# Patient Record
Sex: FEMALE | Race: BLACK OR AFRICAN AMERICAN | NOT HISPANIC OR LATINO | Employment: FULL TIME | URBAN - METROPOLITAN AREA
[De-identification: names, ages, dates, MRNs, and addresses within clinical notes are randomized per-mention and may not be internally consistent; named-entity substitution may affect disease eponyms.]

---

## 2024-02-05 ENCOUNTER — INITIAL PRENATAL (OUTPATIENT)
Dept: OBSTETRICS AND GYNECOLOGY | Facility: CLINIC | Age: 34
End: 2024-02-05

## 2024-02-05 ENCOUNTER — LAB (OUTPATIENT)
Dept: LAB | Facility: LAB | Age: 34
End: 2024-02-05

## 2024-02-05 VITALS
DIASTOLIC BLOOD PRESSURE: 60 MMHG | WEIGHT: 170 LBS | SYSTOLIC BLOOD PRESSURE: 112 MMHG | HEIGHT: 61 IN | BODY MASS INDEX: 32.1 KG/M2

## 2024-02-05 DIAGNOSIS — O09.11 PREGNANCY IN FIRST TRIMESTER WITH HISTORY OF ECTOPIC PREGNANCY (HHS-HCC): ICD-10-CM

## 2024-02-05 DIAGNOSIS — N92.6 MISSED MENSES: ICD-10-CM

## 2024-02-05 LAB
B-HCG SERPL-ACNC: ABNORMAL MIU/ML
PREGNANCY TEST URINE, POC: POSITIVE

## 2024-02-05 PROCEDURE — 84702 CHORIONIC GONADOTROPIN TEST: CPT

## 2024-02-05 PROCEDURE — 36415 COLL VENOUS BLD VENIPUNCTURE: CPT

## 2024-02-05 PROCEDURE — 99213 OFFICE O/P EST LOW 20 MIN: CPT | Performed by: OBSTETRICS & GYNECOLOGY

## 2024-02-05 PROCEDURE — 81025 URINE PREGNANCY TEST: CPT | Performed by: OBSTETRICS & GYNECOLOGY

## 2024-02-05 ASSESSMENT — EDINBURGH POSTNATAL DEPRESSION SCALE (EPDS)
I HAVE FELT SAD OR MISERABLE: NO, NOT AT ALL
I HAVE BEEN ANXIOUS OR WORRIED FOR NO GOOD REASON: HARDLY EVER
I HAVE FELT SCARED OR PANICKY FOR NO GOOD REASON: NO, NOT AT ALL
I HAVE LOOKED FORWARD WITH ENJOYMENT TO THINGS: AS MUCH AS I EVER DID
I HAVE BEEN SO UNHAPPY THAT I HAVE HAD DIFFICULTY SLEEPING: NOT AT ALL
I HAVE BLAMED MYSELF UNNECESSARILY WHEN THINGS WENT WRONG: NO, NEVER
I HAVE BEEN SO UNHAPPY THAT I HAVE BEEN CRYING: NO, NEVER
TOTAL SCORE: 1
THE THOUGHT OF HARMING MYSELF HAS OCCURRED TO ME: NEVER
I HAVE BEEN ABLE TO LAUGH AND SEE THE FUNNY SIDE OF THINGS: AS MUCH AS I ALWAYS COULD
THINGS HAVE BEEN GETTING ON TOP OF ME: NO, I HAVE BEEN COPING AS WELL AS EVER

## 2024-02-05 NOTE — PROGRESS NOTES
"NEW OB VISIT    Assessment/Plan   1. Missed menses  - pregnancy test positive today. History of ectopic pregnancy x 2 on left side and had a left salpingectomy in . Office bedside ultrasound today shows a probable intrauterine gestational sac though images not clear. Recommend HCG today and again in 72 hours. As long as rising appropriately then will plan for ultrasound next week. Discussed precautions for ectopic pregnancy and when to go to ER to seek care.  - Rx sent for prenatal vitamin  - POCT pregnancy, urine manually resulted  - Human Chorionic Gonadotropin, Serum Quantitative; Standing  - prenatal vitamin, iron-folic, 27 mg iron-800 mcg folic acid tablet; Take 1 tablet by mouth once daily.  Dispense: 90 tablet; Refill: 3  - US MAC OB imaging order; Future       Subjective     Amairani Duran is a 33 y.o.  at 6w2d by LMP alone who presents for confirmation of pregnancy in the setting of 2 recent ectopic pregnancies    She reports small amount of spotting, no active bleeding.   She reports mild aching in her left lower abdomen but no moderate or severe discomfort.     OB Hx:   : left laparoscopic salpingectomy for ectopic pregnancy  : left sided ectopic pregnancy which resolved without treatment (per patient report)  2016: SAB at 6 weeks, no treatment  2014: , term, son  : , term, son  : PLTCS at 35 weeks at  (cannot find delivery note). Reports presenting with bleeding, induced, and then had a  section for fetal heart rate concerns     Past Medical Hx: pre-diabetes  Past Surgical Hx: lap salpingectomy, c/s  Social Hx: Denies tobacco, alcohol, drugs  Family Hx: denies  Medications: None  Allergies: NKDA    Pap Hx: 2023: NILM/neg HPV  Flu vaccine: Discuss at [facility]  Covid vaccine: Discuss at [facility]      Objective     Objective   /60   Ht 1.549 m (5' 1\")   Wt 77.1 kg (170 lb)   LMP 2023   BMI 32.12 kg/m²      General:   Alert and oriented, in no acute " distress           Abdomen: Soft, non-tender, without masses or organomegaly   Vulva: Normal architecture without erythema, masses, or lesions.    Vagina: Normal mucosa without lesions, masses, or atrophy. Small amount of blood tinged discharge   Cervix: Normal without masses, lesions, or signs of cervicitis.    Uterus: Normal mobile, appropriate for gestational age   Adnexa: Normal without masses or lesions   Pelvic Floor No POP noted.    Psych Normal affect. Normal mood.

## 2024-02-08 ENCOUNTER — LAB (OUTPATIENT)
Dept: LAB | Facility: LAB | Age: 34
End: 2024-02-08
Payer: COMMERCIAL

## 2024-02-08 DIAGNOSIS — N92.6 MISSED MENSES: ICD-10-CM

## 2024-02-08 LAB — B-HCG SERPL-ACNC: ABNORMAL MIU/ML

## 2024-02-08 PROCEDURE — 36415 COLL VENOUS BLD VENIPUNCTURE: CPT

## 2024-02-08 PROCEDURE — 84702 CHORIONIC GONADOTROPIN TEST: CPT

## 2024-02-12 ENCOUNTER — HOSPITAL ENCOUNTER (OUTPATIENT)
Dept: RADIOLOGY | Facility: HOSPITAL | Age: 34
Discharge: HOME | End: 2024-02-12
Payer: COMMERCIAL

## 2024-02-12 DIAGNOSIS — O09.11 PREGNANCY IN FIRST TRIMESTER WITH HISTORY OF ECTOPIC PREGNANCY (HHS-HCC): ICD-10-CM

## 2024-02-12 PROCEDURE — 76817 TRANSVAGINAL US OBSTETRIC: CPT

## 2024-02-12 PROCEDURE — 76801 OB US < 14 WKS SINGLE FETUS: CPT

## 2024-02-12 PROCEDURE — 76801 OB US < 14 WKS SINGLE FETUS: CPT | Performed by: STUDENT IN AN ORGANIZED HEALTH CARE EDUCATION/TRAINING PROGRAM

## 2024-02-12 PROCEDURE — 76817 TRANSVAGINAL US OBSTETRIC: CPT | Performed by: STUDENT IN AN ORGANIZED HEALTH CARE EDUCATION/TRAINING PROGRAM

## 2024-03-15 ENCOUNTER — ROUTINE PRENATAL (OUTPATIENT)
Dept: OBSTETRICS AND GYNECOLOGY | Facility: CLINIC | Age: 34
End: 2024-03-15

## 2024-03-15 ENCOUNTER — LAB (OUTPATIENT)
Dept: LAB | Facility: LAB | Age: 34
End: 2024-03-15

## 2024-03-15 VITALS
SYSTOLIC BLOOD PRESSURE: 135 MMHG | DIASTOLIC BLOOD PRESSURE: 82 MMHG | BODY MASS INDEX: 33.63 KG/M2 | WEIGHT: 178 LBS | HEART RATE: 80 BPM

## 2024-03-15 DIAGNOSIS — N92.6 MISSED MENSES: ICD-10-CM

## 2024-03-15 DIAGNOSIS — Z3A.11 11 WEEKS GESTATION OF PREGNANCY (HHS-HCC): ICD-10-CM

## 2024-03-15 DIAGNOSIS — Z3A.11 11 WEEKS GESTATION OF PREGNANCY (HHS-HCC): Primary | ICD-10-CM

## 2024-03-15 PROBLEM — O09.11 PREGNANCY IN FIRST TRIMESTER WITH HISTORY OF ECTOPIC PREGNANCY (HHS-HCC): Status: RESOLVED | Noted: 2023-02-03 | Resolved: 2024-03-15

## 2024-03-15 LAB
ABO GROUP (TYPE) IN BLOOD: NORMAL
ANTIBODY SCREEN: NORMAL
B-HCG SERPL-ACNC: ABNORMAL MIU/ML
ERYTHROCYTE [DISTWIDTH] IN BLOOD BY AUTOMATED COUNT: 15.4 % (ref 11.5–14.5)
EST. AVERAGE GLUCOSE BLD GHB EST-MCNC: 105 MG/DL
HBA1C MFR BLD: 5.3 %
HBV SURFACE AG SERPL QL IA: NONREACTIVE
HCT VFR BLD AUTO: 38.9 % (ref 36–46)
HCV AB SER QL: NONREACTIVE
HGB BLD-MCNC: 12.2 G/DL (ref 12–16)
HIV 1+2 AB+HIV1 P24 AG SERPL QL IA: NONREACTIVE
MCH RBC QN AUTO: 25.1 PG (ref 26–34)
MCHC RBC AUTO-ENTMCNC: 31.4 G/DL (ref 32–36)
MCV RBC AUTO: 80 FL (ref 80–100)
NRBC BLD-RTO: 0 /100 WBCS (ref 0–0)
PLATELET # BLD AUTO: 287 X10*3/UL (ref 150–450)
RBC # BLD AUTO: 4.86 X10*6/UL (ref 4–5.2)
REFLEX ADDED, ANEMIA PANEL: NORMAL
RH FACTOR (ANTIGEN D): NORMAL
WBC # BLD AUTO: 7.4 X10*3/UL (ref 4.4–11.3)

## 2024-03-15 PROCEDURE — 83020 HEMOGLOBIN ELECTROPHORESIS: CPT | Performed by: OBSTETRICS & GYNECOLOGY

## 2024-03-15 PROCEDURE — 86901 BLOOD TYPING SEROLOGIC RH(D): CPT

## 2024-03-15 PROCEDURE — 83036 HEMOGLOBIN GLYCOSYLATED A1C: CPT

## 2024-03-15 PROCEDURE — 86780 TREPONEMA PALLIDUM: CPT

## 2024-03-15 PROCEDURE — 85027 COMPLETE CBC AUTOMATED: CPT

## 2024-03-15 PROCEDURE — 86803 HEPATITIS C AB TEST: CPT

## 2024-03-15 PROCEDURE — 84702 CHORIONIC GONADOTROPIN TEST: CPT

## 2024-03-15 PROCEDURE — 99213 OFFICE O/P EST LOW 20 MIN: CPT | Mod: GC,TH | Performed by: STUDENT IN AN ORGANIZED HEALTH CARE EDUCATION/TRAINING PROGRAM

## 2024-03-15 PROCEDURE — 86317 IMMUNOASSAY INFECTIOUS AGENT: CPT

## 2024-03-15 PROCEDURE — 0501F PRENATAL FLOW SHEET: CPT | Performed by: STUDENT IN AN ORGANIZED HEALTH CARE EDUCATION/TRAINING PROGRAM

## 2024-03-15 PROCEDURE — 87086 URINE CULTURE/COLONY COUNT: CPT | Performed by: STUDENT IN AN ORGANIZED HEALTH CARE EDUCATION/TRAINING PROGRAM

## 2024-03-15 PROCEDURE — 87340 HEPATITIS B SURFACE AG IA: CPT

## 2024-03-15 PROCEDURE — 36415 COLL VENOUS BLD VENIPUNCTURE: CPT

## 2024-03-15 PROCEDURE — 86850 RBC ANTIBODY SCREEN: CPT

## 2024-03-15 PROCEDURE — 87800 DETECT AGNT MULT DNA DIREC: CPT | Performed by: STUDENT IN AN ORGANIZED HEALTH CARE EDUCATION/TRAINING PROGRAM

## 2024-03-15 PROCEDURE — 83021 HEMOGLOBIN CHROMOTOGRAPHY: CPT

## 2024-03-15 PROCEDURE — 86900 BLOOD TYPING SEROLOGIC ABO: CPT

## 2024-03-15 PROCEDURE — 87389 HIV-1 AG W/HIV-1&-2 AB AG IA: CPT

## 2024-03-15 RX ORDER — NAPROXEN SODIUM 220 MG/1
81 TABLET, FILM COATED ORAL DAILY
Qty: 30 TABLET | Refills: 11 | Status: SHIPPED | OUTPATIENT
Start: 2024-03-15 | End: 2025-03-15

## 2024-03-15 ASSESSMENT — PAIN SCALES - GENERAL: PAINLEVEL_OUTOF10: 8

## 2024-03-15 ASSESSMENT — PAIN - FUNCTIONAL ASSESSMENT: PAIN_FUNCTIONAL_ASSESSMENT: 0-10

## 2024-03-15 NOTE — PROGRESS NOTES
OB Follow-up  3/15/2024         SUBJECTIVE    HPI: Amairani Duran is a 33 y.o.  at 11w6d here for RPNV. Denies bleeding, or LOF. Patient reports intermittent L sided pain. Works at a shoe store. Feeling tired.     OB Hx:   : left laparoscopic salpingectomy for ectopic pregnancy  : left sided ectopic pregnancy which resolved without treatment (per patient report)  : SAB at 6 weeks, no treatment  : , term, son  : , term, son  : PLTCS at 35 weeks at  (cannot find delivery note). Reports presenting with bleeding, induced, and then had a  section for fetal heart rate concerns      Past Medical Hx: pre-diabetes  Past Surgical Hx: lap salpingectomy, c/s  Social Hx: Denies tobacco, alcohol, drugs  Family Hx: denies  Medications: None  Allergies: NKDA     Pap Hx: 2023: NILM/neg HPV    OBJECTIVE    Visit Vitals  /82   Pulse 80   Wt 80.7 kg (178 lb)   LMP 2023   BMI 33.63 kg/m²   OB Status Pregnant   Smoking Status Never   BSA 1.86 m²      ASSESSMENT & PLAN    Amairani Duran is a 33 y.o.  at 11w6d here for the following concerns we addressed today:    11 weeks gestation of pregnancy  - IUP confirmed at 7 wks  - PNL today including A1c  - bASA to begin at 12wga  - aneuploidy screening: NT next week and cf DNA  Pre test genetic counseling discussed and included:   -- Interpretation of family and medical histories to assess the probability of disease occurrence or recurrence; and  -- Education about inheritance, genetic testing, disease management, prevention and resources; and   -- Counseling to promote informed choices and adaptation to the risk or presented of a genetic condition; and  -- Counseling for psychological aspects of genetic testing.       Orders Placed This Encounter   Procedures    Urine culture    Prenatal Screening Panel    Type And Screen    Hemoglobin Identification with Path Review    C. trachomatis + N. gonorrhoeae, Amplified     Hemoglobin A1C    [facility] Prequel Prenatal Screen     RTC in 4 weeks    Patient seen and evaluated with Dr. Partha Gorman MD

## 2024-03-16 LAB
BACTERIA UR CULT: NORMAL
C TRACH RRNA SPEC QL NAA+PROBE: NEGATIVE
N GONORRHOEA DNA SPEC QL PROBE+SIG AMP: NEGATIVE
RUBV IGG SERPL IA-ACNC: 0.8 IA
RUBV IGG SERPL QL IA: NORMAL
TREPONEMA PALLIDUM IGG+IGM AB [PRESENCE] IN SERUM OR PLASMA BY IMMUNOASSAY: NONREACTIVE

## 2024-03-19 LAB
HEMOGLOBIN A2: 2.8 % (ref 2–3.5)
HEMOGLOBIN A: 96.6 % (ref 95.8–98)
HEMOGLOBIN F: 0.6 % (ref 0–2)
HEMOGLOBIN IDENTIFICATION INTERPRETATION: NORMAL
PATH REVIEW-HGB IDENTIFICATION: NORMAL

## 2024-03-25 ENCOUNTER — HOSPITAL ENCOUNTER (OUTPATIENT)
Dept: RADIOLOGY | Facility: HOSPITAL | Age: 34
Discharge: HOME | End: 2024-03-25
Payer: COMMERCIAL

## 2024-03-25 DIAGNOSIS — O09.11 PREGNANCY IN FIRST TRIMESTER WITH HISTORY OF ECTOPIC PREGNANCY (HHS-HCC): ICD-10-CM

## 2024-03-25 DIAGNOSIS — E66.9 OBESITY (BMI 30.0-34.9): ICD-10-CM

## 2024-03-25 DIAGNOSIS — O09.529 AMA (ADVANCED MATERNAL AGE) MULTIGRAVIDA 35+ (HHS-HCC): ICD-10-CM

## 2024-03-25 LAB — SCAN RESULT: NORMAL

## 2024-03-25 PROCEDURE — 76813 OB US NUCHAL MEAS 1 GEST: CPT

## 2024-03-25 PROCEDURE — 76813 OB US NUCHAL MEAS 1 GEST: CPT | Performed by: OBSTETRICS & GYNECOLOGY

## 2024-04-12 ENCOUNTER — ROUTINE PRENATAL (OUTPATIENT)
Dept: OBSTETRICS AND GYNECOLOGY | Facility: CLINIC | Age: 34
End: 2024-04-12

## 2024-04-12 VITALS
BODY MASS INDEX: 34.27 KG/M2 | WEIGHT: 181.38 LBS | SYSTOLIC BLOOD PRESSURE: 120 MMHG | DIASTOLIC BLOOD PRESSURE: 70 MMHG

## 2024-04-12 DIAGNOSIS — Z98.891 HISTORY OF CESAREAN SECTION: ICD-10-CM

## 2024-04-12 DIAGNOSIS — O09.522 MULTIGRAVIDA OF ADVANCED MATERNAL AGE IN SECOND TRIMESTER (HHS-HCC): ICD-10-CM

## 2024-04-12 DIAGNOSIS — O09.90 SUPERVISION OF HIGH RISK PREGNANCY, ANTEPARTUM (HHS-HCC): Primary | ICD-10-CM

## 2024-04-12 PROCEDURE — 0501F PRENATAL FLOW SHEET: CPT | Performed by: OBSTETRICS & GYNECOLOGY

## 2024-04-14 PROBLEM — O09.522 MULTIGRAVIDA OF ADVANCED MATERNAL AGE IN SECOND TRIMESTER (HHS-HCC): Status: ACTIVE | Noted: 2024-04-14

## 2024-04-14 PROBLEM — Z98.891 HISTORY OF CESAREAN SECTION: Status: ACTIVE | Noted: 2024-04-14

## 2024-04-14 PROBLEM — O09.90 SUPERVISION OF HIGH RISK PREGNANCY, ANTEPARTUM (HHS-HCC): Status: ACTIVE | Noted: 2024-04-14

## 2024-04-14 NOTE — PROGRESS NOTES
OB Follow up visit    ASSESSMENT & PLAN    Jacqueline Ambriz is a 33 y.o.  at 16w1d presenting for routine prenatal care    Problem List Items Addressed This Visit       History of  section    Overview     2 successful VBACs         Multigravida of advanced maternal age in second trimester ([facility])    Overview     Rr cf DNA         Supervision of high risk pregnancy, antepartum ([facility]) - Primary    Overview     [x] Dating: LMP consistent with 7 week ultrasound  [x] Initial BMI: 32  [x] Prenatal Labs: B+ blood type, rubella equivocal, Hgb 12.2  [x] Pap: 2023: NILM/neg HPV  [x] Aneuploidy Screening: rr cfDNA, XX   [] Baby ASA:  [] Anatomy US:  [] 1hr GCT at 24-28wks:  [] Tdap (27-36wks):  [] Flu Shot:  [] COVID vaccine:   [] Rhogam (if Rh neg):  [] Third trimester growth:   [] GBS at 36 wks:  [] Breastfeeding  [] PPBC:   [] 39 weeks discussion of IOL vs. Expectant management:  [] Mode of delivery:              RTC in 4 weeks      SUBJECTIVE    HPI: Jacqueline Ambriz is a 33 y.o.  at 16w1d here for RPNV. Patient without complaints today. Feels well.       OBJECTIVE    Visit Vitals  /70   Wt 82.3 kg (181 lb 6 oz)   LMP 2023   BMI 34.27 kg/m²   OB Status Pregnant   Smoking Status Never   BSA 1.88 m²        Marjan Bearden MD

## 2024-05-13 ENCOUNTER — HOSPITAL ENCOUNTER (OUTPATIENT)
Dept: RADIOLOGY | Facility: HOSPITAL | Age: 34
Discharge: HOME | End: 2024-05-13

## 2024-05-13 DIAGNOSIS — O09.11 PREGNANCY IN FIRST TRIMESTER WITH HISTORY OF ECTOPIC PREGNANCY (HHS-HCC): ICD-10-CM

## 2024-05-13 PROCEDURE — 76811 OB US DETAILED SNGL FETUS: CPT

## 2024-05-13 PROCEDURE — 76811 OB US DETAILED SNGL FETUS: CPT | Performed by: OBSTETRICS & GYNECOLOGY

## 2024-05-13 PROCEDURE — 76817 TRANSVAGINAL US OBSTETRIC: CPT | Performed by: OBSTETRICS & GYNECOLOGY

## 2024-05-13 PROCEDURE — 76817 TRANSVAGINAL US OBSTETRIC: CPT

## 2024-06-27 ENCOUNTER — APPOINTMENT (OUTPATIENT)
Dept: OBSTETRICS AND GYNECOLOGY | Facility: CLINIC | Age: 34
End: 2024-06-27
Payer: COMMERCIAL

## 2024-06-27 VITALS — BODY MASS INDEX: 33.82 KG/M2 | SYSTOLIC BLOOD PRESSURE: 102 MMHG | DIASTOLIC BLOOD PRESSURE: 60 MMHG | WEIGHT: 179 LBS

## 2024-06-27 DIAGNOSIS — Z98.891 HISTORY OF CESAREAN SECTION: ICD-10-CM

## 2024-06-27 DIAGNOSIS — O09.522 MULTIGRAVIDA OF ADVANCED MATERNAL AGE IN SECOND TRIMESTER (HHS-HCC): ICD-10-CM

## 2024-06-27 DIAGNOSIS — O09.90 SUPERVISION OF HIGH RISK PREGNANCY, ANTEPARTUM (HHS-HCC): Primary | ICD-10-CM

## 2024-06-27 PROCEDURE — 99213 OFFICE O/P EST LOW 20 MIN: CPT | Performed by: OBSTETRICS & GYNECOLOGY

## 2024-06-27 NOTE — PROGRESS NOTES
OB Follow up visit    ASSESSMENT & PLAN    Jacqueline Ambriz is a 33 y.o.  at 26w5d presenting for routine prenatal care    Problem List Items Addressed This Visit       History of  section    Overview     2 successful VBACs  Desires another TOLAC         Multigravida of advanced maternal age in second trimester ([facility])    Overview     Rr cf DNA         Supervision of high risk pregnancy, antepartum ([facility]) - Primary    Overview     [x] Dating: LMP consistent with 7 week ultrasound  [x] Initial BMI: 32  [x] Prenatal Labs: B+ blood type, rubella equivocal, Hgb 12.2  [x] Pap: 2023: NILM/neg HPV  [x] Aneuploidy Screening: rr cfDNA, XX   [x] Baby ASA: Not indicated  [x] Anatomy US: normal  [] 1hr GCT at 24-28wks: ordered, will go this week  [] Tdap (27-36wks): discussed , desires to get at [facility]  [x] Flu Shot: NA  [] COVID vaccine:   [x] Rhogam (if Rh neg): Not indicated  [] Third trimester growth:   [] GBS at 36 wks:  [x] Breastfeeding: Desires to bf  [x] PPBC: Discussed options, desires nexplanon immediately postpartum   [] 39 weeks discussion of IOL vs. Expectant management:  [x] Mode of delivery: Desires another TOLAC           Relevant Orders    Glucose, 1 Hour Screen, Pregnancy    CBC Anemia Panel With Reflex,Pregnancy    Syphilis Screen with Reflex        Orders Placed This Encounter   Procedures    Glucose, 1 Hour Screen, Pregnancy     Standing Status:   Future     Standing Expiration Date:   2025     Order Specific Question:   Release result to MyChart     Answer:   Immediate [1]    CBC Anemia Panel With Reflex,Pregnancy     Standing Status:   Future     Standing Expiration Date:   2025     Order Specific Question:   Release result to MyChart     Answer:   Immediate [1]    Syphilis Screen with Reflex     Standing Status:   Future     Standing Expiration Date:   2025     Order Specific Question:   Release result to MyChart     Answer:   Immediate [1]        RTC in 4  weeks      SUBJECTIVE    HPI: Jacqueline Ambriz is a 33 y.o.  at 26w5d here for RPNV. Denies contractions, bleeding, or LOF. Reports normal fetal movement.       OBJECTIVE    Visit Vitals  /60   Wt 81.2 kg (179 lb)   LMP 2023   BMI 33.82 kg/m²   OB Status Pregnant   Smoking Status Never   BSA 1.87 m²        Marjan Bearden MD

## 2024-07-08 ENCOUNTER — LAB (OUTPATIENT)
Dept: LAB | Facility: LAB | Age: 34
End: 2024-07-08
Payer: COMMERCIAL

## 2024-07-08 DIAGNOSIS — O09.90 SUPERVISION OF HIGH RISK PREGNANCY, ANTEPARTUM (HHS-HCC): ICD-10-CM

## 2024-07-08 LAB
ERYTHROCYTE [DISTWIDTH] IN BLOOD BY AUTOMATED COUNT: 15.8 % (ref 11.5–14.5)
GLUCOSE 1H P 50 G GLC PO SERPL-MCNC: 118 MG/DL
HCT VFR BLD AUTO: 35.4 % (ref 36–46)
HGB BLD-MCNC: 11.2 G/DL (ref 12–16)
MCH RBC QN AUTO: 26.5 PG (ref 26–34)
MCHC RBC AUTO-ENTMCNC: 31.6 G/DL (ref 32–36)
MCV RBC AUTO: 84 FL (ref 80–100)
NRBC BLD-RTO: 0 /100 WBCS (ref 0–0)
PLATELET # BLD AUTO: 211 X10*3/UL (ref 150–450)
RBC # BLD AUTO: 4.23 X10*6/UL (ref 4–5.2)
REFLEX ADDED, ANEMIA PANEL: NORMAL
TREPONEMA PALLIDUM IGG+IGM AB [PRESENCE] IN SERUM OR PLASMA BY IMMUNOASSAY: NONREACTIVE
WBC # BLD AUTO: 5.7 X10*3/UL (ref 4.4–11.3)

## 2024-07-08 PROCEDURE — 86780 TREPONEMA PALLIDUM: CPT | Performed by: OBSTETRICS & GYNECOLOGY

## 2024-07-08 PROCEDURE — 82947 ASSAY GLUCOSE BLOOD QUANT: CPT | Performed by: OBSTETRICS & GYNECOLOGY

## 2024-07-08 PROCEDURE — 85027 COMPLETE CBC AUTOMATED: CPT | Performed by: OBSTETRICS & GYNECOLOGY

## 2024-07-15 ENCOUNTER — OFFICE VISIT (OUTPATIENT)
Dept: OBSTETRICS AND GYNECOLOGY | Facility: CLINIC | Age: 34
End: 2024-07-15
Payer: COMMERCIAL

## 2024-07-15 VITALS — DIASTOLIC BLOOD PRESSURE: 73 MMHG | SYSTOLIC BLOOD PRESSURE: 115 MMHG | WEIGHT: 186 LBS | BODY MASS INDEX: 35.14 KG/M2

## 2024-07-15 DIAGNOSIS — Z3A.29 29 WEEKS GESTATION OF PREGNANCY (HHS-HCC): Primary | ICD-10-CM

## 2024-07-15 PROCEDURE — 90471 IMMUNIZATION ADMIN: CPT | Performed by: NURSE PRACTITIONER

## 2024-07-15 PROCEDURE — 99213 OFFICE O/P EST LOW 20 MIN: CPT | Performed by: NURSE PRACTITIONER

## 2024-07-15 PROCEDURE — 90715 TDAP VACCINE 7 YRS/> IM: CPT | Performed by: NURSE PRACTITIONER

## 2024-07-15 ASSESSMENT — EDINBURGH POSTNATAL DEPRESSION SCALE (EPDS)
I HAVE BEEN SO UNHAPPY THAT I HAVE HAD DIFFICULTY SLEEPING: NOT AT ALL
I HAVE LOOKED FORWARD WITH ENJOYMENT TO THINGS: RATHER LESS THAN I USED TO
I HAVE FELT SAD OR MISERABLE: NOT VERY OFTEN
I HAVE BEEN SO UNHAPPY THAT I HAVE BEEN CRYING: NO, NEVER
THINGS HAVE BEEN GETTING ON TOP OF ME: NO, I HAVE BEEN COPING AS WELL AS EVER
I HAVE FELT SCARED OR PANICKY FOR NO GOOD REASON: NO, NOT AT ALL
I HAVE BEEN ABLE TO LAUGH AND SEE THE FUNNY SIDE OF THINGS: AS MUCH AS I ALWAYS COULD
THE THOUGHT OF HARMING MYSELF HAS OCCURRED TO ME: NEVER
I HAVE BLAMED MYSELF UNNECESSARILY WHEN THINGS WENT WRONG: NO, NEVER
I HAVE BEEN ANXIOUS OR WORRIED FOR NO GOOD REASON: NO, NOT AT ALL
TOTAL SCORE: 2

## 2024-07-15 ASSESSMENT — ENCOUNTER SYMPTOMS
LOSS OF SENSATION IN FEET: 0
DEPRESSION: 0
HEMATOLOGIC/LYMPHATIC NEGATIVE: 0
CONSTITUTIONAL NEGATIVE: 0
GASTROINTESTINAL NEGATIVE: 0
EYES NEGATIVE: 0
CARDIOVASCULAR NEGATIVE: 0
NEUROLOGICAL NEGATIVE: 0
PSYCHIATRIC NEGATIVE: 0
ENDOCRINE NEGATIVE: 0
MUSCULOSKELETAL NEGATIVE: 0
OCCASIONAL FEELINGS OF UNSTEADINESS: 0
ALLERGIC/IMMUNOLOGIC NEGATIVE: 0
RESPIRATORY NEGATIVE: 0

## 2024-07-15 ASSESSMENT — PATIENT HEALTH QUESTIONNAIRE - PHQ9
2. FEELING DOWN, DEPRESSED OR HOPELESS: NOT AT ALL
SUM OF ALL RESPONSES TO PHQ9 QUESTIONS 1 & 2: 0
1. LITTLE INTEREST OR PLEASURE IN DOING THINGS: NOT AT ALL

## 2024-07-15 NOTE — PROGRESS NOTES
"Subjective   Jacqueline Ambriz is a 33 y.o.  at 29w2d with a working estimated date of delivery of 2024, by Last Menstrual Period who presents for a routine prenatal visit.     Patient is here today because she experienced some spotting after sexual intercourse.  Spotting has since stopped.  Patient is at this office as it was the first available.  She also did not know this office was in existence and this is closer to her home.    Patient did complete the 1 hour GTT and it was within normal limits.  Objective   Physical Exam:   Weight: 84.4 kg (186 lb)  TW.258 kg (16 lb)  BP: 115/73  Fetal Heart Rate: 143 Fundal Height (cm): 32 cm           Postpartum Depression: Low Risk  (7/15/2024)    [place]  Depression Scale     Last EPDS Total Score: 2     Last EPDS Self Harm Result: Never        Prenatal Labs  Lab Results   Component Value Date    HGB 11.2 (L) 2024    HCT 35.4 (L) 2024     2024    SYPHT Nonreactive 2024     Lab Results   Component Value Date    GLUC1P 118 2024     No results found for: \"GRPBSTREP\"     Imaging  The most recent ultrasound was performed on May 13 with a study GA of 20 weeks 2 days and EFW 45th %,   Assessment/Plan   Problem List Items Addressed This Visit    None  Visit Diagnoses       29 weeks gestation of pregnancy ([facility])    -  Primary            Reviewed s/sx of PTL, warning signs, fetal movement counts, and when to call provider  Follow up in 2 week for a routine prenatal visit.  Tdap given  SCOOTER Arenas-CNP  "

## 2024-07-17 ENCOUNTER — TELEPHONE (OUTPATIENT)
Dept: OBSTETRICS AND GYNECOLOGY | Facility: HOSPITAL | Age: 34
End: 2024-07-17
Payer: COMMERCIAL

## 2024-07-17 NOTE — TELEPHONE ENCOUNTER
Contacted patient to discuss normal test results. Patient identified by name and . Reviewed that all labs were reassuring. The patient verbalized understanding, she denies any pregnancy complications at this time.  Tavia Schumacher MSN, RN, CLC

## 2024-07-20 ENCOUNTER — HOSPITAL ENCOUNTER (OUTPATIENT)
Facility: HOSPITAL | Age: 34
Discharge: HOME | End: 2024-07-20
Attending: STUDENT IN AN ORGANIZED HEALTH CARE EDUCATION/TRAINING PROGRAM | Admitting: STUDENT IN AN ORGANIZED HEALTH CARE EDUCATION/TRAINING PROGRAM
Payer: COMMERCIAL

## 2024-07-20 ENCOUNTER — HOSPITAL ENCOUNTER (OUTPATIENT)
Facility: HOSPITAL | Age: 34
End: 2024-07-20
Attending: STUDENT IN AN ORGANIZED HEALTH CARE EDUCATION/TRAINING PROGRAM | Admitting: STUDENT IN AN ORGANIZED HEALTH CARE EDUCATION/TRAINING PROGRAM
Payer: COMMERCIAL

## 2024-07-20 VITALS
DIASTOLIC BLOOD PRESSURE: 69 MMHG | OXYGEN SATURATION: 97 % | TEMPERATURE: 98.2 F | WEIGHT: 189.6 LBS | HEART RATE: 60 BPM | BODY MASS INDEX: 35.8 KG/M2 | HEIGHT: 61 IN | SYSTOLIC BLOOD PRESSURE: 138 MMHG

## 2024-07-20 DIAGNOSIS — O14.13 SEVERE PRE-ECLAMPSIA IN THIRD TRIMESTER (HHS-HCC): ICD-10-CM

## 2024-07-20 DIAGNOSIS — Z98.891 HISTORY OF CESAREAN SECTION: ICD-10-CM

## 2024-07-20 DIAGNOSIS — S30.1XXA HEMATOMA OF RECTUS SHEATH, INITIAL ENCOUNTER: ICD-10-CM

## 2024-07-20 DIAGNOSIS — O99.013 ANEMIA IN PREGNANCY, THIRD TRIMESTER (HHS-HCC): Primary | ICD-10-CM

## 2024-07-20 LAB
APTT PPP: 28 SECONDS (ref 27–38)
ERYTHROCYTE [DISTWIDTH] IN BLOOD BY AUTOMATED COUNT: 15.5 % (ref 11.5–14.5)
FIBRINOGEN PPP-MCNC: 502 MG/DL (ref 200–400)
HCT VFR BLD AUTO: 30.8 % (ref 36–46)
HGB BLD-MCNC: 10 G/DL (ref 12–16)
INR PPP: 1 (ref 0.9–1.1)
MCH RBC QN AUTO: 26 PG (ref 26–34)
MCHC RBC AUTO-ENTMCNC: 32.5 G/DL (ref 32–36)
MCV RBC AUTO: 80 FL (ref 80–100)
NRBC BLD-RTO: 0 /100 WBCS (ref 0–0)
PLATELET # BLD AUTO: 193 X10*3/UL (ref 150–450)
POC APPEARANCE, URINE: CLEAR
POC BILIRUBIN, URINE: NEGATIVE
POC BLOOD, URINE: ABNORMAL
POC COLOR, URINE: YELLOW
POC GLUCOSE, URINE: NEGATIVE MG/DL
POC KETONES, URINE: ABNORMAL MG/DL
POC LEUKOCYTES, URINE: ABNORMAL
POC NITRITE,URINE: NEGATIVE
POC PH, URINE: 6 PH
POC PROTEIN, URINE: ABNORMAL MG/DL
POC SPECIFIC GRAVITY, URINE: >=1.03
POC UROBILINOGEN, URINE: 1 EU/DL
PROTHROMBIN TIME: 10.9 SECONDS (ref 9.8–12.8)
RBC # BLD AUTO: 3.85 X10*6/UL (ref 4–5.2)
WBC # BLD AUTO: 6 X10*3/UL (ref 4.4–11.3)

## 2024-07-20 PROCEDURE — 81002 URINALYSIS NONAUTO W/O SCOPE: CPT

## 2024-07-20 PROCEDURE — 85610 PROTHROMBIN TIME: CPT

## 2024-07-20 PROCEDURE — 85384 FIBRINOGEN ACTIVITY: CPT

## 2024-07-20 PROCEDURE — 85027 COMPLETE CBC AUTOMATED: CPT

## 2024-07-20 PROCEDURE — 99215 OFFICE O/P EST HI 40 MIN: CPT | Mod: 25

## 2024-07-20 PROCEDURE — 36415 COLL VENOUS BLD VENIPUNCTURE: CPT

## 2024-07-20 PROCEDURE — 87205 SMEAR GRAM STAIN: CPT

## 2024-07-20 PROCEDURE — 59025 FETAL NON-STRESS TEST: CPT

## 2024-07-20 PROCEDURE — 99214 OFFICE O/P EST MOD 30 MIN: CPT

## 2024-07-20 PROCEDURE — 96372 THER/PROPH/DIAG INJ SC/IM: CPT

## 2024-07-20 PROCEDURE — 59025 FETAL NON-STRESS TEST: CPT | Mod: GC

## 2024-07-20 RX ORDER — ACETAMINOPHEN 500 MG
1 TABLET ORAL EVERY 6 HOURS PRN
COMMUNITY

## 2024-07-20 RX ORDER — FERROUS GLUCONATE 324(38)MG
38 TABLET ORAL
Qty: 30 TABLET | Refills: 0 | Status: SHIPPED | OUTPATIENT
Start: 2024-07-20 | End: 2024-08-19

## 2024-07-20 SDOH — SOCIAL STABILITY: SOCIAL INSECURITY: HAVE YOU HAD THOUGHTS OF HARMING ANYONE ELSE?: NO

## 2024-07-20 SDOH — ECONOMIC STABILITY: TRANSPORTATION INSECURITY
IN THE PAST 12 MONTHS, HAS THE LACK OF TRANSPORTATION KEPT YOU FROM MEDICAL APPOINTMENTS OR FROM GETTING MEDICATIONS?: NO

## 2024-07-20 SDOH — HEALTH STABILITY: MENTAL HEALTH: SUICIDAL BEHAVIOR (LIFETIME): NO

## 2024-07-20 SDOH — ECONOMIC STABILITY: FOOD INSECURITY: WITHIN THE PAST 12 MONTHS, THE FOOD YOU BOUGHT JUST DIDN'T LAST AND YOU DIDN'T HAVE MONEY TO GET MORE.: NEVER TRUE

## 2024-07-20 SDOH — SOCIAL STABILITY: SOCIAL INSECURITY: DO YOU FEEL ANYONE HAS EXPLOITED OR TAKEN ADVANTAGE OF YOU FINANCIALLY OR OF YOUR PERSONAL PROPERTY?: NO

## 2024-07-20 SDOH — HEALTH STABILITY: MENTAL HEALTH: HAVE YOU USED ANY SUBSTANCES (CANABIS, COCAINE, HEROIN, HALLUCINOGENS, INHALANTS, ETC.) IN THE PAST 12 MONTHS?: NO

## 2024-07-20 SDOH — SOCIAL STABILITY: SOCIAL INSECURITY: VERBAL ABUSE: DENIES

## 2024-07-20 SDOH — HEALTH STABILITY: MENTAL HEALTH: WISH TO BE DEAD (PAST 1 MONTH): NO

## 2024-07-20 SDOH — SOCIAL STABILITY: SOCIAL INSECURITY: HAS ANYONE EVER THREATENED TO HURT YOUR FAMILY OR YOUR PETS?: NO

## 2024-07-20 SDOH — SOCIAL STABILITY: SOCIAL INSECURITY: ARE THERE ANY APPARENT SIGNS OF INJURIES/BEHAVIORS THAT COULD BE RELATED TO ABUSE/NEGLECT?: NO

## 2024-07-20 SDOH — SOCIAL STABILITY: SOCIAL INSECURITY: DOES ANYONE TRY TO KEEP YOU FROM HAVING/CONTACTING OTHER FRIENDS OR DOING THINGS OUTSIDE YOUR HOME?: NO

## 2024-07-20 SDOH — ECONOMIC STABILITY: FOOD INSECURITY: WITHIN THE PAST 12 MONTHS, YOU WORRIED THAT YOUR FOOD WOULD RUN OUT BEFORE YOU GOT MONEY TO BUY MORE.: NEVER TRUE

## 2024-07-20 SDOH — SOCIAL STABILITY: SOCIAL INSECURITY: ABUSE SCREEN: ADULT

## 2024-07-20 SDOH — HEALTH STABILITY: MENTAL HEALTH: WERE YOU ABLE TO COMPLETE ALL THE BEHAVIORAL HEALTH SCREENINGS?: YES

## 2024-07-20 SDOH — ECONOMIC STABILITY: TRANSPORTATION INSECURITY
IN THE PAST 12 MONTHS, HAS LACK OF TRANSPORTATION KEPT YOU FROM MEETINGS, WORK, OR FROM GETTING THINGS NEEDED FOR DAILY LIVING?: NO

## 2024-07-20 SDOH — ECONOMIC STABILITY: HOUSING INSECURITY: DO YOU FEEL UNSAFE GOING BACK TO THE PLACE WHERE YOU ARE LIVING?: NO

## 2024-07-20 SDOH — SOCIAL STABILITY: SOCIAL INSECURITY: HAVE YOU HAD ANY THOUGHTS OF HARMING ANYONE ELSE?: NO

## 2024-07-20 SDOH — SOCIAL STABILITY: SOCIAL INSECURITY: PHYSICAL ABUSE: DENIES

## 2024-07-20 SDOH — HEALTH STABILITY: MENTAL HEALTH: NON-SPECIFIC ACTIVE SUICIDAL THOUGHTS (PAST 1 MONTH): NO

## 2024-07-20 SDOH — SOCIAL STABILITY: SOCIAL INSECURITY: ARE YOU OR HAVE YOU BEEN THREATENED OR ABUSED PHYSICALLY, EMOTIONALLY, OR SEXUALLY BY ANYONE?: NO

## 2024-07-20 SDOH — HEALTH STABILITY: MENTAL HEALTH: HAVE YOU USED ANY PRESCRIPTION DRUGS OTHER THAN PRESCRIBED IN THE PAST 12 MONTHS?: NO

## 2024-07-20 ASSESSMENT — SOCIAL DETERMINANTS OF HEALTH (SDOH)
WITHIN THE LAST YEAR, HAVE YOU BEEN AFRAID OF YOUR PARTNER OR EX-PARTNER?: NO
HOW HARD IS IT FOR YOU TO PAY FOR THE VERY BASICS LIKE FOOD, HOUSING, MEDICAL CARE, AND HEATING?: NOT VERY HARD
WITHIN THE LAST YEAR, HAVE YOU BEEN HUMILIATED OR EMOTIONALLY ABUSED IN OTHER WAYS BY YOUR PARTNER OR EX-PARTNER?: NO
WITHIN THE LAST YEAR, HAVE YOU BEEN KICKED, HIT, SLAPPED, OR OTHERWISE PHYSICALLY HURT BY YOUR PARTNER OR EX-PARTNER?: NO
WITHIN THE LAST YEAR, HAVE TO BEEN RAPED OR FORCED TO HAVE ANY KIND OF SEXUAL ACTIVITY BY YOUR PARTNER OR EX-PARTNER?: NO

## 2024-07-20 ASSESSMENT — PATIENT HEALTH QUESTIONNAIRE - PHQ9
2. FEELING DOWN, DEPRESSED OR HOPELESS: NOT AT ALL
1. LITTLE INTEREST OR PLEASURE IN DOING THINGS: NOT AT ALL
SUM OF ALL RESPONSES TO PHQ9 QUESTIONS 1 & 2: 0

## 2024-07-20 ASSESSMENT — LIFESTYLE VARIABLES
HOW OFTEN DO YOU HAVE A DRINK CONTAINING ALCOHOL: NEVER
HOW MANY STANDARD DRINKS CONTAINING ALCOHOL DO YOU HAVE ON A TYPICAL DAY: PATIENT DOES NOT DRINK
AUDIT-C TOTAL SCORE: 0
HOW OFTEN DO YOU HAVE 6 OR MORE DRINKS ON ONE OCCASION: NEVER
AUDIT-C TOTAL SCORE: 0
SKIP TO QUESTIONS 9-10: 1

## 2024-07-20 ASSESSMENT — PAIN SCALES - GENERAL: PAINLEVEL_OUTOF10: 4

## 2024-07-20 NOTE — H&P
Obstetrical TRIAGE History and Physical    Chief Complaint: Vaginal Bleeding (Reports red bleeding with dime-sized clots when she wiped after voiding.  Reports recurrent bleeding episodes.  No sexual intercourse since 7/15.)    Assessment/Plan      34 y.o.  at 30w0d by LMP c/w 7wk US presenting for vaginal spotting/bleeding for the past 5 days.    Vaginal Bleeding in Pregnancy  - SSE n/f small volume dark blood in vault ~5cc, nabothian cyst on anterior lip of cervix, friable, bleeds with touch  - SVE /-3, toco acontractile.   - Vaginitis gram stain, STI testing sent  - UA n/f whole blood, leuks  - CBC, Coags, Fibrinogen sent, nml.  - Low concern for abruption given benign abdominal exam, cervical bleeding noted on exam, a contractile, normal abruption labs.    IUP at 30w0d   - NST AGA  - PNC up to date  - Follow up infectious testing results  - Strict return precautions discussed  - Has follow up with Dr. Bearden on     Dispo: ok for discharge home with close outpatient follow up, return precautions discussed    Seen and discussed with Dr. Guardado.   Park Dorman MD   PGY-2, Labor and Delivery     Active Problems:  There are no active Hospital Problems.      29w2d Problems (from 24 to present)       Problem Noted Resolved    Multigravida of advanced maternal age in second trimester ([facility]) 2024 by Marjan Bearden MD No    Priority:  Medium      Overview Signed 2024 10:36 AM by Marjan Bearden MD     Rr cf DNA         Supervision of high risk pregnancy, antepartum ([facility]) 2024 by Marjan Bearden MD No    Priority:  Medium      Overview Addendum 2024 11:36 AM by Marjan Bearden MD     [x] Dating: LMP consistent with 7 week ultrasound  [x] Initial BMI: 32  [x] Prenatal Labs: B+ blood type, rubella equivocal, Hgb 12.2  [x] Pap: 2023: NILM/neg HPV  [x] Aneuploidy Screening: rr cfDNA, XX   [x] Baby ASA: Not indicated  [x] Anatomy US: normal  []  1hr GCT at 24-28wks: ordered, will go this week  [] Tdap (27-36wks): discussed , desires to get at [facility]  [x] Flu Shot: NA  [] COVID vaccine:   [x] Rhogam (if Rh neg): Not indicated  [] Third trimester growth:   [] GBS at 36 wks:  [x] Breastfeeding: Desires to bf  [x] PPBC: Discussed options, desires nexplanon immediately postpartum   [] 39 weeks discussion of IOL vs. Expectant management:  [x] Mode of delivery: Desires another TOLAC           Pregnancy in first trimester with history of ectopic pregnancy ([facility]) 2/3/2023 by Ita Gorman MD 3/15/2024 by Ita Gorman MD          Subjective   34 y.o.  at 30w0d by LMP c/w 7wk US presenting for vaginal spotting/bleeding for the past 5 days.  States she was seen a few days ago after having intercourse and noticing vaginal spotting then. Unfortunately she has had continued bleeding since then, that has been intermittent spotting and rare small clots. She notes mild cramping but does not feel like these are labor pains. Denies UTI sx, changes in vaginal discharge. Reporting GFM. Denies LOF.    Pregnancy Notable for:  - H/o  section x1 in first pregnancy, x2 subsequent VBACs  - AMA     Obstetrical History   OB History    Para Term  AB Living   7 3 2 1 3 3   SAB IAB Ectopic Multiple Live Births   1 0 2 0 3      # Outcome Date GA Lbr Tree/2nd Weight Sex Type Anes PTL Lv   7 Current            6 Ectopic 2023           5 Ectopic 2021           4 SAB 2016           3 Term 14 40w0d  3.175 kg M Vag-Spont  N CANDE   2 Term 08/10/10 40w0d  2.722 kg M Vag-Spont  N CANDE   1  08 35w0d  2.722 kg M CS-Unspec  Y CANDE      Complications: Breathing problem in infant       Past Medical History  Past Medical History:   Diagnosis Date    Pregnancy in first trimester with history of ectopic pregnancy ([facility]) 2023        Past Surgical History   Past Surgical History:   Procedure Laterality Date     SECTION, CLASSIC        SECTION, LOW TRANSVERSE         Social History  Social History     Tobacco Use    Smoking status: Never    Smokeless tobacco: Never   Substance Use Topics    Alcohol use: Not Currently     Substance and Sexual Activity   Drug Use Never       Allergies  Patient has no known allergies.     Medications  No medications prior to admission.       Objective    Last Vitals  Temp Pulse Resp BP MAP O2 Sat   36.8 °C (98.2 °F) 60   138/69   97 %     Physical Examination  General: Lying in bed in NAD  Obstetric:   FHT: 140 bpm, moderate variability, + accels, - decels  SSE: small volume dark blood in vault ~5cc, nabothian cyst on anterior lip of cervix, friable, bleeds with touch  SVE: /-3  Abd: soft, gravid, nontender  Skin: No rashes/lesions/erythema  Neuro: Awake, alert, conversational  CV: Regular rate, warm and well perfused  Respiratory: Even and unlabored on RA  Extremities: No edema, discoloration, or pain in BLE  Psych: appropriate mood and affect     Lab Review  Lab Results   Component Value Date    WBC 6.0 2024    HGB 10.0 (L) 2024    HCT 30.8 (L) 2024     2024     Lab Results   Component Value Date    APTT 28 2024    PROTIME 10.9 2024    INR 1.0 2024     Lab Results   Component Value Date    FIBRINOGEN 502 (H) 2024

## 2024-07-22 ENCOUNTER — APPOINTMENT (OUTPATIENT)
Dept: OBSTETRICS AND GYNECOLOGY | Facility: CLINIC | Age: 34
End: 2024-07-22
Payer: COMMERCIAL

## 2024-07-22 VITALS — BODY MASS INDEX: 35.14 KG/M2 | SYSTOLIC BLOOD PRESSURE: 120 MMHG | DIASTOLIC BLOOD PRESSURE: 70 MMHG | WEIGHT: 186 LBS

## 2024-07-22 DIAGNOSIS — O46.93: ICD-10-CM

## 2024-07-22 DIAGNOSIS — O09.90 SUPERVISION OF HIGH RISK PREGNANCY, ANTEPARTUM (HHS-HCC): Primary | ICD-10-CM

## 2024-07-22 DIAGNOSIS — Z98.891 HISTORY OF CESAREAN SECTION: ICD-10-CM

## 2024-07-22 DIAGNOSIS — O09.522 MULTIGRAVIDA OF ADVANCED MATERNAL AGE IN SECOND TRIMESTER (HHS-HCC): ICD-10-CM

## 2024-07-22 LAB
APPEARANCE UR: CLEAR
BILIRUB UR STRIP.AUTO-MCNC: NEGATIVE MG/DL
CLUE CELLS VAG LPF-#/AREA: ABNORMAL /[LPF]
COLOR UR: YELLOW
GLUCOSE UR STRIP.AUTO-MCNC: NORMAL MG/DL
KETONES UR STRIP.AUTO-MCNC: NEGATIVE MG/DL
LEUKOCYTE ESTERASE UR QL STRIP.AUTO: ABNORMAL
MUCOUS THREADS #/AREA URNS AUTO: NORMAL /LPF
NITRITE UR QL STRIP.AUTO: NEGATIVE
NUGENT SCORE: 8
PH UR STRIP.AUTO: 6 [PH]
PROT UR STRIP.AUTO-MCNC: ABNORMAL MG/DL
RBC # UR STRIP.AUTO: ABNORMAL /UL
RBC #/AREA URNS AUTO: NORMAL /HPF
SP GR UR STRIP.AUTO: 1.03
SQUAMOUS #/AREA URNS AUTO: NORMAL /HPF
UROBILINOGEN UR STRIP.AUTO-MCNC: NORMAL MG/DL
WBC #/AREA URNS AUTO: NORMAL /HPF
YEAST VAG WET PREP-#/AREA: ABNORMAL

## 2024-07-22 PROCEDURE — 99213 OFFICE O/P EST LOW 20 MIN: CPT | Performed by: OBSTETRICS & GYNECOLOGY

## 2024-07-22 PROCEDURE — 1036F TOBACCO NON-USER: CPT | Performed by: OBSTETRICS & GYNECOLOGY

## 2024-07-22 PROCEDURE — 81001 URINALYSIS AUTO W/SCOPE: CPT

## 2024-07-22 PROCEDURE — 87086 URINE CULTURE/COLONY COUNT: CPT

## 2024-07-22 NOTE — ASSESSMENT & PLAN NOTE
07/20/24: seen in OB ED for vaginal bleeding determined to be 2/2 to nabothian cyst at that time  07/22/24: Follow -up office visit. Bleeding resolved. Recommend 3rd trim growth US to further r/o any fetal or placental abnormalities. Ordered today.

## 2024-07-22 NOTE — PROGRESS NOTES
I saw and evaluated the patient. I personally obtained the key and critical portions of the history and physical exam or was physically present for key and critical portions performed by the resident/fellow. I reviewed the resident/fellow's documentation and discussed the patient with the resident/fellow. I agree with the resident/fellow's medical decision making as documented in the note.    Marjan Bearden MD

## 2024-07-22 NOTE — PROGRESS NOTES
OB Follow up visit    ASSESSMENT & PLAN    Jacqueline Ambriz is a 34 y.o.  at 30w2d presenting for routine prenatal care.    Problem List Items Addressed This Visit       History of  section    Overview     2 successful VBACs  Desires another TOLAC         Multigravida of advanced maternal age in second trimester ([facility])    Overview     Rr cf DNA         Supervision of high risk pregnancy, antepartum ([facility]) - Primary    Overview     [x] Dating: LMP consistent with 7 week ultrasound  [x] Initial BMI: 32  [x] Prenatal Labs: B+ blood type, rubella equivocal, Hgb 12.2 -> 11.2 (second trimester)  [x] Pap: 2023: NILM/neg HPV  [x] Aneuploidy Screening: rr cfDNA, XX   [x] Baby ASA: Not indicated  [x] Anatomy US: normal  [x] 1hr GCT at 24-28wks: normal, 118  [x] Tdap (27-36wks): done  [x] Flu Shot: NA  [] COVID vaccine:   [x] Rhogam (if Rh neg): Not indicated  [] Third trimester growth:   [] GBS at 36 wks:  [x] Breastfeeding: Desires to bf  [x] PPBC: Discussed options, desires nexplanon immediately postpartum   [] 39 weeks discussion of IOL vs. Expectant management:  [x] Mode of delivery: Desires another TOLAC           Relevant Orders    US MAC OB imaging order    Urinalysis with Reflex Culture and Microscopic    Third trimester bleeding, antepartum ([facility])    Current Assessment & Plan     24: seen in OB ED for vaginal bleeding determined to be secondary to nabothian cyst at that time  24: Follow -up office visit. Bleeding resolved. Recommend 3rd trim growth US to further r/o any fetal or placental abnormalities. Ordered today.             RTC in 2 weeks    Pt seen and discussed w/ Dr. Bearden.  Chinyere Rivero MD  OBGYN, PGY-1     SUBJECTIVE    HPI: Jacqueline Ambriz is a 34 y.o.  at 30w2d here for follow-up after being seen in the OB ED. She presented on  for vaginal bleeding with passage of small clots. Work-up revealed a nabothian cyst as likely cause. All other testing  negative for abruption or PTL.     Today pt reports bleeding has stopped. Her only complaint is increased urinary urgency. Otherwise denies contractions or LOF. Endorses good fetal movement.       OBJECTIVE    Visit Vitals  /70   Wt 84.4 kg (186 lb)   LMP 12/23/2023   BMI 35.14 kg/m²   OB Status Pregnant   Smoking Status Never   BSA 1.91 m²

## 2024-07-23 ENCOUNTER — TELEPHONE (OUTPATIENT)
Dept: OBSTETRICS AND GYNECOLOGY | Facility: HOSPITAL | Age: 34
End: 2024-07-23
Payer: COMMERCIAL

## 2024-07-23 DIAGNOSIS — B96.89 BACTERIAL VAGINOSIS: Primary | ICD-10-CM

## 2024-07-23 DIAGNOSIS — N76.0 BACTERIAL VAGINOSIS: Primary | ICD-10-CM

## 2024-07-23 LAB — HOLD SPECIMEN: NORMAL

## 2024-07-23 RX ORDER — METRONIDAZOLE 500 MG/1
500 TABLET ORAL 2 TIMES DAILY
Qty: 14 TABLET | Refills: 0 | Status: SHIPPED | OUTPATIENT
Start: 2024-07-23 | End: 2024-07-30

## 2024-07-24 LAB — BACTERIA UR CULT: NO GROWTH

## 2024-07-25 ENCOUNTER — HOSPITAL ENCOUNTER (OUTPATIENT)
Dept: RADIOLOGY | Facility: HOSPITAL | Age: 34
Discharge: HOME | End: 2024-07-25
Payer: COMMERCIAL

## 2024-07-25 DIAGNOSIS — O09.90 SUPERVISION OF HIGH RISK PREGNANCY, ANTEPARTUM (HHS-HCC): ICD-10-CM

## 2024-07-25 PROCEDURE — 76816 OB US FOLLOW-UP PER FETUS: CPT

## 2024-07-25 PROCEDURE — 76819 FETAL BIOPHYS PROFIL W/O NST: CPT

## 2024-07-29 ENCOUNTER — APPOINTMENT (OUTPATIENT)
Dept: RADIOLOGY | Facility: HOSPITAL | Age: 34
End: 2024-07-29
Payer: COMMERCIAL

## 2024-07-29 ENCOUNTER — APPOINTMENT (OUTPATIENT)
Dept: OBSTETRICS AND GYNECOLOGY | Facility: CLINIC | Age: 34
End: 2024-07-29
Payer: COMMERCIAL

## 2024-07-29 VITALS — WEIGHT: 193.4 LBS | DIASTOLIC BLOOD PRESSURE: 92 MMHG | SYSTOLIC BLOOD PRESSURE: 163 MMHG | BODY MASS INDEX: 36.54 KG/M2

## 2024-07-29 DIAGNOSIS — O16.3 ELEVATED BLOOD PRESSURE AFFECTING PREGNANCY IN THIRD TRIMESTER, ANTEPARTUM (HHS-HCC): ICD-10-CM

## 2024-07-29 DIAGNOSIS — Z3A.31 31 WEEKS GESTATION OF PREGNANCY (HHS-HCC): Primary | ICD-10-CM

## 2024-07-29 PROBLEM — O14.13 SEVERE PRE-ECLAMPSIA IN THIRD TRIMESTER (HHS-HCC): Status: ACTIVE | Noted: 2024-07-29

## 2024-07-29 LAB
ABO GROUP (TYPE) IN BLOOD: NORMAL
ALBUMIN SERPL BCP-MCNC: 3 G/DL (ref 3.4–5)
ALBUMIN SERPL BCP-MCNC: 3.1 G/DL (ref 3.4–5)
ALP SERPL-CCNC: 114 U/L (ref 33–110)
ALP SERPL-CCNC: 115 U/L (ref 33–110)
ALT SERPL W P-5'-P-CCNC: 22 U/L (ref 7–45)
ALT SERPL W P-5'-P-CCNC: 22 U/L (ref 7–45)
ANION GAP SERPL CALC-SCNC: 11 MMOL/L (ref 10–20)
ANION GAP SERPL CALC-SCNC: 14 MMOL/L (ref 10–20)
ANTIBODY SCREEN: NORMAL
APTT PPP: 28 SECONDS (ref 27–38)
AST SERPL W P-5'-P-CCNC: 23 U/L (ref 9–39)
AST SERPL W P-5'-P-CCNC: 26 U/L (ref 9–39)
BILIRUB SERPL-MCNC: 0.2 MG/DL (ref 0–1.2)
BILIRUB SERPL-MCNC: 0.2 MG/DL (ref 0–1.2)
BUN SERPL-MCNC: 11 MG/DL (ref 6–23)
BUN SERPL-MCNC: 11 MG/DL (ref 6–23)
CALCIUM SERPL-MCNC: 8.5 MG/DL (ref 8.6–10.6)
CALCIUM SERPL-MCNC: 8.7 MG/DL (ref 8.6–10.6)
CHLORIDE SERPL-SCNC: 107 MMOL/L (ref 98–107)
CHLORIDE SERPL-SCNC: 110 MMOL/L (ref 98–107)
CO2 SERPL-SCNC: 17 MMOL/L (ref 21–32)
CO2 SERPL-SCNC: 20 MMOL/L (ref 21–32)
CREAT SERPL-MCNC: 0.75 MG/DL (ref 0.5–1.05)
CREAT SERPL-MCNC: 0.79 MG/DL (ref 0.5–1.05)
CREAT UR-MCNC: 416.3 MG/DL (ref 20–320)
EGFRCR SERPLBLD CKD-EPI 2021: >90 ML/MIN/1.73M*2
EGFRCR SERPLBLD CKD-EPI 2021: >90 ML/MIN/1.73M*2
ERYTHROCYTE [DISTWIDTH] IN BLOOD BY AUTOMATED COUNT: 15.7 % (ref 11.5–14.5)
ERYTHROCYTE [DISTWIDTH] IN BLOOD BY AUTOMATED COUNT: 16 % (ref 11.5–14.5)
FIBRINOGEN PPP-MCNC: 502 MG/DL (ref 200–400)
GLUCOSE SERPL-MCNC: 129 MG/DL (ref 74–99)
GLUCOSE SERPL-MCNC: 77 MG/DL (ref 74–99)
HCT VFR BLD AUTO: 33.6 % (ref 36–46)
HCT VFR BLD AUTO: 35.5 % (ref 36–46)
HGB BLD-MCNC: 11.1 G/DL (ref 12–16)
HGB BLD-MCNC: 11.5 G/DL (ref 12–16)
INR PPP: 0.9 (ref 0.9–1.1)
LDH SERPL L TO P-CCNC: 189 U/L (ref 84–246)
MCH RBC QN AUTO: 25.9 PG (ref 26–34)
MCH RBC QN AUTO: 26.3 PG (ref 26–34)
MCHC RBC AUTO-ENTMCNC: 32.4 G/DL (ref 32–36)
MCHC RBC AUTO-ENTMCNC: 33 G/DL (ref 32–36)
MCV RBC AUTO: 79 FL (ref 80–100)
MCV RBC AUTO: 81 FL (ref 80–100)
NRBC BLD-RTO: 0 /100 WBCS (ref 0–0)
NRBC BLD-RTO: 0 /100 WBCS (ref 0–0)
PLATELET # BLD AUTO: 185 X10*3/UL (ref 150–450)
PLATELET # BLD AUTO: 196 X10*3/UL (ref 150–450)
POC APPEARANCE, URINE: CLEAR
POC BILIRUBIN, URINE: ABNORMAL
POC BLOOD, URINE: ABNORMAL
POC COLOR, URINE: YELLOW
POC GLUCOSE, URINE: NEGATIVE MG/DL
POC KETONES, URINE: ABNORMAL MG/DL
POC LEUKOCYTES, URINE: NEGATIVE
POC NITRITE,URINE: NEGATIVE
POC PH, URINE: 6 PH
POC PROTEIN, URINE: ABNORMAL MG/DL
POC SPECIFIC GRAVITY, URINE: >=1.03
POC UROBILINOGEN, URINE: 0.2 EU/DL
POTASSIUM SERPL-SCNC: 4.1 MMOL/L (ref 3.5–5.3)
POTASSIUM SERPL-SCNC: 4.2 MMOL/L (ref 3.5–5.3)
PROT SERPL-MCNC: 5.9 G/DL (ref 6.4–8.2)
PROT SERPL-MCNC: 6 G/DL (ref 6.4–8.2)
PROT UR-ACNC: 189 MG/DL (ref 5–24)
PROT/CREAT UR: 0.45 MG/MG CREAT (ref 0–0.17)
PROTHROMBIN TIME: 10.1 SECONDS (ref 9.8–12.8)
RBC # BLD AUTO: 4.28 X10*6/UL (ref 4–5.2)
RBC # BLD AUTO: 4.38 X10*6/UL (ref 4–5.2)
RH FACTOR (ANTIGEN D): NORMAL
SODIUM SERPL-SCNC: 134 MMOL/L (ref 136–145)
SODIUM SERPL-SCNC: 137 MMOL/L (ref 136–145)
TREPONEMA PALLIDUM IGG+IGM AB [PRESENCE] IN SERUM OR PLASMA BY IMMUNOASSAY: NONREACTIVE
WBC # BLD AUTO: 6.2 X10*3/UL (ref 4.4–11.3)
WBC # BLD AUTO: 9.3 X10*3/UL (ref 4.4–11.3)

## 2024-07-29 PROCEDURE — 7210000002 HC LABOR PER HOUR

## 2024-07-29 PROCEDURE — 82570 ASSAY OF URINE CREATININE: CPT

## 2024-07-29 PROCEDURE — 99214 OFFICE O/P EST MOD 30 MIN: CPT | Performed by: NURSE PRACTITIONER

## 2024-07-29 PROCEDURE — 85610 PROTHROMBIN TIME: CPT

## 2024-07-29 PROCEDURE — 80053 COMPREHEN METABOLIC PANEL: CPT

## 2024-07-29 PROCEDURE — 86901 BLOOD TYPING SEROLOGIC RH(D): CPT

## 2024-07-29 PROCEDURE — 2500000002 HC RX 250 W HCPCS SELF ADMINISTERED DRUGS (ALT 637 FOR MEDICARE OP, ALT 636 FOR OP/ED)

## 2024-07-29 PROCEDURE — 36415 COLL VENOUS BLD VENIPUNCTURE: CPT

## 2024-07-29 PROCEDURE — 2500000004 HC RX 250 GENERAL PHARMACY W/ HCPCS (ALT 636 FOR OP/ED)

## 2024-07-29 PROCEDURE — 70544 MR ANGIOGRAPHY HEAD W/O DYE: CPT

## 2024-07-29 PROCEDURE — 99199 UNLISTED SPECIAL SVC PX/RPRT: CPT

## 2024-07-29 PROCEDURE — 99254 IP/OBS CNSLTJ NEW/EST MOD 60: CPT

## 2024-07-29 PROCEDURE — 2500000001 HC RX 250 WO HCPCS SELF ADMINISTERED DRUGS (ALT 637 FOR MEDICARE OP)

## 2024-07-29 PROCEDURE — 59025 FETAL NON-STRESS TEST: CPT

## 2024-07-29 PROCEDURE — 85384 FIBRINOGEN ACTIVITY: CPT

## 2024-07-29 PROCEDURE — 86780 TREPONEMA PALLIDUM: CPT

## 2024-07-29 PROCEDURE — 1120000001 HC OB PRIVATE ROOM DAILY

## 2024-07-29 PROCEDURE — 96372 THER/PROPH/DIAG INJ SC/IM: CPT

## 2024-07-29 PROCEDURE — 59050 FETAL MONITOR W/REPORT: CPT

## 2024-07-29 PROCEDURE — 83615 LACTATE (LD) (LDH) ENZYME: CPT

## 2024-07-29 PROCEDURE — 87081 CULTURE SCREEN ONLY: CPT

## 2024-07-29 PROCEDURE — 99222 1ST HOSP IP/OBS MODERATE 55: CPT

## 2024-07-29 PROCEDURE — 99215 OFFICE O/P EST HI 40 MIN: CPT | Mod: 25

## 2024-07-29 PROCEDURE — 99252 IP/OBS CONSLTJ NEW/EST SF 35: CPT | Performed by: STUDENT IN AN ORGANIZED HEALTH CARE EDUCATION/TRAINING PROGRAM

## 2024-07-29 PROCEDURE — 70551 MRI BRAIN STEM W/O DYE: CPT

## 2024-07-29 PROCEDURE — 85027 COMPLETE CBC AUTOMATED: CPT

## 2024-07-29 PROCEDURE — 86900 BLOOD TYPING SEROLOGIC ABO: CPT

## 2024-07-29 PROCEDURE — 70551 MRI BRAIN STEM W/O DYE: CPT | Performed by: STUDENT IN AN ORGANIZED HEALTH CARE EDUCATION/TRAINING PROGRAM

## 2024-07-29 RX ORDER — NIFEDIPINE 60 MG/1
60 TABLET, FILM COATED, EXTENDED RELEASE ORAL EVERY 12 HOURS
Status: DISCONTINUED | OUTPATIENT
Start: 2024-07-30 | End: 2024-08-07

## 2024-07-29 RX ORDER — ACETAMINOPHEN 325 MG/1
975 TABLET ORAL ONCE
Status: COMPLETED | OUTPATIENT
Start: 2024-07-29 | End: 2024-07-29

## 2024-07-29 RX ORDER — CALCIUM GLUCONATE 98 MG/ML
1 INJECTION, SOLUTION INTRAVENOUS ONCE AS NEEDED
Status: DISCONTINUED | OUTPATIENT
Start: 2024-07-29 | End: 2024-07-30 | Stop reason: ALTCHOICE

## 2024-07-29 RX ORDER — LABETALOL HYDROCHLORIDE 5 MG/ML
20 INJECTION, SOLUTION INTRAVENOUS ONCE AS NEEDED
Status: COMPLETED | OUTPATIENT
Start: 2024-07-29 | End: 2024-07-29

## 2024-07-29 RX ORDER — LIDOCAINE HYDROCHLORIDE 10 MG/ML
0.5 INJECTION INFILTRATION; PERINEURAL ONCE AS NEEDED
Status: DISCONTINUED | OUTPATIENT
Start: 2024-07-29 | End: 2024-08-01

## 2024-07-29 RX ORDER — NIFEDIPINE 90 MG/1
90 TABLET, EXTENDED RELEASE ORAL
Status: DISCONTINUED | OUTPATIENT
Start: 2024-07-30 | End: 2024-07-29

## 2024-07-29 RX ORDER — CYPROHEPTADINE HYDROCHLORIDE 4 MG/1
4 TABLET ORAL ONCE
Status: COMPLETED | OUTPATIENT
Start: 2024-07-29 | End: 2024-07-29

## 2024-07-29 RX ORDER — OXYTOCIN 10 [USP'U]/ML
10 INJECTION, SOLUTION INTRAMUSCULAR; INTRAVENOUS ONCE AS NEEDED
Status: DISCONTINUED | OUTPATIENT
Start: 2024-07-29 | End: 2024-07-29

## 2024-07-29 RX ORDER — NIFEDIPINE 30 MG/1
30 TABLET, FILM COATED, EXTENDED RELEASE ORAL ONCE
Status: COMPLETED | OUTPATIENT
Start: 2024-07-30 | End: 2024-07-29

## 2024-07-29 RX ORDER — LABETALOL HYDROCHLORIDE 5 MG/ML
INJECTION, SOLUTION INTRAVENOUS
Status: COMPLETED
Start: 2024-07-29 | End: 2024-07-29

## 2024-07-29 RX ORDER — CAFFEINE 200 MG
200 TABLET ORAL ONCE
Status: DISCONTINUED | OUTPATIENT
Start: 2024-07-29 | End: 2024-08-01 | Stop reason: ALTCHOICE

## 2024-07-29 RX ORDER — ONDANSETRON 4 MG/1
4 TABLET, FILM COATED ORAL EVERY 6 HOURS PRN
Status: DISCONTINUED | OUTPATIENT
Start: 2024-07-29 | End: 2024-08-01

## 2024-07-29 RX ORDER — HYDRALAZINE HYDROCHLORIDE 20 MG/ML
5 INJECTION INTRAMUSCULAR; INTRAVENOUS ONCE AS NEEDED
Status: DISCONTINUED | OUTPATIENT
Start: 2024-07-29 | End: 2024-07-29

## 2024-07-29 RX ORDER — DIPHENHYDRAMINE HCL 25 MG
25 CAPSULE ORAL ONCE
Status: COMPLETED | OUTPATIENT
Start: 2024-07-29 | End: 2024-07-29

## 2024-07-29 RX ORDER — LABETALOL HYDROCHLORIDE 5 MG/ML
40 INJECTION, SOLUTION INTRAVENOUS ONCE
Status: COMPLETED | OUTPATIENT
Start: 2024-07-29 | End: 2024-07-29

## 2024-07-29 RX ORDER — METOCLOPRAMIDE HYDROCHLORIDE 5 MG/ML
10 INJECTION INTRAMUSCULAR; INTRAVENOUS ONCE
Status: COMPLETED | OUTPATIENT
Start: 2024-07-29 | End: 2024-07-29

## 2024-07-29 RX ORDER — BETAMETHASONE SODIUM PHOSPHATE AND BETAMETHASONE ACETATE 3; 3 MG/ML; MG/ML
12 INJECTION, SUSPENSION INTRA-ARTICULAR; INTRALESIONAL; INTRAMUSCULAR; SOFT TISSUE EVERY 24 HOURS
Status: DISCONTINUED | OUTPATIENT
Start: 2024-07-29 | End: 2024-07-29

## 2024-07-29 RX ORDER — MAGNESIUM SULFATE HEPTAHYDRATE 40 MG/ML
2 INJECTION, SOLUTION INTRAVENOUS CONTINUOUS
Status: DISCONTINUED | OUTPATIENT
Start: 2024-07-29 | End: 2024-07-30 | Stop reason: ALTCHOICE

## 2024-07-29 RX ORDER — CAFFEINE 200 MG
200 TABLET ORAL ONCE
Status: COMPLETED | OUTPATIENT
Start: 2024-07-29 | End: 2024-07-29

## 2024-07-29 RX ORDER — OXYTOCIN/0.9 % SODIUM CHLORIDE 30/500 ML
60 PLASTIC BAG, INJECTION (ML) INTRAVENOUS ONCE AS NEEDED
Status: DISCONTINUED | OUTPATIENT
Start: 2024-07-29 | End: 2024-07-29

## 2024-07-29 RX ORDER — NIFEDIPINE 30 MG/1
30 TABLET, FILM COATED, EXTENDED RELEASE ORAL
Status: DISCONTINUED | OUTPATIENT
Start: 2024-07-29 | End: 2024-07-29

## 2024-07-29 RX ORDER — METOCLOPRAMIDE 10 MG/1
10 TABLET ORAL EVERY 6 HOURS PRN
Status: DISCONTINUED | OUTPATIENT
Start: 2024-07-29 | End: 2024-07-29

## 2024-07-29 RX ORDER — HYDRALAZINE HYDROCHLORIDE 20 MG/ML
INJECTION INTRAMUSCULAR; INTRAVENOUS
Status: COMPLETED
Start: 2024-07-29 | End: 2024-07-29

## 2024-07-29 RX ORDER — HYDRALAZINE HYDROCHLORIDE 20 MG/ML
10 INJECTION INTRAMUSCULAR; INTRAVENOUS ONCE
Status: COMPLETED | OUTPATIENT
Start: 2024-07-29 | End: 2024-07-29

## 2024-07-29 RX ORDER — NIFEDIPINE 60 MG/1
60 TABLET, FILM COATED, EXTENDED RELEASE ORAL
Status: DISCONTINUED | OUTPATIENT
Start: 2024-07-30 | End: 2024-07-29

## 2024-07-29 RX ORDER — ONDANSETRON HYDROCHLORIDE 2 MG/ML
4 INJECTION, SOLUTION INTRAVENOUS EVERY 6 HOURS PRN
Status: DISCONTINUED | OUTPATIENT
Start: 2024-07-29 | End: 2024-08-01

## 2024-07-29 RX ORDER — HYDRALAZINE HYDROCHLORIDE 20 MG/ML
5 INJECTION INTRAMUSCULAR; INTRAVENOUS ONCE AS NEEDED
Status: COMPLETED | OUTPATIENT
Start: 2024-07-29 | End: 2024-07-29

## 2024-07-29 RX ORDER — NIFEDIPINE 30 MG/1
30 TABLET, FILM COATED, EXTENDED RELEASE ORAL ONCE
Status: COMPLETED | OUTPATIENT
Start: 2024-07-29 | End: 2024-07-29

## 2024-07-29 RX ORDER — LIDOCAINE HYDROCHLORIDE 10 MG/ML
30 INJECTION INFILTRATION; PERINEURAL ONCE AS NEEDED
Status: DISCONTINUED | OUTPATIENT
Start: 2024-07-29 | End: 2024-07-29

## 2024-07-29 RX ORDER — TERBUTALINE SULFATE 1 MG/ML
0.25 INJECTION SUBCUTANEOUS ONCE AS NEEDED
Status: DISCONTINUED | OUTPATIENT
Start: 2024-07-29 | End: 2024-07-29

## 2024-07-29 RX ORDER — DIPHENHYDRAMINE HYDROCHLORIDE 50 MG/ML
25 INJECTION INTRAMUSCULAR; INTRAVENOUS ONCE
Status: COMPLETED | OUTPATIENT
Start: 2024-07-29 | End: 2024-07-29

## 2024-07-29 RX ORDER — METOCLOPRAMIDE HYDROCHLORIDE 5 MG/ML
10 INJECTION INTRAMUSCULAR; INTRAVENOUS EVERY 6 HOURS PRN
Status: DISCONTINUED | OUTPATIENT
Start: 2024-07-29 | End: 2024-07-29

## 2024-07-29 RX ORDER — MISOPROSTOL 200 UG/1
800 TABLET ORAL ONCE AS NEEDED
Status: DISCONTINUED | OUTPATIENT
Start: 2024-07-29 | End: 2024-07-29

## 2024-07-29 RX ORDER — METHYLERGONOVINE MALEATE 0.2 MG/ML
0.2 INJECTION INTRAVENOUS ONCE AS NEEDED
Status: DISCONTINUED | OUTPATIENT
Start: 2024-07-29 | End: 2024-07-29

## 2024-07-29 RX ORDER — METOCLOPRAMIDE 10 MG/1
10 TABLET ORAL ONCE
Status: COMPLETED | OUTPATIENT
Start: 2024-07-29 | End: 2024-07-29

## 2024-07-29 RX ORDER — TRANEXAMIC ACID 100 MG/ML
1000 INJECTION, SOLUTION INTRAVENOUS ONCE AS NEEDED
Status: DISCONTINUED | OUTPATIENT
Start: 2024-07-29 | End: 2024-07-29

## 2024-07-29 RX ORDER — CARBOPROST TROMETHAMINE 250 UG/ML
250 INJECTION, SOLUTION INTRAMUSCULAR ONCE AS NEEDED
Status: DISCONTINUED | OUTPATIENT
Start: 2024-07-29 | End: 2024-07-29

## 2024-07-29 RX ORDER — SODIUM CHLORIDE, SODIUM LACTATE, POTASSIUM CHLORIDE, CALCIUM CHLORIDE 600; 310; 30; 20 MG/100ML; MG/100ML; MG/100ML; MG/100ML
125 INJECTION, SOLUTION INTRAVENOUS CONTINUOUS
Status: DISCONTINUED | OUTPATIENT
Start: 2024-07-29 | End: 2024-07-29

## 2024-07-29 RX ORDER — SODIUM CHLORIDE, SODIUM LACTATE, POTASSIUM CHLORIDE, CALCIUM CHLORIDE 600; 310; 30; 20 MG/100ML; MG/100ML; MG/100ML; MG/100ML
50 INJECTION, SOLUTION INTRAVENOUS CONTINUOUS
Status: DISCONTINUED | OUTPATIENT
Start: 2024-07-29 | End: 2024-08-01

## 2024-07-29 RX ORDER — CYCLOBENZAPRINE HCL 10 MG
10 TABLET ORAL ONCE
Status: COMPLETED | OUTPATIENT
Start: 2024-07-29 | End: 2024-07-29

## 2024-07-29 RX ORDER — LOPERAMIDE HYDROCHLORIDE 2 MG/1
4 CAPSULE ORAL EVERY 2 HOUR PRN
Status: DISCONTINUED | OUTPATIENT
Start: 2024-07-29 | End: 2024-07-29

## 2024-07-29 RX ORDER — NIFEDIPINE 10 MG/1
10 CAPSULE ORAL ONCE AS NEEDED
Status: DISCONTINUED | OUTPATIENT
Start: 2024-07-29 | End: 2024-08-01

## 2024-07-29 RX ADMIN — ACETAMINOPHEN 975 MG: 325 TABLET ORAL at 10:52

## 2024-07-29 RX ADMIN — METOCLOPRAMIDE 10 MG: 10 TABLET ORAL at 10:52

## 2024-07-29 RX ADMIN — HYDRALAZINE HYDROCHLORIDE 5 MG: 20 INJECTION INTRAMUSCULAR; INTRAVENOUS at 10:41

## 2024-07-29 RX ADMIN — LABETALOL HYDROCHLORIDE 20 MG: 5 INJECTION, SOLUTION INTRAVENOUS at 12:13

## 2024-07-29 RX ADMIN — LABETALOL HYDROCHLORIDE 40 MG: 5 INJECTION INTRAVENOUS at 20:24

## 2024-07-29 RX ADMIN — NIFEDIPINE 30 MG: 30 TABLET, FILM COATED, EXTENDED RELEASE ORAL at 13:12

## 2024-07-29 RX ADMIN — ONDANSETRON 4 MG: 2 INJECTION INTRAMUSCULAR; INTRAVENOUS at 10:47

## 2024-07-29 RX ADMIN — BETAMETHASONE ACETATE AND BETAMETHASONE SODIUM PHOSPHATE 12 MG: 3; 3 INJECTION, SUSPENSION INTRA-ARTICULAR; INTRALESIONAL; INTRAMUSCULAR; SOFT TISSUE at 11:42

## 2024-07-29 RX ADMIN — LABETALOL HYDROCHLORIDE 20 MG: 5 INJECTION INTRAVENOUS at 20:03

## 2024-07-29 RX ADMIN — METOCLOPRAMIDE 10 MG: 10 TABLET ORAL at 20:51

## 2024-07-29 RX ADMIN — MAGNESIUM SULFATE HEPTAHYDRATE 2 G/HR: 40 INJECTION, SOLUTION INTRAVENOUS at 19:00

## 2024-07-29 RX ADMIN — DIPHENHYDRAMINE HYDROCHLORIDE 25 MG: 25 CAPSULE ORAL at 10:52

## 2024-07-29 RX ADMIN — NIFEDIPINE 30 MG: 30 TABLET, FILM COATED, EXTENDED RELEASE ORAL at 11:46

## 2024-07-29 RX ADMIN — CYPROHEPTADINE HYDROCHLORIDE 4 MG: 4 TABLET ORAL at 20:51

## 2024-07-29 RX ADMIN — MAGNESIUM SULFATE HEPTAHYDRATE 2 G/HR: 40 INJECTION, SOLUTION INTRAVENOUS at 12:08

## 2024-07-29 RX ADMIN — HYDRALAZINE HYDROCHLORIDE 10 MG: 20 INJECTION INTRAMUSCULAR; INTRAVENOUS at 11:14

## 2024-07-29 RX ADMIN — CAFFEINE 200 MG: 200 TABLET ORAL at 19:38

## 2024-07-29 RX ADMIN — SODIUM CHLORIDE, POTASSIUM CHLORIDE, SODIUM LACTATE AND CALCIUM CHLORIDE 75 ML/HR: 600; 310; 30; 20 INJECTION, SOLUTION INTRAVENOUS at 12:00

## 2024-07-29 RX ADMIN — ACETAMINOPHEN 975 MG: 325 TABLET ORAL at 20:51

## 2024-07-29 RX ADMIN — NIFEDIPINE 30 MG: 30 TABLET, FILM COATED, EXTENDED RELEASE ORAL at 20:29

## 2024-07-29 RX ADMIN — NIFEDIPINE 30 MG: 30 TABLET, FILM COATED, EXTENDED RELEASE ORAL at 23:59

## 2024-07-29 RX ADMIN — CYCLOBENZAPRINE 10 MG: 10 TABLET, FILM COATED ORAL at 13:13

## 2024-07-29 RX ADMIN — DIPHENHYDRAMINE HYDROCHLORIDE 25 MG: 25 CAPSULE ORAL at 20:51

## 2024-07-29 SDOH — SOCIAL STABILITY: SOCIAL INSECURITY: PHYSICAL ABUSE: DENIES

## 2024-07-29 SDOH — HEALTH STABILITY: MENTAL HEALTH: WISH TO BE DEAD (PAST 1 MONTH): NO

## 2024-07-29 SDOH — SOCIAL STABILITY: SOCIAL INSECURITY: ABUSE SCREEN: ADULT

## 2024-07-29 SDOH — HEALTH STABILITY: MENTAL HEALTH: NON-SPECIFIC ACTIVE SUICIDAL THOUGHTS (PAST 1 MONTH): NO

## 2024-07-29 SDOH — HEALTH STABILITY: MENTAL HEALTH: SUICIDAL BEHAVIOR (LIFETIME): NO

## 2024-07-29 SDOH — SOCIAL STABILITY: SOCIAL INSECURITY: HAS ANYONE EVER THREATENED TO HURT YOUR FAMILY OR YOUR PETS?: NO

## 2024-07-29 SDOH — ECONOMIC STABILITY: HOUSING INSECURITY: DO YOU FEEL UNSAFE GOING BACK TO THE PLACE WHERE YOU ARE LIVING?: NO

## 2024-07-29 SDOH — SOCIAL STABILITY: SOCIAL INSECURITY: ARE YOU OR HAVE YOU BEEN THREATENED OR ABUSED PHYSICALLY, EMOTIONALLY, OR SEXUALLY BY ANYONE?: NO

## 2024-07-29 SDOH — SOCIAL STABILITY: SOCIAL INSECURITY: DOES ANYONE TRY TO KEEP YOU FROM HAVING/CONTACTING OTHER FRIENDS OR DOING THINGS OUTSIDE YOUR HOME?: NO

## 2024-07-29 SDOH — SOCIAL STABILITY: SOCIAL INSECURITY: HAVE YOU HAD THOUGHTS OF HARMING ANYONE ELSE?: NO

## 2024-07-29 SDOH — HEALTH STABILITY: MENTAL HEALTH: HAVE YOU USED ANY SUBSTANCES (CANABIS, COCAINE, HEROIN, HALLUCINOGENS, INHALANTS, ETC.) IN THE PAST 12 MONTHS?: NO

## 2024-07-29 SDOH — SOCIAL STABILITY: SOCIAL INSECURITY: ARE THERE ANY APPARENT SIGNS OF INJURIES/BEHAVIORS THAT COULD BE RELATED TO ABUSE/NEGLECT?: NO

## 2024-07-29 SDOH — HEALTH STABILITY: MENTAL HEALTH: WERE YOU ABLE TO COMPLETE ALL THE BEHAVIORAL HEALTH SCREENINGS?: YES

## 2024-07-29 SDOH — SOCIAL STABILITY: SOCIAL INSECURITY: VERBAL ABUSE: DENIES

## 2024-07-29 SDOH — SOCIAL STABILITY: SOCIAL INSECURITY: DO YOU FEEL ANYONE HAS EXPLOITED OR TAKEN ADVANTAGE OF YOU FINANCIALLY OR OF YOUR PERSONAL PROPERTY?: NO

## 2024-07-29 SDOH — SOCIAL STABILITY: SOCIAL INSECURITY: HAVE YOU HAD ANY THOUGHTS OF HARMING ANYONE ELSE?: NO

## 2024-07-29 SDOH — HEALTH STABILITY: MENTAL HEALTH: HAVE YOU USED ANY PRESCRIPTION DRUGS OTHER THAN PRESCRIBED IN THE PAST 12 MONTHS?: NO

## 2024-07-29 ASSESSMENT — PAIN SCALES - GENERAL
PAINLEVEL_OUTOF10: 0 - NO PAIN
PAINLEVEL_OUTOF10: 0 - NO PAIN
PAINLEVEL_OUTOF10: 4
PAINLEVEL_OUTOF10: 4
PAINLEVEL_OUTOF10: 0 - NO PAIN
PAINLEVEL_OUTOF10: 4

## 2024-07-29 ASSESSMENT — LIFESTYLE VARIABLES
HOW OFTEN DO YOU HAVE 6 OR MORE DRINKS ON ONE OCCASION: NEVER
SKIP TO QUESTIONS 9-10: 1
HOW OFTEN DO YOU HAVE A DRINK CONTAINING ALCOHOL: NEVER
HOW MANY STANDARD DRINKS CONTAINING ALCOHOL DO YOU HAVE ON A TYPICAL DAY: PATIENT DOES NOT DRINK
SKIP TO QUESTIONS 9-10: 1
HOW OFTEN DO YOU HAVE A DRINK CONTAINING ALCOHOL: NEVER
AUDIT-C TOTAL SCORE: 0
AUDIT-C TOTAL SCORE: 0
HOW MANY STANDARD DRINKS CONTAINING ALCOHOL DO YOU HAVE ON A TYPICAL DAY: PATIENT DOES NOT DRINK
HOW OFTEN DO YOU HAVE 6 OR MORE DRINKS ON ONE OCCASION: NEVER
AUDIT-C TOTAL SCORE: 0
AUDIT-C TOTAL SCORE: 0

## 2024-07-29 ASSESSMENT — ACTIVITIES OF DAILY LIVING (ADL): LACK_OF_TRANSPORTATION: NO

## 2024-07-29 ASSESSMENT — PATIENT HEALTH QUESTIONNAIRE - PHQ9
1. LITTLE INTEREST OR PLEASURE IN DOING THINGS: NOT AT ALL
2. FEELING DOWN, DEPRESSED OR HOPELESS: NOT AT ALL
SUM OF ALL RESPONSES TO PHQ9 QUESTIONS 1 & 2: 0

## 2024-07-29 ASSESSMENT — PAIN - FUNCTIONAL ASSESSMENT: PAIN_FUNCTIONAL_ASSESSMENT: 0-10

## 2024-07-29 NOTE — PROGRESS NOTES
Antepartum Progress Note    Assessment/Plan   Jacqueline Ambriz is a 34 y.o.  at 31w2d by LMP admitted with sPEC     Preeclampsia with severe features:  - diagnosed by severe range Bps requiring IV treatment  - treated with IV hydral 5/10, labetalol 20. Last at 1215 ()  - PO regimen: nifedipine 60mg   - on Mg 2g/hr   - BMZ  -   - HELLP labs wnl, second set pending   - 4/10 headache on arrival, now 2/10. S/p tylenol, reglan, benadryl, flexeril. Will treat now with caffeine and periactin. Discussed indication for delivery with unrelenting headache if structural cause is ruled out, discussed if headache fails to improve would recommend MRI head.      H/o third trimester bleeding  - seen in triage for VB last week, thought to be 2/2 nabothan cyst  - currently asx   - coags wnl - fibrinogen 509      Fetal status:   - Reassuring, NST reactive   - CEFM while on Mag   - Last ultrasound 24 : EFW 1512g (21%ile), cephalic    - BMZ  -   - NICU consult ordered     Routine:  - GBS collected 24, pending  - TDAP completed 7/15/24  - Flu: n/a  - 1hr normal (118)  - BCM: Nexplanon     Seen and d/w Dr. Partha Meeks MD  PGY3, Obstetrics and Gynecology     Active Problems:    Severe pre-eclampsia in third trimester ([facility])    29w2d Problems (from 24 to present)       Problem Noted Resolved    Severe pre-eclampsia in third trimester ([facility]) 2024 by Elizabeth Stovall, APRN-CNP No    Priority:  Medium      Third trimester bleeding, antepartum ([facility]) 2024 by Chinyere Rivero MD No    Priority:  Medium      Multigravida of advanced maternal age in second trimester ([facility]) 2024 by Marjan Bearden MD No    Priority:  Medium      Overview Signed 2024 10:36 AM by Marjan Bearden MD     Rr cf DNA         Supervision of high risk pregnancy, antepartum ([facility]) 2024 by Marjan Bearden MD No    Priority:  Medium      Overview Addendum 2024  2:40 PM by  Marjan Bearden MD     [x] Dating: LMP consistent with 7 week ultrasound  [x] Initial BMI: 32  [x] Prenatal Labs: B+ blood type, rubella equivocal, Hgb 12.2 -> 11.2 (second trimester)  [x] Pap: 2/2023: NILM/neg HPV  [x] Aneuploidy Screening: rr cfDNA, XX   [x] Baby ASA: Not indicated  [x] Anatomy US: normal  [x] 1hr GCT at 24-28wks: normal, 118  [x] Tdap (27-36wks): done  [x] Flu Shot: NA  [] COVID vaccine:   [x] Rhogam (if Rh neg): Not indicated  [] Third trimester growth:   [] GBS at 36 wks:  [x] Breastfeeding: Desires to bf  [x] PPBC: Discussed options, desires nexplanon immediately postpartum   [] 39 weeks discussion of IOL vs. Expectant management:  [x] Mode of delivery: Desires another TOLAC           Pregnancy in first trimester with history of ectopic pregnancy ([facility]) 2/3/2023 by Ita Gorman MD 3/15/2024 by Ita Gorman MD            Subjective   Pt reports headache is improving from 4/10 to 2/10. Denies VA changes, CP, SOB, RUQ pain.      Objective   Allergies:   Patient has no known allergies.    Last Vitals:  Temp Pulse Resp BP MAP Pulse Ox   36.4 °C (97.5 °F) 92 16 132/72 94 (!) 94 %     Vitals Min/Max Last 24 Hours:  Temp  Min: 36.4 °C (97.5 °F)  Max: 36.4 °C (97.5 °F)  Pulse  Min: 61  Max: 112  Resp  Min: 16  Max: 16  BP  Min: 128/69  Max: 174/110  MAP (mmHg)  Min: 92  Max: 134    Intake/Output:     Intake/Output Summary (Last 24 hours) at 7/29/2024 1804  Last data filed at 7/29/2024 1732  Gross per 24 hour   Intake 733.12 ml   Output 300 ml   Net 433.12 ml       Physical Exam:  General: no acute distress  HEENT: normocephalic, atraumatic  Heart: warm and well perfused  Lungs: no increased WOB  Abdomen: gravid  Extremities: moving all extremities  Neuro: awake and conversant  Psych: appropriate mood and affect     Lab Data:  Lab Results   Component Value Date    WBC 9.3 07/29/2024    HGB 11.1 (L) 07/29/2024    HCT 33.6 (L) 07/29/2024     07/29/2024     Lab Results    Component Value Date    GLUCOSE 129 (H) 07/29/2024     (L) 07/29/2024    K 4.1 07/29/2024     07/29/2024    CO2 17 (L) 07/29/2024    ANIONGAP 14 07/29/2024    BUN 11 07/29/2024    CREATININE 0.75 07/29/2024    EGFR >90 07/29/2024    CALCIUM 8.5 (L) 07/29/2024    ALBUMIN 3.1 (L) 07/29/2024    PROT 5.9 (L) 07/29/2024    ALKPHOS 115 (H) 07/29/2024    ALT 22 07/29/2024    AST 23 07/29/2024    BILITOT 0.2 07/29/2024

## 2024-07-29 NOTE — PROGRESS NOTES
34-year-old G7, P3 here at 31 weeks 2 days gestational age.  She is complaining of headache with visual changes.  Initial blood pressure 163/92 and repeat blood pressure 173/105.  Patient reports yesterday she noticed facial swelling swelling in her hands as well as swelling in her feet and legs.  There has been a 7 pound weight gain since her last visit 1 week ago.    Patient discussed with Dr. Lucas and [facility] [place] [facility] [place] [facility] [place].  Patient is agreeable to go directly to labor and delivery.

## 2024-07-29 NOTE — CONSULTS
MFM Consult    Reason for Consult: Preeclampsia with severe features    HPI:   Jacqueline Ambriz is a 34 y.o.  at 31w2d who was sent from her prenatal appointment to OB triage today  after she was found to have severe range blood pressures (163/93 with a 15-minute repeat of 173/105). Patient reports headache and increased swelling. Denies vision changes, chest pain, increased shortness of breath, or RUQ pain. Reports good fetal movement. Denies VB, LOF, contractions.     Pregnancy has been notable for:  - AMA: rr cfDNA   - Vaginal bleeding in 3rd trimester: thought to be secondary to a nabothian cyst on cervix  - History of  section in  w/ 2 successful VBACs (, )  - History of ectopic pregnancy, s/p left salpingectomy in 2023      Obstetrical History   OB History          7    Para   3    Term   2       1    AB   3    Living   3         SAB   1    IAB   0    Ectopic   2    Multiple   0    Live Births   3                 Past Medical History  Past Medical History:   Diagnosis Date    History of ectopic pregnancy 2023        Past Surgical History   Past Surgical History:   Procedure Laterality Date     SECTION, LOW TRANSVERSE      SALPINGECTOMY Left 2023       Social History  Social History     Socioeconomic History    Marital status: Single     Spouse name: Jovanni CUI    Number of children: Not on file    Years of education: Not on file    Highest education level: Not on file   Occupational History    Not on file   Tobacco Use    Smoking status: Never    Smokeless tobacco: Never   Vaping Use    Vaping status: Never Used   Substance and Sexual Activity    Alcohol use: Not Currently    Drug use: Never    Sexual activity: Yes   Other Topics Concern    Not on file   Social History Narrative    Not on file     Social Determinants of Health     Financial Resource Strain: Low Risk  (2024)    Overall Financial Resource Strain (CARDIA)     Difficulty of  "Paying Living Expenses: Not very hard   Food Insecurity: No Food Insecurity (7/20/2024)    Hunger Vital Sign     Worried About Running Out of Food in the Last Year: Never true     Ran Out of Food in the Last Year: Never true   Transportation Needs: No Transportation Needs (7/29/2024)    PRAPARE - Transportation     Lack of Transportation (Medical): No     Lack of Transportation (Non-Medical): No   Physical Activity: Not on file   Stress: Not on file   Social Connections: Not on file   Intimate Partner Violence: Not At Risk (7/20/2024)    Humiliation, Afraid, Rape, and Kick questionnaire     Fear of Current or Ex-Partner: No     Emotionally Abused: No     Physically Abused: No     Sexually Abused: No       Allergies  No Known Allergies    Medications  Medications Prior to Admission   Medication Sig Dispense Refill Last Dose    metroNIDAZOLE (Flagyl) 500 mg tablet Take 1 tablet (500 mg) by mouth 2 times a day for 7 days. 14 tablet 0 7/28/2024    acetaminophen (Tylenol) 500 mg tablet Take 1 tablet (500 mg) by mouth every 6 hours if needed for mild pain (1 - 3) or headaches.   Unknown    aspirin 81 mg chewable tablet Chew 1 tablet (81 mg) once daily. 30 tablet 11 Unknown    ferrous gluconate 324 (38 Fe) mg tablet Take 1 tablet (38 mg of iron) by mouth once daily with breakfast. 30 tablet 0 Unknown    prenatal vitamin, iron-folic, 27 mg iron-800 mcg folic acid tablet Take 1 tablet by mouth once daily. 90 tablet 3 Unknown       OBJECTIVE:   BP (!) 153/83   Pulse 80   Temp 36.4 °C (97.5 °F) (Temporal)   Resp 16   Ht 1.549 m (5' 1\")   Wt 87.8 kg (193 lb 9 oz)   LMP 12/23/2023   SpO2 (!) 95%   BMI 36.57 kg/m²    Temp  Min: 36.4 °C (97.5 °F)  Max: 36.4 °C (97.5 °F)  Pulse  Min: 61  Max: 112  BP  Min: 152/82  Max: 174/110    Physical exam:  General:  AAOx3, No acute distress  Cardiovascular: Warm and well perfused  Respiratory: Normal respiratory effort   Abdominal:  Soft, gravid, non-tender, no rebound or " "guarding  Extremities: Warm, well perfused, trace LE edema, no calf tenderness   Pelvic: Deferred  NST: Baseline 125, accelerations present, no decelerations, moderate variability   [employer]: initially irregular contractions, but now acontractile    Labs:   Lab Results   Component Value Date    WBC 6.2 2024    HGB 11.5 (L) 2024    HCT 35.5 (L) 2024     2024     No results found for: \"GRPBSTREP\"  Lab Results   Component Value Date    GLUCOSE 77 2024     2024    K 4.2 2024     (H) 2024    CO2 20 (L) 2024    ANIONGAP 11 2024    BUN 11 2024    CREATININE 0.79 2024    EGFR >90 2024    CALCIUM 8.7 2024    ALBUMIN 3.0 (L) 2024    PROT 6.0 (L) 2024    ALKPHOS 114 (H) 2024    ALT 22 2024    AST 26 2024    BILITOT 0.2 2024     0   Lab Value Date/Time    UTPCR 0.45 (H) 2024 1036     Lab Results   Component Value Date    APTT 28 2024    PROTIME 10.1 2024    INR 0.9 2024     Lab Results   Component Value Date    FIBRINOGEN 502 (H) 2024       ASSESSMENT AND PLAN:     34 y.o.  at 31w2d by LMP with preeclampsia with severe features by blood pressure criteria.     Preeclampsia with severe features:  sPEC diagnosed by severe range BP requiring treatment with IV labetalol 20mg, IV hydralazine 5mg, IV hydralazine 10mg. Started on PO nifedipine 60mg. Also started on Mg, given BMZ. HELLP labs normal (ALT 22, AST 26, Plts 185, Hgb 11.5), P:C 0.45   Cont Mg: S/p 6g Mg bolus on ; Mg 2g/hour infusion started  @ 1145  Recommend 2nd set HELLP labs   Will treat headache and reevaluate: s/p tylenol, reglan, benadryl.   BP goal < 160/110, cont nifedipine PO 60mg daily but uptitrate if needed  Recommend delivery at 34 weeks or sooner if change in maternal/fetal status    H/o third trimester bleeding  Presented to triage for VB last week, thought to be secondary to " nabothian cyst. Denies VB at this time. Coagulation studies on admission wnl: PT 10.1, INR 0.9, aPTT 28. Fibrinogen 509, unchanged from prior. Therefore stable at this time.  Cont to monitor    Fetal status:   - Reassuring, NST reactive   - Continue daily NSTs   - Last ultrasound 7/25/24 : EFW 1512g (21%ile), cephalic    - BMZx2 doses ordered, s/p one dose on 7/29/24   - NICU consult ordered    Routine:  - GBS collected 7/29/24, pending  - TDAP completed 7/15/24  - Flu: n/a  - 1hr normal (118)  - BCM: Nexplanon     Dispo: Continue to monitor inpatient until delivery at 34wks or sooner if indicated    Pt seen and discussed with MFM Attending, Dr. Soto.     CARLOS FLOYD, MS-4  Medical Student  MFM Team    Agree with plan above, and edits made in text as needed.  Treva Castano MD PGY-3

## 2024-07-29 NOTE — CONSULTS
"NICU PRENATAL CONSULT NOTE  Jacqueline Ambriz is a 34 y.o.  at 31w2d. ANAMARIA: 2024, by Last Menstrual Period c/w 7.3 week US. Estimated fetal weight: 1654 g extrapolated from  US. She has had prenatal care with Dr. Bearden .    Chief Complaint: Hypertension    Jacqueline Ambriz is a 34 y.o.  with ANAMARIA 2024, by Last Menstrual Period presenting with new diagnosis of PE with severe features, no other complications.    Requesting Physician/Service:  OBGYN  Consulting Physician/Service: NICU    Reason for Consultation: Possible  delivery at 31 weeks    Pregnancy Complications: new diagnosis of PE.    Prenatal labs:   Lab Results   Component Value Date    ABO B 2024    LABRH POS 2024    ABSCRN NEG 2024    RUBIG Equivocal 03/15/2024     Toxicology:   No results found for: \"AMPHETAMINE\", \"MAMPHBLDS\", \"BARBITURATE\", \"BARBSCRNUR\", \"BENZODIAZ\", \"BENZO\", \"BUPRENBLDS\", \"CANNABBLDS\", \"CANNABINOID\", \"COCBLDS\", \"COCAI\", \"METHABLDS\", \"METH\", \"OXYBLDS\", \"OXYCODONE\", \"PCPBLDS\", \"PCP\", \"OPIATBLDS\", \"OPIATE\", \"FENTANYL\", \"DRBLDCOMM\"  Labs:  Lab Results   Component Value Date    HIV1X2 Nonreactive 03/15/2024    HEPBSAG Nonreactive 03/15/2024    HEPCAB Nonreactive 03/15/2024    NEISSGONOAMP Negative 03/15/2024    CHLAMTRACAMP Negative 03/15/2024    SYPHT Nonreactive 2024     Fetal Imaging:  === Results for orders placed during the hospital encounter of 24 ===    US OB follow UP transabdominal approach [[id number]] 2024    Status: Normal    Medical History  She has a past medical history of History of ectopic pregnancy (2023).     Family History  Family History   Family history unknown: Yes        Social History  She reports that she has never smoked. She has never used smokeless tobacco. She reports that she does not currently use alcohol. She reports that she does not use drugs.      Assessment/Recommendations:  Resuscitation and ICN care plans and expectations for " hospital course were discussed including:  RDS/BPD/Ventilator, IVH/Developmental outcome, OG feedings/NEC/use of breastmilk , risk of infection, possibly blood transfusion, expected length of stay, apnea/monitoring, and Mom consented for DBM.    Spoke with Mrs Ambriz regarding her diagnosis and possibility of premature delivery/possible NICU admission .  She expressed that having had a previous  baby in the NICU makes her feel a little more reassured and reports feeling well at this moment. She shares her support system to be her . Provided active listening. Will discuss her concerns with the OB team with her consent .      Patient made aware that Neonatology will be present for delivery and that we remain available for any questions/concerns that might arise. Thank you.     Electronically signed by:  Bipin Martinez MD     I spent 40 minutes for the consult with at least half the time being face to face with the patient.

## 2024-07-29 NOTE — CARE PLAN
The patient's goals for the shift include      The clinical goals for the shift include BP will return to normotensive    Over the shift, the patient did not make progress toward the following goals. Barriers to progression include . Recommendations to address these barriers include .

## 2024-07-29 NOTE — H&P
Obstetrical Admission History and Physical     Jacqueline Ambriz is a 34 y.o.  at 31w2d. ANAMARIA: 2024, by Last Menstrual Period c/w 7.3 week US. Estimated fetal weight: 1654 g extrapolated from  US. She has had prenatal care with Dr. Bearden .    Chief Complaint: Hypertension    Assessment/Plan      Diagnosis: Preeclampsia with Severe Features  -Diagnosed based on: severe range blood pressures requiring immediate treatment  -Blood pressure goal <160/110  -Short acting medications received? IV hydralazine, dose given: 5/10,  last at 1114     -Long acting antihypertensive: Nifedipine 30 mg daily  -Preeclampsia labs: Normal, P:C 0.45  -HA 4/10--> tylenol, reglan, benadryl--> follow up response    Plan  -Admit to L&D, needs to be consented  - corticosteroids: indicated  -Magnesium for seizure prophylaxis: indicated  -GBS prophylaxis:  Not at this time- indicated if moving toward delivery  -H/o  and 2 VBACs  -MFM consulted    Recent h/o vaginal bleeding  - Izzy Q5-7 min on toco, denies vaginal bleeding  - Coags, fibrinogen pending    Fetal Status  -NST appropriate for gestational age, , + accels, - decels, category I tracing  -Presentation vertex based on bedside ultrasound  -EFW 1654 g by extrapolation of US on   -BMZ x2 doses ordered  -1hr WNL  -GBS collected and pending  -NICU consult ordered    -Postpartum Contraception Plan: Interested in Nexplanon, undecided    Dispo  - Admit to L&D    Discussed plan and reviewed tracing with Dr. Rodriguez. Handoff to Dr. Sylvester, L&D team for further management.  Elizabeth Stovall, APRN-CNP      Active Problems:    Severe pre-eclampsia in third trimester ([facility])      29w2d Problems (from 24 to present)       Problem Noted Resolved    Third trimester bleeding, antepartum ([facility]) 2024 by Chinyere Rivero MD No    Priority:  Medium      Multigravida of advanced maternal age in second trimester ([facility]) 2024 by  Marjan Bearden MD No    Priority:  Medium      Overview Signed 2024 10:36 AM by Marjan Bearden MD     Rr cf DNA         Supervision of high risk pregnancy, antepartum ([facility]) 2024 by Marjan Bearden MD No    Priority:  Medium      Overview Addendum 2024  2:40 PM by Marjan Bearden MD     [x] Dating: LMP consistent with 7 week ultrasound  [x] Initial BMI: 32  [x] Prenatal Labs: B+ blood type, rubella equivocal, Hgb 12.2 -> 11.2 (second trimester)  [x] Pap: 2023: NILM/neg HPV  [x] Aneuploidy Screening: rr cfDNA, XX   [x] Baby ASA: Not indicated  [x] Anatomy US: normal  [x] 1hr GCT at 24-28wks: normal, 118  [x] Tdap (27-36wks): done  [x] Flu Shot: NA  [] COVID vaccine:   [x] Rhogam (if Rh neg): Not indicated  [] Third trimester growth:   [] GBS at 36 wks:  [x] Breastfeeding: Desires to bf  [x] PPBC: Discussed options, desires nexplanon immediately postpartum   [] 39 weeks discussion of IOL vs. Expectant management:  [x] Mode of delivery: Desires another TOLAC           Pregnancy in first trimester with history of ectopic pregnancy ([facility]) 2/3/2023 by Ita Gorman MD 3/15/2024 by Ita Gorman MD            Daniel Phillips is here complaining of high blood pressure.  Hypertension symptoms: Headache    Good fetal movement. Denies vaginal bleeding., C/O of occasional contractions., Denies leaking of fluid.      HA started last night, rated 4/10. Has not taken any meds. Denies vision changes, SOB, RUQ pain.     Obstetrical History   OB History    Para Term  AB Living   7 3 2 1 3 3   SAB IAB Ectopic Multiple Live Births   1 0 2 0 3      # Outcome Date GA Lbr Tree/2nd Weight Sex Type Anes PTL Lv   7 Current            6 Ectopic 2023           5 Ectopic 2021           4 SAB            3 Term 14 40w0d  3.175 kg M Vag-Spont  N CANDE   2 Term 08/10/10 40w0d  2.722 kg M Vag-Spont  N CANDE   1  08 35w0d  2.722 kg M CS-Unspec  Y CANDE       Complications: Breathing problem in infant       Past Medical History  Past Medical History:   Diagnosis Date    Pregnancy in first trimester with history of ectopic pregnancy ([facility]) 2023        Past Surgical History   Past Surgical History:   Procedure Laterality Date     SECTION, CLASSIC       SECTION, LOW TRANSVERSE         Social History  Social History     Tobacco Use    Smoking status: Never    Smokeless tobacco: Never   Substance Use Topics    Alcohol use: Not Currently     Substance and Sexual Activity   Drug Use Never       Allergies  Patient has no known allergies.     Medications  Medications Prior to Admission   Medication Sig Dispense Refill Last Dose    metroNIDAZOLE (Flagyl) 500 mg tablet Take 1 tablet (500 mg) by mouth 2 times a day for 7 days. 14 tablet 0 2024    acetaminophen (Tylenol) 500 mg tablet Take 1 tablet (500 mg) by mouth every 6 hours if needed for mild pain (1 - 3) or headaches.   Unknown    aspirin 81 mg chewable tablet Chew 1 tablet (81 mg) once daily. 30 tablet 11 Unknown    ferrous gluconate 324 (38 Fe) mg tablet Take 1 tablet (38 mg of iron) by mouth once daily with breakfast. 30 tablet 0 Unknown    prenatal vitamin, iron-folic, 27 mg iron-800 mcg folic acid tablet Take 1 tablet by mouth once daily. 90 tablet 3 Unknown       Objective    Last Vitals  Temp Pulse Resp BP MAP O2 Sat   36.4 °C (97.5 °F) (!) 112 16 (!) 162/83 114 96 %     Physical Examination  FHR is 135 , with Accelerations, and a category I  tracing.    [place] reading:  ctx Q5-7 min  The fetus is in a vertex presentation, determined by ultrasound  General: Examination reveals a well developed, well nourished, female, in no acute distress. She is alert and cooperative.  Lungs: symmetrical, non-labored breathing.  Cardiac: warm, well-perfused.  Abdomen: soft, non-tender, gravid.  Extremities: no redness or tenderness in the calves or thighs.  Neurological: alert, oriented, normal speech,  no focal findings or movement disorder noted.     Non-Stress Test   Baseline Fetal Heart Rate for Non-Stress Test: 135 BPM  Variability in Waveform for Non-Stress Test: Moderate  Accelerations in Non-Stress Test: Yes, greater than/equal to 10 bpm, lasting at least 10 seconds  Decelerations in Non-Stress Test: None  Contractions in Non-Stress Test: Irregular  NST Contraction Frequency: 5-7 min  Acoustic Stimulator for Non-Stress Test: No  Interpretation of Non-Stress Test   Interpretation of Non-Stress Test: Reactive     Lab Review  Admission on 07/29/2024   Component Date Value Ref Range Status    WBC 07/29/2024 6.2  4.4 - 11.3 x10*3/uL Final    nRBC 07/29/2024 0.0  0.0 - 0.0 /100 WBCs Final    RBC 07/29/2024 4.38  4.00 - 5.20 x10*6/uL Final    Hemoglobin 07/29/2024 11.5 (L)  12.0 - 16.0 g/dL Final    Hematocrit 07/29/2024 35.5 (L)  36.0 - 46.0 % Final    MCV 07/29/2024 81  80 - 100 fL Final    MCH 07/29/2024 26.3  26.0 - 34.0 pg Final    MCHC 07/29/2024 32.4  32.0 - 36.0 g/dL Final    RDW 07/29/2024 15.7 (H)  11.5 - 14.5 % Final    Platelets 07/29/2024 185  150 - 450 x10*3/uL Final    Glucose 07/29/2024 77  74 - 99 mg/dL Final    Sodium 07/29/2024 137  136 - 145 mmol/L Final    Potassium 07/29/2024 4.2  3.5 - 5.3 mmol/L Final    Chloride 07/29/2024 110 (H)  98 - 107 mmol/L Final    Bicarbonate 07/29/2024 20 (L)  21 - 32 mmol/L Final    Anion Gap 07/29/2024 11  10 - 20 mmol/L Final    Urea Nitrogen 07/29/2024 11  6 - 23 mg/dL Final    Creatinine 07/29/2024 0.79  0.50 - 1.05 mg/dL Final    eGFR 07/29/2024 >90  >60 mL/min/1.73m*2 Final    Calculations of estimated GFR are performed using the 2021 CKD-EPI Study Refit equation without the race variable for the IDMS-Traceable creatinine methods.  https://jasn.asnjournals.org/content/early/2021/09/22/ASN.4733903810    Calcium 07/29/2024 8.7  8.6 - 10.6 mg/dL Final    Albumin 07/29/2024 3.0 (L)  3.4 - 5.0 g/dL Final    Alkaline Phosphatase 07/29/2024 114 (H)  33 - 110 U/L  Final    Total Protein 07/29/2024 6.0 (L)  6.4 - 8.2 g/dL Final    AST 07/29/2024 26  9 - 39 U/L Final    Bilirubin, Total 07/29/2024 0.2  0.0 - 1.2 mg/dL Final    ALT 07/29/2024 22  7 - 45 U/L Final    Patients treated with Sulfasalazine may generate falsely decreased results for ALT.    Total Protein, Urine Random 07/29/2024 189 (H)  5 - 24 mg/dL Final    Creatinine, Urine Random 07/29/2024 416.3 (H)  20.0 - 320.0 mg/dL Final    T. Protein/Creatinine Ratio 07/29/2024 0.45 (H)  0.00 - 0.17 mg/mg Creat Final    LDH 07/29/2024 189  84 - 246 U/L Final    POC Color, Urine 07/29/2024 Yellow  Straw, Yellow, Light-Yellow In process    POC Appearance, Urine 07/29/2024 Clear  Clear In process    POC Glucose, Urine 07/29/2024 NEGATIVE  NEGATIVE mg/dl In process    POC Bilirubin, Urine 07/29/2024 SMALL (1+) (A)  NEGATIVE In process    POC Ketones, Urine 07/29/2024 TRACE (A)  NEGATIVE mg/dl In process    POC Specific Gravity, Urine 07/29/2024 >=1.030  1.005 - 1.035 In process    POC Blood, Urine 07/29/2024 TRACE-Intact (A)  NEGATIVE In process    POC PH, Urine 07/29/2024 6.0  No Reference Range Established PH In process    POC Protein, Urine 07/29/2024 300 (3+) (A)  NEGATIVE, 30 (1+) mg/dl In process    POC Urobilinogen, Urine 07/29/2024 0.2  0.2, 1.0 EU/DL In process    Poc Nitrite, Urine 07/29/2024 NEGATIVE  NEGATIVE In process    POC Leukocytes, Urine 07/29/2024 NEGATIVE  NEGATIVE In process

## 2024-07-30 ENCOUNTER — APPOINTMENT (OUTPATIENT)
Dept: RADIOLOGY | Facility: HOSPITAL | Age: 34
End: 2024-07-30
Payer: COMMERCIAL

## 2024-07-30 PROCEDURE — 76819 FETAL BIOPHYS PROFIL W/O NST: CPT

## 2024-07-30 PROCEDURE — 2500000004 HC RX 250 GENERAL PHARMACY W/ HCPCS (ALT 636 FOR OP/ED)

## 2024-07-30 PROCEDURE — 2500000001 HC RX 250 WO HCPCS SELF ADMINISTERED DRUGS (ALT 637 FOR MEDICARE OP)

## 2024-07-30 PROCEDURE — 1210000001 HC SEMI-PRIVATE ROOM DAILY

## 2024-07-30 PROCEDURE — 76815 OB US LIMITED FETUS(S): CPT | Performed by: OBSTETRICS & GYNECOLOGY

## 2024-07-30 PROCEDURE — 76815 OB US LIMITED FETUS(S): CPT

## 2024-07-30 PROCEDURE — 99232 SBSQ HOSP IP/OBS MODERATE 35: CPT | Performed by: OBSTETRICS & GYNECOLOGY

## 2024-07-30 PROCEDURE — 59025 FETAL NON-STRESS TEST: CPT | Performed by: OBSTETRICS & GYNECOLOGY

## 2024-07-30 PROCEDURE — 76818 FETAL BIOPHYS PROFILE W/NST: CPT | Performed by: OBSTETRICS & GYNECOLOGY

## 2024-07-30 PROCEDURE — 2500000002 HC RX 250 W HCPCS SELF ADMINISTERED DRUGS (ALT 637 FOR MEDICARE OP, ALT 636 FOR OP/ED)

## 2024-07-30 RX ORDER — FAMOTIDINE 20 MG/1
20 TABLET, FILM COATED ORAL 2 TIMES DAILY
Status: DISCONTINUED | OUTPATIENT
Start: 2024-07-30 | End: 2024-08-04

## 2024-07-30 RX ORDER — ACETAMINOPHEN 500 MG
1 TABLET ORAL DAILY
Qty: 1 EACH | Refills: 0 | Status: SHIPPED | OUTPATIENT
Start: 2024-07-30

## 2024-07-30 RX ORDER — BETAMETHASONE SODIUM PHOSPHATE AND BETAMETHASONE ACETATE 3; 3 MG/ML; MG/ML
12 INJECTION, SUSPENSION INTRA-ARTICULAR; INTRALESIONAL; INTRAMUSCULAR; SOFT TISSUE ONCE
Status: COMPLETED | OUTPATIENT
Start: 2024-07-30 | End: 2024-07-30

## 2024-07-30 RX ORDER — CALCIUM CARBONATE 200(500)MG
500 TABLET,CHEWABLE ORAL DAILY
Status: DISCONTINUED | OUTPATIENT
Start: 2024-07-31 | End: 2024-08-03

## 2024-07-30 RX ORDER — CYCLOBENZAPRINE HCL 10 MG
10 TABLET ORAL ONCE
Status: COMPLETED | OUTPATIENT
Start: 2024-07-30 | End: 2024-07-30

## 2024-07-30 RX ADMIN — CYCLOBENZAPRINE 10 MG: 10 TABLET, FILM COATED ORAL at 00:31

## 2024-07-30 RX ADMIN — BETAMETHASONE ACETATE AND BETAMETHASONE SODIUM PHOSPHATE 12 MG: 3; 3 INJECTION, SUSPENSION INTRA-ARTICULAR; INTRALESIONAL; INTRAMUSCULAR; SOFT TISSUE at 11:11

## 2024-07-30 RX ADMIN — NIFEDIPINE 60 MG: 60 TABLET, EXTENDED RELEASE ORAL at 08:41

## 2024-07-30 RX ADMIN — NIFEDIPINE 60 MG: 60 TABLET, EXTENDED RELEASE ORAL at 20:20

## 2024-07-30 ASSESSMENT — PAIN - FUNCTIONAL ASSESSMENT
PAIN_FUNCTIONAL_ASSESSMENT: 0-10

## 2024-07-30 ASSESSMENT — PAIN SCALES - GENERAL
PAINLEVEL_OUTOF10: 0 - NO PAIN
PAINLEVEL_OUTOF10: 0 - NO PAIN
PAINLEVEL_OUTOF10: 1
PAINLEVEL_OUTOF10: 0 - NO PAIN
PAINLEVEL_OUTOF10: 0 - NO PAIN

## 2024-07-30 ASSESSMENT — PAIN DESCRIPTION - DESCRIPTORS: DESCRIPTORS: HEADACHE

## 2024-07-30 NOTE — SIGNIFICANT EVENT
Patient meets criteria for home monitoring of blood pressure post discharge.  Reason: current preeclampsia with severe features,. Met with patient to assess for availability of home BP monitor.  Patient does not have access to BP monitor at home and declined obtaining BP monitor from [employer]. Prescription sent to patient's pharmacy of choice for BP monitor and patient verbalized responsibility for obtaining her own BP monitor after discharge.. Patient educated on importance of continuing to monitor BP at home, recording BP on home monitoring log and s/sx of when to call her provider.  Pt verbalized understanding the above information.

## 2024-07-30 NOTE — PROGRESS NOTES
"ANTEPARTUM PROGRESS NOTE   7/30/2024, 6:31 AM     SUBJECTIVE:  Pt had an MRI/MRA/MRV brain overnight. Patient has no complaints this morning. States that her headache is gone (0/10 pain). Denies vision changes, CP, SOB, RUQ or abdominal pain. Denies painful contractions, VB, LOF. Reports normal fetal movement.     OBJECTIVE:   /62   Pulse 98   Temp 36.3 °C (97.3 °F) (Temporal)   Resp 18   Ht 1.549 m (5' 1\")   Wt 87.8 kg (193 lb 9 oz)   LMP 12/23/2023   SpO2 97%   BMI 36.57 kg/m²    Temp  Min: 36.2 °C (97.2 °F)  Max: 36.4 °C (97.5 °F)  Pulse  Min: 24  Max: 112  BP  Min: 127/62  Max: 174/110    General:  AAOx3, No acute distress  Cardiovascular: Warm and well perfused  Respiratory: Normal respiratory effort   Abdominal:  Soft, gravid, non-tender  Extremities: Warm, well perfused, trace edema, no calf tenderness   Pelvic: deferred  NST: Baseline 125/ mod variability / no accels/ no decels  Zillah: acontractile     Labs:   Lab Results   Component Value Date    WBC 9.3 07/29/2024    HGB 11.1 (L) 07/29/2024    HCT 33.6 (L) 07/29/2024     07/29/2024     Lab Results   Component Value Date    GLUCOSE 129 (H) 07/29/2024     (L) 07/29/2024    K 4.1 07/29/2024     07/29/2024    CO2 17 (L) 07/29/2024    ANIONGAP 14 07/29/2024    BUN 11 07/29/2024    CREATININE 0.75 07/29/2024    EGFR >90 07/29/2024    CALCIUM 8.5 (L) 07/29/2024    ALBUMIN 3.1 (L) 07/29/2024    PROT 5.9 (L) 07/29/2024    ALKPHOS 115 (H) 07/29/2024    ALT 22 07/29/2024    AST 23 07/29/2024    BILITOT 0.2 07/29/2024     Imaging:  MRI/MRA/MRV(7/29/24)  IMPRESSION:  MRI Brain:  1. No evidence of acute infarct, intracranial mass effect or midline  shift.  2. Chronic lacunar infarct in the right inferior cerebellar  hemisphere.      MRA/Venogram:  1. No evidence of stenosis or large vessel occlusion intracranially.  2. There is diminished flow enhancement within the right sigmoid  sinus with associated smaller right jugular foramen, which " may be a  normal developmental variant. There is also absent flow enhancement  within the right transverse sinus without secondary signs of acute  dural venous sinus thrombosis on brain MRI, which may also be  congenital. Clinical correlation is recommended.      ASSESSMENT AND PLAN:   34 y.o.  at 31w3d by LMP with preeclampsia with severe features by blood pressure criteria.     Preeclampsia with severe features:  - Diagnosed by severe range BPs requiring IV treatment. S/p IV hydral 5/10, labetalol 20. Last at 1215 ()  - Has been normotensive to mild range (last severe range @ 2017 on )  - PO antihypertensive regimen: uptitrated to nifedipine 60mg BID  - S/p Mg 6g bolus; s/p 12 hours of Mg 2g/hr infusion  - S/p 1 dose BMZ on , 2nd dose for today   - HELLP labs x2 reviewed and wnl  - Headache resolved s/p tylenol, reglan, benadryl, flexeril, caffeine, periactin  - MRI brain  w/o evidence of acute infarct, mass effect, or VST  - Recommend delivery at 34 weeks or sooner if change in maternal/fetal status    H/o third trimester bleeding  - Seen in triage for VB last week, thought to be 2/2 nabothian cyst  - Currently asx   - Coags wnl - fibrinogen 509 on      Fetal status:   - NST nonreactive, plan for BPP today   - Continue daily NSTs while inpatient  - Last ultrasound 24 : EFW 1512g (21%ile), cephalic    - BMZ  -   - NICU consult ordered     Routine:  - GBS collected 24, pending  - TDAP completed 7/15/24  - Flu: n/a  - 1hr normal (118)  - BCM: Nexplanon     To be seen and discussed with Dr. Soto.     CARLOS FLOYD, MS-4  Medical Student  [facility] Team

## 2024-07-30 NOTE — SIGNIFICANT EVENT
Check in     33yo  at 31.3wga admitted with sPEC. Returned from MRI, reports headache is 1/10, significantly improved per pt, previously 5/10. Now s/p 12 hrs of Mag. Discussed with pt option to turn off Mg, shower, eat, sleep, etc. Counseled pt to inform team if headache returns. Discussed that given MRI negative, would recommend neuro consult vs moving forward with delivery if headache worsens. BPs currently mild range, uptitrated to nifedipine 60 BID to start this AM. Now 4 hrs s/p last severe BP. Pt stable and amenable to transfer to [facility].     D/w Dr. Partha Meeks MD  PGY3, Obstetrics and Gynecology

## 2024-07-30 NOTE — CARE PLAN
The patient's goals for the shift include To remain headache free..    The clinical goals for the shift include BP <160/110    The patient has remained stable this shift. She has remained HA free with stable VS and assessments. Her BP has been controlled with PO antihypertensives. FHR remains stable. RN will continue to monitor

## 2024-07-31 LAB — GP B STREP GENITAL QL CULT: ABNORMAL

## 2024-07-31 PROCEDURE — 59025 FETAL NON-STRESS TEST: CPT | Performed by: OBSTETRICS & GYNECOLOGY

## 2024-07-31 PROCEDURE — 2500000004 HC RX 250 GENERAL PHARMACY W/ HCPCS (ALT 636 FOR OP/ED): Performed by: STUDENT IN AN ORGANIZED HEALTH CARE EDUCATION/TRAINING PROGRAM

## 2024-07-31 PROCEDURE — 2500000002 HC RX 250 W HCPCS SELF ADMINISTERED DRUGS (ALT 637 FOR MEDICARE OP, ALT 636 FOR OP/ED)

## 2024-07-31 PROCEDURE — 2500000001 HC RX 250 WO HCPCS SELF ADMINISTERED DRUGS (ALT 637 FOR MEDICARE OP)

## 2024-07-31 PROCEDURE — 1100000001 HC PRIVATE ROOM DAILY

## 2024-07-31 PROCEDURE — 99232 SBSQ HOSP IP/OBS MODERATE 35: CPT | Performed by: OBSTETRICS & GYNECOLOGY

## 2024-07-31 PROCEDURE — 99199 UNLISTED SPECIAL SVC PX/RPRT: CPT

## 2024-07-31 RX ORDER — METOCLOPRAMIDE 10 MG/1
10 TABLET ORAL ONCE
Status: COMPLETED | OUTPATIENT
Start: 2024-07-31 | End: 2024-07-31

## 2024-07-31 RX ORDER — DIPHENHYDRAMINE HCL 25 MG
25 CAPSULE ORAL ONCE
Status: COMPLETED | OUTPATIENT
Start: 2024-07-31 | End: 2024-07-31

## 2024-07-31 RX ORDER — PENICILLIN G 3000000 [IU]/50ML
3 INJECTION, SOLUTION INTRAVENOUS EVERY 4 HOURS
Status: DISCONTINUED | OUTPATIENT
Start: 2024-08-01 | End: 2024-08-01

## 2024-07-31 RX ORDER — CALCIUM GLUCONATE 98 MG/ML
1 INJECTION, SOLUTION INTRAVENOUS ONCE AS NEEDED
Status: DISCONTINUED | OUTPATIENT
Start: 2024-07-31 | End: 2024-08-01

## 2024-07-31 RX ORDER — MAGNESIUM SULFATE HEPTAHYDRATE 40 MG/ML
2 INJECTION, SOLUTION INTRAVENOUS CONTINUOUS
Status: DISCONTINUED | OUTPATIENT
Start: 2024-07-31 | End: 2024-08-01

## 2024-07-31 RX ORDER — ACETAMINOPHEN 325 MG/1
975 TABLET ORAL EVERY 6 HOURS PRN
Status: DISCONTINUED | OUTPATIENT
Start: 2024-07-31 | End: 2024-08-01

## 2024-07-31 RX ADMIN — FAMOTIDINE 20 MG: 20 TABLET ORAL at 21:12

## 2024-07-31 RX ADMIN — NIFEDIPINE 60 MG: 60 TABLET, EXTENDED RELEASE ORAL at 09:09

## 2024-07-31 RX ADMIN — NIFEDIPINE 60 MG: 60 TABLET, EXTENDED RELEASE ORAL at 21:12

## 2024-07-31 RX ADMIN — PENICILLIN G POTASSIUM 5 MILLION UNITS: 5000000 INJECTION, POWDER, FOR SOLUTION INTRAMUSCULAR; INTRAVENOUS at 22:10

## 2024-07-31 RX ADMIN — FAMOTIDINE 20 MG: 20 TABLET ORAL at 09:09

## 2024-07-31 RX ADMIN — DIPHENHYDRAMINE HYDROCHLORIDE 25 MG: 25 CAPSULE ORAL at 17:56

## 2024-07-31 RX ADMIN — METOCLOPRAMIDE 10 MG: 10 TABLET ORAL at 17:56

## 2024-07-31 RX ADMIN — FAMOTIDINE 20 MG: 20 TABLET ORAL at 00:16

## 2024-07-31 RX ADMIN — MAGNESIUM SULFATE HEPTAHYDRATE 2 G/HR: 40 INJECTION, SOLUTION INTRAVENOUS at 21:48

## 2024-07-31 RX ADMIN — ACETAMINOPHEN 975 MG: 325 TABLET ORAL at 17:56

## 2024-07-31 RX ADMIN — CALCIUM CARBONATE (ANTACID) CHEW TAB 500 MG 500 MG: 500 CHEW TAB at 09:09

## 2024-07-31 ASSESSMENT — PAIN SCALES - GENERAL
PAINLEVEL_OUTOF10: 0 - NO PAIN
PAINLEVEL_OUTOF10: 5 - MODERATE PAIN
PAINLEVEL_OUTOF10: 0 - NO PAIN

## 2024-07-31 ASSESSMENT — PAIN - FUNCTIONAL ASSESSMENT
PAIN_FUNCTIONAL_ASSESSMENT: 0-10

## 2024-07-31 ASSESSMENT — PAIN DESCRIPTION - DESCRIPTORS: DESCRIPTORS: ACHING

## 2024-07-31 NOTE — PROGRESS NOTES
"ANTEPARTUM PROGRESS NOTE   7/31/2024, 6:16 AM     SUBJECTIVE:  Patient has no complaints this morning. States that her headache is gone (0/10 pain). Denies vision changes, CP, RUQ or abdominal pain. States she had some SOB when trying to lay down after eating last night which resolved with sitting up. Denies SOB this morning. States she has been ambulating. Denies painful contractions, VB, LOF. Reports normal fetal movement.     OBJECTIVE:   /74   Pulse 97   Temp 37.4 °C (99.3 °F) (Temporal)   Resp 18   Ht 1.549 m (5' 1\")   Wt 87.8 kg (193 lb 9 oz)   LMP 12/23/2023   SpO2 98%   BMI 36.57 kg/m²    Temp  Min: 36.4 °C (97.5 °F)  Max: 37.4 °C (99.3 °F)  Pulse  Min: 91  Max: 100  BP  Min: 113/70  Max: 148/87    General:  AAOx3, No acute distress  Cardiovascular: Warm and well perfused   Respiratory: Normal respiratory effort    Abdominal:  Soft, gravid, non-tender  Extremities: Warm, well perfused, trace edema, no calf tenderness   Pelvic: deferred  NST: Baseline 140/ mod variability / no accels/ no decels   [place]: 1 contraction noted, otherwise quiet        Labs:   Lab Results   Component Value Date    WBC 9.3 07/29/2024    HGB 11.1 (L) 07/29/2024    HCT 33.6 (L) 07/29/2024     07/29/2024     Lab Results   Component Value Date    GLUCOSE 129 (H) 07/29/2024     (L) 07/29/2024    K 4.1 07/29/2024     07/29/2024    CO2 17 (L) 07/29/2024    ANIONGAP 14 07/29/2024    BUN 11 07/29/2024    CREATININE 0.75 07/29/2024    EGFR >90 07/29/2024    CALCIUM 8.5 (L) 07/29/2024    ALBUMIN 3.1 (L) 07/29/2024    PROT 5.9 (L) 07/29/2024    ALKPHOS 115 (H) 07/29/2024    ALT 22 07/29/2024    AST 23 07/29/2024    BILITOT 0.2 07/29/2024     Imaging:  MRI/MRA/MRV(7/29/24)  IMPRESSION:  MRI Brain:  1. No evidence of acute infarct, intracranial mass effect or midline  shift.  2. Chronic lacunar infarct in the right inferior cerebellar  hemisphere.      MRA/Venogram:  1. No evidence of stenosis or large vessel " occlusion intracranially.  2. There is diminished flow enhancement within the right sigmoid  sinus with associated smaller right jugular foramen, which may be a  normal developmental variant. There is also absent flow enhancement  within the right transverse sinus without secondary signs of acute  dural venous sinus thrombosis on brain MRI, which may also be  congenital. Clinical correlation is recommended.      ASSESSMENT AND PLAN:   34 y.o.  at 31w4d by LMP with preeclampsia with severe features by blood pressure criteria.     Preeclampsia with severe features:  - Diagnosed by severe range BPs requiring IV treatment. S/p IV hydral 5/10, labetalol 20. Last at 1215 ()  - Has been normotensive to mild range (last severe range @ 2017 on )  - PO antihypertensive regimen: nifedipine 60mg BID  - Discussed that if BPs are severe range again will start a second agent   - S/p Mg 6g bolus; s/p 12 hours of Mg 2g/hr infusion  - HELLP labs x2 reviewed and wnl  - Headache resolved s/p tylenol, reglan, benadryl, flexeril, caffeine, periactin  - MRI brain  w/o evidence of acute infarct, mass effect, or VST  - Recommend delivery at 34 weeks or sooner if change in maternal/fetal status    H/o third trimester bleeding  - Seen in triage for VB last week, thought to be 2/2 nabothian cyst  - Currently asx   - Coags wnl - fibrinogen 509 on      Fetal status:   - Reassuring, reactive NST  - Continue daily NSTs while inpatient  - Last ultrasound 24 : EFW 1512g (21%ile), cephalic    - S/p BMZ x2 ( - )  - S/p NICU consult      Routine:  - GBS collected 24, pending  - TDAP completed 7/15/24  - Flu: n/a  - 1hr normal (118)  - BCM: Nexplanon     Pt to be seen and discussed with Dr. Soto.     CARLOS FLOYD, MS-4  Medical Student  [facility] Team

## 2024-07-31 NOTE — PROGRESS NOTES
Social Work Assessment     Patient: Jacqueline Ambriz, 35yo, , ANAMARIA 24  Address: [facility] [address] [place]  Phone: [phone]    Referral Reason: long-stay, assessment    Prenatal Care: [facility] x 7 to date  Barriers: none     Name: undelivered, plans to name baby girl Brian Cameron after delivery    Other Children:   16yo - Abdirashid  14yo - Davon  10yo - Bryn    FOB: Ms Ambriz reports her boyfriend Noelle Cameron is the FOB of pregnancy.     Household Composition: Ms Ambriz reports she lives with SHIRLEY and her sons in a stable and appropriate home.     Supports: Ms Ambriz reports SHIRLEY is her priamry support and is caring for older children this admission. She states  her mother and sister are also supportive.     IPV/DV or Safety Concerns: Ms Ambriz denies IPV/safety concerns at this time.     Car-Seat: yes - has an appropriate convertible seat but also plans to purchase infant carrier  Safe Sleep Space: yes - pack-and-play  Safe Sleep Education: reviewed    Transportation Concerns: none    School/Work/Income: Ms Ambriz reports she works for [employer] ([employer]) and has been there 8 years. She states she reached out today to see if she is eligible for any paid leave but does not know the policy. She knows she can follow up with SW if documentation needed or other concerns.  Ms Ambriz reports she does receive food stamps. She states she does not get WIC and is unsure if she will apply as she plans to breastfeed. She is interested in obtaining pump and will talk with lactation or insurance regarding this.    Insurance: [employer]    Substance Use History: denies    Mental Health Diagnoses/Concerns: Ms Ambriz denies signs, symptoms, and history of depression. She states her mood is stable at baseline and denies concerns. She appears to bbe coping appropriately with hospitalization.      Assessment: SW met with Ms Ambriz for assessment. She was accepting and engaged. She reports she has needed  items for  after delivery and good support. No concerns noted.     Plan: SW remains available for additional assessment, support, and discharge planning throughout pregnancy and delivery as needed, please message with concerns. Otherwise, clear from SW perspective when medically appropriate.     Signature: ROSA Carrasquillo

## 2024-07-31 NOTE — CARE PLAN
The patient's goals for the shift include Rest    The clinical goals for the shift include BP <160/110    The patient has remained stable this shift. She has rested adequately. The patient has had stable VS and assessments. RN will continue to monitor.

## 2024-08-01 LAB
ABO GROUP (TYPE) IN BLOOD: NORMAL
ALBUMIN SERPL BCP-MCNC: 3.2 G/DL (ref 3.4–5)
ALP SERPL-CCNC: 112 U/L (ref 33–110)
ALT SERPL W P-5'-P-CCNC: 27 U/L (ref 7–45)
ANION GAP SERPL CALC-SCNC: 14 MMOL/L (ref 10–20)
ANTIBODY SCREEN: NORMAL
AST SERPL W P-5'-P-CCNC: 31 U/L (ref 9–39)
BILIRUB SERPL-MCNC: 0.3 MG/DL (ref 0–1.2)
BUN SERPL-MCNC: 21 MG/DL (ref 6–23)
CALCIUM SERPL-MCNC: 7.5 MG/DL (ref 8.6–10.6)
CHLORIDE SERPL-SCNC: 105 MMOL/L (ref 98–107)
CO2 SERPL-SCNC: 18 MMOL/L (ref 21–32)
CREAT SERPL-MCNC: 0.87 MG/DL (ref 0.5–1.05)
EGFRCR SERPLBLD CKD-EPI 2021: 89 ML/MIN/1.73M*2
ERYTHROCYTE [DISTWIDTH] IN BLOOD BY AUTOMATED COUNT: 16.8 % (ref 11.5–14.5)
GLUCOSE SERPL-MCNC: 103 MG/DL (ref 74–99)
HCT VFR BLD AUTO: 35.8 % (ref 36–46)
HGB BLD-MCNC: 11.3 G/DL (ref 12–16)
MCH RBC QN AUTO: 26.2 PG (ref 26–34)
MCHC RBC AUTO-ENTMCNC: 31.6 G/DL (ref 32–36)
MCV RBC AUTO: 83 FL (ref 80–100)
NRBC BLD-RTO: 0.2 /100 WBCS (ref 0–0)
PLATELET # BLD AUTO: 185 X10*3/UL (ref 150–450)
POTASSIUM SERPL-SCNC: 4.4 MMOL/L (ref 3.5–5.3)
PROT SERPL-MCNC: 5.9 G/DL (ref 6.4–8.2)
RBC # BLD AUTO: 4.31 X10*6/UL (ref 4–5.2)
RH FACTOR (ANTIGEN D): NORMAL
SODIUM SERPL-SCNC: 133 MMOL/L (ref 136–145)
WBC # BLD AUTO: 9.7 X10*3/UL (ref 4.4–11.3)

## 2024-08-01 PROCEDURE — 2500000004 HC RX 250 GENERAL PHARMACY W/ HCPCS (ALT 636 FOR OP/ED)

## 2024-08-01 PROCEDURE — 2500000002 HC RX 250 W HCPCS SELF ADMINISTERED DRUGS (ALT 637 FOR MEDICARE OP, ALT 636 FOR OP/ED)

## 2024-08-01 PROCEDURE — 99199 UNLISTED SPECIAL SVC PX/RPRT: CPT

## 2024-08-01 PROCEDURE — 2500000001 HC RX 250 WO HCPCS SELF ADMINISTERED DRUGS (ALT 637 FOR MEDICARE OP)

## 2024-08-01 PROCEDURE — 36415 COLL VENOUS BLD VENIPUNCTURE: CPT

## 2024-08-01 PROCEDURE — 85027 COMPLETE CBC AUTOMATED: CPT

## 2024-08-01 PROCEDURE — 1210000001 HC SEMI-PRIVATE ROOM DAILY

## 2024-08-01 PROCEDURE — 59025 FETAL NON-STRESS TEST: CPT | Performed by: OBSTETRICS & GYNECOLOGY

## 2024-08-01 PROCEDURE — 84075 ASSAY ALKALINE PHOSPHATASE: CPT

## 2024-08-01 PROCEDURE — 86923 COMPATIBILITY TEST ELECTRIC: CPT

## 2024-08-01 PROCEDURE — 86901 BLOOD TYPING SEROLOGIC RH(D): CPT

## 2024-08-01 PROCEDURE — 2500000004 HC RX 250 GENERAL PHARMACY W/ HCPCS (ALT 636 FOR OP/ED): Performed by: STUDENT IN AN ORGANIZED HEALTH CARE EDUCATION/TRAINING PROGRAM

## 2024-08-01 PROCEDURE — 2500000001 HC RX 250 WO HCPCS SELF ADMINISTERED DRUGS (ALT 637 FOR MEDICARE OP): Performed by: STUDENT IN AN ORGANIZED HEALTH CARE EDUCATION/TRAINING PROGRAM

## 2024-08-01 PROCEDURE — 99232 SBSQ HOSP IP/OBS MODERATE 35: CPT | Performed by: OBSTETRICS & GYNECOLOGY

## 2024-08-01 RX ORDER — DOCUSATE SODIUM 100 MG/1
100 CAPSULE, LIQUID FILLED ORAL 2 TIMES DAILY PRN
Status: DISCONTINUED | OUTPATIENT
Start: 2024-08-01 | End: 2024-08-02

## 2024-08-01 RX ORDER — ADHESIVE BANDAGE
10 BANDAGE TOPICAL
Status: DISCONTINUED | OUTPATIENT
Start: 2024-08-01 | End: 2024-08-02

## 2024-08-01 RX ORDER — SIMETHICONE 80 MG
80 TABLET,CHEWABLE ORAL 4 TIMES DAILY PRN
Status: DISCONTINUED | OUTPATIENT
Start: 2024-08-01 | End: 2024-08-02

## 2024-08-01 RX ORDER — BISACODYL 10 MG/1
10 SUPPOSITORY RECTAL DAILY PRN
Status: DISCONTINUED | OUTPATIENT
Start: 2024-08-01 | End: 2024-08-02

## 2024-08-01 RX ORDER — METOCLOPRAMIDE 10 MG/1
10 TABLET ORAL EVERY 6 HOURS PRN
Status: DISCONTINUED | OUTPATIENT
Start: 2024-08-01 | End: 2024-08-02

## 2024-08-01 RX ORDER — HYDRALAZINE HYDROCHLORIDE 20 MG/ML
5 INJECTION INTRAMUSCULAR; INTRAVENOUS ONCE AS NEEDED
Status: DISCONTINUED | OUTPATIENT
Start: 2024-08-01 | End: 2024-08-02

## 2024-08-01 RX ORDER — ONDANSETRON 4 MG/1
4 TABLET, FILM COATED ORAL EVERY 6 HOURS PRN
Status: DISCONTINUED | OUTPATIENT
Start: 2024-08-01 | End: 2024-08-02

## 2024-08-01 RX ORDER — NIFEDIPINE 10 MG/1
10 CAPSULE ORAL ONCE AS NEEDED
Status: DISCONTINUED | OUTPATIENT
Start: 2024-08-01 | End: 2024-08-02

## 2024-08-01 RX ORDER — SODIUM CHLORIDE, SODIUM LACTATE, POTASSIUM CHLORIDE, CALCIUM CHLORIDE 600; 310; 30; 20 MG/100ML; MG/100ML; MG/100ML; MG/100ML
125 INJECTION, SOLUTION INTRAVENOUS CONTINUOUS
Status: DISCONTINUED | OUTPATIENT
Start: 2024-08-01 | End: 2024-08-01

## 2024-08-01 RX ORDER — LABETALOL HYDROCHLORIDE 5 MG/ML
INJECTION, SOLUTION INTRAVENOUS
Status: COMPLETED
Start: 2024-08-01 | End: 2024-08-01

## 2024-08-01 RX ORDER — AMOXICILLIN 250 MG/1
500 CAPSULE ORAL EVERY 8 HOURS
Status: DISCONTINUED | OUTPATIENT
Start: 2024-08-03 | End: 2024-08-02

## 2024-08-01 RX ORDER — METOCLOPRAMIDE HYDROCHLORIDE 5 MG/ML
10 INJECTION INTRAMUSCULAR; INTRAVENOUS EVERY 6 HOURS PRN
Status: DISCONTINUED | OUTPATIENT
Start: 2024-08-01 | End: 2024-08-02

## 2024-08-01 RX ORDER — LIDOCAINE HYDROCHLORIDE 10 MG/ML
0.5 INJECTION INFILTRATION; PERINEURAL ONCE AS NEEDED
Status: DISCONTINUED | OUTPATIENT
Start: 2024-08-01 | End: 2024-08-02

## 2024-08-01 RX ORDER — AZITHROMYCIN 500 MG/1
1000 TABLET, FILM COATED ORAL ONCE
Status: COMPLETED | OUTPATIENT
Start: 2024-08-01 | End: 2024-08-01

## 2024-08-01 RX ORDER — LABETALOL 100 MG/1
200 TABLET, FILM COATED ORAL 2 TIMES DAILY
Status: DISCONTINUED | OUTPATIENT
Start: 2024-08-01 | End: 2024-08-01

## 2024-08-01 RX ORDER — POLYETHYLENE GLYCOL 3350 17 G/17G
17 POWDER, FOR SOLUTION ORAL 2 TIMES DAILY PRN
Status: DISCONTINUED | OUTPATIENT
Start: 2024-08-01 | End: 2024-08-02

## 2024-08-01 RX ORDER — LABETALOL HYDROCHLORIDE 5 MG/ML
20 INJECTION, SOLUTION INTRAVENOUS ONCE AS NEEDED
Status: DISCONTINUED | OUTPATIENT
Start: 2024-08-01 | End: 2024-08-02

## 2024-08-01 RX ORDER — ONDANSETRON HYDROCHLORIDE 2 MG/ML
4 INJECTION, SOLUTION INTRAVENOUS EVERY 6 HOURS PRN
Status: DISCONTINUED | OUTPATIENT
Start: 2024-08-01 | End: 2024-08-02

## 2024-08-01 RX ORDER — SODIUM CHLORIDE 9 MG/ML
25 INJECTION, SOLUTION INTRAVENOUS CONTINUOUS
Status: DISCONTINUED | OUTPATIENT
Start: 2024-08-01 | End: 2024-08-02

## 2024-08-01 RX ORDER — LABETALOL HYDROCHLORIDE 5 MG/ML
20 INJECTION, SOLUTION INTRAVENOUS ONCE
Status: COMPLETED | OUTPATIENT
Start: 2024-08-01 | End: 2024-08-01

## 2024-08-01 RX ORDER — LABETALOL 100 MG/1
200 TABLET, FILM COATED ORAL 2 TIMES DAILY
Status: DISCONTINUED | OUTPATIENT
Start: 2024-08-01 | End: 2024-08-02

## 2024-08-01 RX ADMIN — PENICILLIN G 3 MILLION UNITS: 3000000 INJECTION, SOLUTION INTRAVENOUS at 02:23

## 2024-08-01 RX ADMIN — AMPICILLIN SODIUM 2 G: 2 INJECTION, POWDER, FOR SOLUTION INTRAMUSCULAR; INTRAVENOUS at 12:18

## 2024-08-01 RX ADMIN — LABETALOL HYDROCHLORIDE 200 MG: 100 TABLET, FILM COATED ORAL at 21:07

## 2024-08-01 RX ADMIN — FAMOTIDINE 20 MG: 20 TABLET ORAL at 21:00

## 2024-08-01 RX ADMIN — ONDANSETRON 4 MG: 2 INJECTION INTRAMUSCULAR; INTRAVENOUS at 11:03

## 2024-08-01 RX ADMIN — SIMETHICONE 80 MG: 80 TABLET, CHEWABLE ORAL at 21:50

## 2024-08-01 RX ADMIN — LABETALOL HYDROCHLORIDE 20 MG: 5 INJECTION, SOLUTION INTRAVENOUS at 06:55

## 2024-08-01 RX ADMIN — LABETALOL HYDROCHLORIDE 200 MG: 100 TABLET, FILM COATED ORAL at 07:00

## 2024-08-01 RX ADMIN — NIFEDIPINE 60 MG: 60 TABLET, EXTENDED RELEASE ORAL at 06:54

## 2024-08-01 RX ADMIN — SODIUM CHLORIDE 25 ML/HR: 9 INJECTION, SOLUTION INTRAVENOUS at 12:19

## 2024-08-01 RX ADMIN — PENICILLIN G 3 MILLION UNITS: 3000000 INJECTION, SOLUTION INTRAVENOUS at 07:13

## 2024-08-01 RX ADMIN — SIMETHICONE 80 MG: 80 TABLET, CHEWABLE ORAL at 13:57

## 2024-08-01 RX ADMIN — SODIUM CHLORIDE, POTASSIUM CHLORIDE, SODIUM LACTATE AND CALCIUM CHLORIDE 50 ML/HR: 600; 310; 30; 20 INJECTION, SOLUTION INTRAVENOUS at 05:16

## 2024-08-01 RX ADMIN — POLYETHYLENE GLYCOL 3350 17 G: 17 POWDER, FOR SOLUTION ORAL at 15:37

## 2024-08-01 RX ADMIN — NIFEDIPINE 60 MG: 60 TABLET, EXTENDED RELEASE ORAL at 21:07

## 2024-08-01 RX ADMIN — AMPICILLIN SODIUM 2 G: 2 INJECTION, POWDER, FOR SOLUTION INTRAMUSCULAR; INTRAVENOUS at 18:01

## 2024-08-01 RX ADMIN — SODIUM CHLORIDE 25 ML/HR: 9 INJECTION, SOLUTION INTRAVENOUS at 18:02

## 2024-08-01 RX ADMIN — AZITHROMYCIN DIHYDRATE 1000 MG: 500 TABLET ORAL at 12:19

## 2024-08-01 RX ADMIN — PRENATAL VIT W/ FE FUMARATE-FA TAB 27-0.8 MG 1 TABLET: 27-0.8 TAB at 12:19

## 2024-08-01 RX ADMIN — MAGNESIUM SULFATE HEPTAHYDRATE 2 G/HR: 40 INJECTION, SOLUTION INTRAVENOUS at 04:54

## 2024-08-01 ASSESSMENT — PAIN SCALES - GENERAL
PAINLEVEL_OUTOF10: 0 - NO PAIN
PAINLEVEL_OUTOF10: 6
PAINLEVEL_OUTOF10: 0 - NO PAIN

## 2024-08-01 ASSESSMENT — PAIN DESCRIPTION - DESCRIPTORS: DESCRIPTORS: DISCOMFORT

## 2024-08-01 ASSESSMENT — PAIN - FUNCTIONAL ASSESSMENT
PAIN_FUNCTIONAL_ASSESSMENT: 0-10

## 2024-08-01 NOTE — CARE PLAN
Problem: Antepartum  Goal: Maintain pregnancy as long as maternal and/or fetal condition is stable  Outcome: Progressing     Problem: Hypertensive Disorder of Pregnancy (HDP)  Goal: Minimal s/sx of HDP and BP<160/110  Outcome: Progressing     Problem:  Labor/Prolonged Premature Rupture of Membranes  Goal: No s/sx of IAI  Outcome: Progressing     Problem: Safety - Adult  Goal: Free from fall injury  Outcome: Progressing     Pt was stable this shift. Her VS were stable. She had no s/sx of HDP this shift. She had a reactive NST this shift

## 2024-08-01 NOTE — SIGNIFICANT EVENT
"Significant Event    House officer called to bedside for patient complaint of abdominal discomfort. States it does not feel like contractions, but has \"fullness\". States it mildly feels like gas pain or GERD. Denies changes in discharge from baseline, VB. Feels like baby is moving slightly less during the past hour. Has not had bowel movement since admission.    BP (!) 144/91   Pulse 96   Temp 36.8 °C (98.2 °F) (Temporal)   Resp 19   Ht 1.549 m (5' 1\")   Wt 87.8 kg (193 lb 9 oz)   LMP 12/23/2023   SpO2 99%   BMI 36.57 kg/m²      NST: baseline 115, accels, no decels, mod variability  Liz pad reviewed, dark yellow discharge.    A/P  Simethicone PRN encouraged  Will start bowel regimen including miralax and PM colace    PPROM  - Diagnosed on 7/31: + pooling, - nitrazine, + ferning on exam  - SVE 1/long/high on exam 7/31  - S/p 12hrs Mg ppx  - Continue latency abx  - If any evidence of bleeding, will obtain abruption labs    D/w Dr. Soto.    Park Dorman MD   PGY-2, MFM  " Pt. Was given his psa results and testosterone results. He states he has not stopped his testosterone yet. He will do so now. He would like a repeat testosterone prior to surgery order placed. Dr. Cole aware.

## 2024-08-01 NOTE — PROGRESS NOTES
Antepartum Progress Note    Assessment/Plan   Jacqueline Ambriz is a 34 y.o.  at 31w5d. ANAMARIA: 2024, by Last Menstrual Period.     34 y.o.  at 31w5d by LMP c/w 7 wk US with preeclampsia with severe features by blood pressure criteria, now with PPROM     Preeclampsia with severe features:  - Diagnosed by severe range BPs requiring IV treatment. S/p IV hydral /10, labetalol 20. Last at 1215 ()  - Last severe 630 , treated with Labetalol 20  - PO antihypertensive regimen: nifedipine 60mg BID + labetalol 200mg BID  - Per MFM, if max dose on second agent would proceed with delivery  - S/p Mg 6g bolus; s/p 12 hours of Mg 2g/hr infusion  - HELLP labs x2 reviewed and wnl  - Headache resolved s/p tylenol, reglan, benadryl, flexeril, caffeine, periactin  - MRI brain  w/o evidence of acute infarct, mass effect, or VST  - Recommend delivery at 34 weeks or sooner if change in maternal/fetal status    PPROM  - Diagnosed on : + pooling, - nitrazine, + ferning on exam  - SVE 1/long/high on exam   - S/p 12 hours of Mag  - On PCN, plan to transition to latency abx once transferred to antepartum floor  - Recommend delivery at 34 weeks or sooner if change in maternal/fetal status    H/o third trimester bleeding  - Seen in triage for VB last week, thought to be 2/2 nabothian cyst  - Currently asx   - Coags wnl - fibrinogen 509 on      Fetal status:  - CEFM on L&D, FHT cat 1 currently  - Continue daily NSTs while inpatient  - Last ultrasound 24 : EFW 1512g (21%ile), cephalic    - S/p BMZ x2 ( - )  - S/p NICU consult      Routine:  - GBS positive   - TDAP completed 7/15/24  - Flu: n/a  - 1hr normal (118)  - BCM: Nexplanon      Patient stable for transfer to [facility]  Discussed with Dr. Malena Colon MD PGY-3    Principal Problem:    Indication for care in labor and delivery, antepartum ([facility])  Active Problems:    Severe pre-eclampsia in third trimester ([facility])    29w2d  Problems (from 02/05/24 to present)       Problem Noted Resolved    Indication for care in labor and delivery, antepartum ([facility]) 7/30/2024 by Cat Chan RN No    Priority:  Medium      Severe pre-eclampsia in third trimester ([facility]) 7/29/2024 by SCOOTER Rivero-CNP No    Priority:  Medium      Third trimester bleeding, antepartum ([facility]) 7/22/2024 by Chinyere Rivero MD No    Priority:  Medium      Multigravida of advanced maternal age in second trimester ([facility]) 4/14/2024 by Marjan Bearden MD No    Priority:  Medium      Overview Signed 4/14/2024 10:36 AM by Marjan Bearden MD     Rr cf DNA         Supervision of high risk pregnancy, antepartum ([facility]) 4/14/2024 by Marjan Bearden MD No    Priority:  Medium      Overview Addendum 7/22/2024  2:40 PM by Marjan Bearden MD     [x] Dating: LMP consistent with 7 week ultrasound  [x] Initial BMI: 32  [x] Prenatal Labs: B+ blood type, rubella equivocal, Hgb 12.2 -> 11.2 (second trimester)  [x] Pap: 2/2023: NILM/neg HPV  [x] Aneuploidy Screening: rr cfDNA, XX   [x] Baby ASA: Not indicated  [x] Anatomy US: normal  [x] 1hr GCT at 24-28wks: normal, 118  [x] Tdap (27-36wks): done  [x] Flu Shot: NA  [] COVID vaccine:   [x] Rhogam (if Rh neg): Not indicated  [] Third trimester growth:   [] GBS at 36 wks:  [x] Breastfeeding: Desires to bf  [x] PPBC: Discussed options, desires nexplanon immediately postpartum   [] 39 weeks discussion of IOL vs. Expectant management:  [x] Mode of delivery: Desires another TOLAC           Pregnancy in first trimester with history of ectopic pregnancy ([facility]) 2/3/2023 by Ita Gorman MD 3/15/2024 by Ita Gorman MD            Subjective   Patient resting in bed, denies any HA, visual changes, CP, SOB, RUQ pain. Denies any subjective fevers and chills. Good fetal movement.    Objective   Allergies:   Patient has no known allergies.    Last Vitals:  Temp Pulse Resp BP MAP Pulse Ox   36.5 °C  (97.7 °F) 97 16 (!) 140/90 110 97 %     Vitals Min/Max Last 24 Hours:  Temp  Min: 36.3 °C (97.3 °F)  Max: 36.9 °C (98.4 °F)  Pulse  Min: 89  Max: 120  Resp  Min: 15  Max: 18  BP  Min: 130/78  Max: 181/101  MAP (mmHg)  Min: 96  Max: 131    Intake/Output:     Intake/Output Summary (Last 24 hours) at 8/1/2024 1025  Last data filed at 8/1/2024 1010  Gross per 24 hour   Intake 2311.66 ml   Output 1150 ml   Net 1161.66 ml       Physical Exam:  General: no acute distress  HEENT: normocephalic, atraumatic  Heart: warm and well perfused  Lungs: breathing comfortably on room air, CTAB  Abdomen: gravid  Extremities: moving all extremities, 2+ DTR in UE  Neuro: awake and conversant  Psych: appropriate mood and affect    FHT: 115/mod/+accel/-decel  [place]: quiet      Lab Data:  Labs in chart were reviewed.

## 2024-08-01 NOTE — SIGNIFICANT EVENT
PPROM    Called to the bedside because pt felt a gush of fluid after getting out of the shower and has continue to leak fluid. She has also started to feel contractions since the gush of fluid. She denies VB. Good FM.    On exam, + pooling, - nitrazine, + ferning. SVE 1/L/H    Will transfer to [facility] in s/o PPROM. Will proceed with mag and latency Abx. Charge aware.  - Cephalic 7/30  - BMZ 7/29 - 7/30  - GBS pos 7/29    Discussed with Dr. Octavio Gorman MD, PGY-4

## 2024-08-01 NOTE — PROGRESS NOTES
"ANTEPARTUM PROGRESS NOTE   8/1/2024, 6:24 AM     SUBJECTIVE: Patient has no complaints this morning. Patient found to have PPROM overnight. States fluid leak has slowed down. Denies painful contractions or VB. Reports normal fetal movement.   Denies HA, CP, SOB, abdominal pain.     OBJECTIVE:   BP (!) 181/101   Pulse 109   Temp 36.3 °C (97.3 °F) (Temporal)   Resp 16   Ht 1.549 m (5' 1\")   Wt 87.8 kg (193 lb 9 oz)   LMP 12/23/2023   SpO2 100%   BMI 36.57 kg/m²    Temp  Min: 36.3 °C (97.3 °F)  Max: 36.9 °C (98.4 °F)  Pulse  Min: 89  Max: 115  BP  Min: 127/73  Max: 181/101    General:  AAOx3, No acute distress  Cardiovascular: Warm and well perfused  Respiratory: Normal respiratory effort   Abdominal:  Soft, gravid, non-tender, no rebound or guarding, no palpable contractions   Extremities: Warm, well perfused, no edema, no calf tenderness   NST: Baseline 120/ overall mod variability/ pos accels/ no decels  [place]: quiet     Labs:   Lab Results   Component Value Date    WBC 9.7 08/01/2024    HGB 11.3 (L) 08/01/2024    HCT 35.8 (L) 08/01/2024     08/01/2024     Lab Results   Component Value Date    GLUCOSE 103 (H) 08/01/2024     (L) 08/01/2024    K 4.4 08/01/2024     08/01/2024    CO2 18 (L) 08/01/2024    ANIONGAP 14 08/01/2024    BUN 21 08/01/2024    CREATININE 0.87 08/01/2024    EGFR 89 08/01/2024    CALCIUM 7.5 (L) 08/01/2024    ALBUMIN 3.2 (L) 08/01/2024    PROT 5.9 (L) 08/01/2024    ALKPHOS 112 (H) 08/01/2024    ALT 27 08/01/2024    AST 31 08/01/2024    BILITOT 0.3 08/01/2024       Imaging:  MRI/MRA/MRV(7/29/24)  IMPRESSION:  MRI Brain:  1. No evidence of acute infarct, intracranial mass effect or midline  shift.  2. Chronic lacunar infarct in the right inferior cerebellar  hemisphere.      MRA/Venogram:  1. No evidence of stenosis or large vessel occlusion intracranially.  2. There is diminished flow enhancement within the right sigmoid  sinus with associated smaller right jugular foramen, " which may be a  normal developmental variant. There is also absent flow enhancement  within the right transverse sinus without secondary signs of acute  dural venous sinus thrombosis on brain MRI, which may also be  congenital. Clinical correlation is recommended.    ASSESSMENT AND PLAN:     34 y.o.  at 31w5d by LMP with preeclampsia with severe features by blood pressure criteria and now with PPROM.     PPROM:  - Diagnosed on : + pooling, - nitrazine, + ferning on exam  - SVE 1/long/high on exam   - S/p Mg 6g bolus on , on Mg 2g/hr infusion  - On PCN starting , plan to transition to latency abx if stable    Preeclampsia with severe features:  - Diagnosed by severe range BPs on  requiring IV treatment with hydral 5/10 and labetalol 20  - S/p Mg 6g bolus and 12 hours of Mg 2g/hr infusion on . Currently infusing given PPROM as above.  - Was predominantly normotensive to mild range for the past 2 days, but had severe range BPs on  AM requiring IV labetalol 20   - PO antihypertensive regimen: nifedipine 60mg BID. Labetalol 200mg BID added .  - HELLP labs x2 reviewed and wnl; repeat set on  also wnl   - Headache on admission, now resolved s/p tylenol, reglan, benadryl, flexeril, caffeine, periactin  - MRI brain  w/o evidence of acute infarct, mass effect, or VST  - Recommend delivery at 34 weeks or sooner if change in maternal/fetal status     H/o third trimester bleeding  - Seen in triage for VB last week, thought to be 2/2 nabothian cyst  - Currently asx   - Coags wnl - fibrinogen 509 on      Fetal status:   - Reassuring, reactive NST  - Continue daily NSTs while inpatient  - EFW 1512g (21%ile) from US on    - Presentation: cephalic on US from   - S/p BMZ x2 ( - )  - S/p NICU consult      Routine:  - GBS positive, on PCN starting   - TDAP completed 7/15/24  - Flu: n/a  - 1hr normal (118)  - BCM: Nexplanon      Pt to be seen and discussed with REUBEN  attending Dr. Soto.      CARLOS FLOYD, MS-4  Medical Student  M Team

## 2024-08-02 ENCOUNTER — ANESTHESIA EVENT (OUTPATIENT)
Dept: OBSTETRICS AND GYNECOLOGY | Facility: HOSPITAL | Age: 34
End: 2024-08-02
Payer: COMMERCIAL

## 2024-08-02 ENCOUNTER — APPOINTMENT (OUTPATIENT)
Dept: RADIOLOGY | Facility: HOSPITAL | Age: 34
End: 2024-08-02
Payer: COMMERCIAL

## 2024-08-02 ENCOUNTER — ANESTHESIA (OUTPATIENT)
Dept: OBSTETRICS AND GYNECOLOGY | Facility: HOSPITAL | Age: 34
End: 2024-08-02
Payer: COMMERCIAL

## 2024-08-02 PROBLEM — K21.9 GASTROESOPHAGEAL REFLUX DISEASE: Status: ACTIVE | Noted: 2024-08-02

## 2024-08-02 LAB
ALBUMIN SERPL BCP-MCNC: 3 G/DL (ref 3.4–5)
ALBUMIN SERPL BCP-MCNC: 3.2 G/DL (ref 3.4–5)
ALBUMIN SERPL BCP-MCNC: 3.4 G/DL (ref 3.4–5)
ALP SERPL-CCNC: 125 U/L (ref 33–110)
ALP SERPL-CCNC: 131 U/L (ref 33–110)
ALP SERPL-CCNC: 133 U/L (ref 33–110)
ALT SERPL W P-5'-P-CCNC: 31 U/L (ref 7–45)
ALT SERPL W P-5'-P-CCNC: 33 U/L (ref 7–45)
ALT SERPL W P-5'-P-CCNC: 37 U/L (ref 7–45)
ANION GAP SERPL CALC-SCNC: 11 MMOL/L (ref 10–20)
ANION GAP SERPL CALC-SCNC: 14 MMOL/L (ref 10–20)
ANION GAP SERPL CALC-SCNC: 15 MMOL/L (ref 10–20)
AST SERPL W P-5'-P-CCNC: 34 U/L (ref 9–39)
AST SERPL W P-5'-P-CCNC: 38 U/L (ref 9–39)
AST SERPL W P-5'-P-CCNC: 41 U/L (ref 9–39)
BASE EXCESS BLDCOV CALC-SCNC: -4.8 MMOL/L (ref -8.1–-0.5)
BASOPHILS # BLD AUTO: 0.02 X10*3/UL (ref 0–0.1)
BASOPHILS # BLD AUTO: 0.02 X10*3/UL (ref 0–0.1)
BASOPHILS NFR BLD AUTO: 0.2 %
BASOPHILS NFR BLD AUTO: 0.2 %
BILIRUB SERPL-MCNC: 0.5 MG/DL (ref 0–1.2)
BILIRUB SERPL-MCNC: 0.5 MG/DL (ref 0–1.2)
BILIRUB SERPL-MCNC: 0.6 MG/DL (ref 0–1.2)
BODY TEMPERATURE: 37 DEGREES CELSIUS
BUN SERPL-MCNC: 22 MG/DL (ref 6–23)
BUN SERPL-MCNC: 22 MG/DL (ref 6–23)
BUN SERPL-MCNC: 24 MG/DL (ref 6–23)
CALCIUM SERPL-MCNC: 7.1 MG/DL (ref 8.6–10.6)
CALCIUM SERPL-MCNC: 7.3 MG/DL (ref 8.6–10.6)
CALCIUM SERPL-MCNC: 7.9 MG/DL (ref 8.6–10.6)
CHLORIDE SERPL-SCNC: 104 MMOL/L (ref 98–107)
CHLORIDE SERPL-SCNC: 104 MMOL/L (ref 98–107)
CHLORIDE SERPL-SCNC: 105 MMOL/L (ref 98–107)
CO2 SERPL-SCNC: 18 MMOL/L (ref 21–32)
CO2 SERPL-SCNC: 18 MMOL/L (ref 21–32)
CO2 SERPL-SCNC: 21 MMOL/L (ref 21–32)
CREAT SERPL-MCNC: 0.96 MG/DL (ref 0.5–1.05)
CREAT SERPL-MCNC: 0.97 MG/DL (ref 0.5–1.05)
CREAT SERPL-MCNC: 1.02 MG/DL (ref 0.5–1.05)
EGFRCR SERPLBLD CKD-EPI 2021: 74 ML/MIN/1.73M*2
EGFRCR SERPLBLD CKD-EPI 2021: 78 ML/MIN/1.73M*2
EGFRCR SERPLBLD CKD-EPI 2021: 79 ML/MIN/1.73M*2
EOSINOPHIL # BLD AUTO: 0.03 X10*3/UL (ref 0–0.7)
EOSINOPHIL # BLD AUTO: 0.04 X10*3/UL (ref 0–0.7)
EOSINOPHIL NFR BLD AUTO: 0.3 %
EOSINOPHIL NFR BLD AUTO: 0.4 %
ERYTHROCYTE [DISTWIDTH] IN BLOOD BY AUTOMATED COUNT: 16.6 % (ref 11.5–14.5)
ERYTHROCYTE [DISTWIDTH] IN BLOOD BY AUTOMATED COUNT: 16.7 % (ref 11.5–14.5)
GLUCOSE SERPL-MCNC: 85 MG/DL (ref 74–99)
GLUCOSE SERPL-MCNC: 94 MG/DL (ref 74–99)
GLUCOSE SERPL-MCNC: 99 MG/DL (ref 74–99)
HCO3 BLDCOV-SCNC: 21.1 MMOL/L (ref 16–26)
HCT VFR BLD AUTO: 33.8 % (ref 36–46)
HCT VFR BLD AUTO: 36.5 % (ref 36–46)
HGB BLD-MCNC: 11 G/DL (ref 12–16)
HGB BLD-MCNC: 11.9 G/DL (ref 12–16)
IMM GRANULOCYTES # BLD AUTO: 0.15 X10*3/UL (ref 0–0.7)
IMM GRANULOCYTES # BLD AUTO: 0.16 X10*3/UL (ref 0–0.7)
IMM GRANULOCYTES NFR BLD AUTO: 1.5 % (ref 0–0.9)
IMM GRANULOCYTES NFR BLD AUTO: 1.6 % (ref 0–0.9)
INHALED O2 CONCENTRATION: 21 %
LYMPHOCYTES # BLD AUTO: 2.13 X10*3/UL (ref 1.2–4.8)
LYMPHOCYTES # BLD AUTO: 2.33 X10*3/UL (ref 1.2–4.8)
LYMPHOCYTES NFR BLD AUTO: 21.2 %
LYMPHOCYTES NFR BLD AUTO: 23.6 %
MCH RBC QN AUTO: 26 PG (ref 26–34)
MCH RBC QN AUTO: 26.3 PG (ref 26–34)
MCHC RBC AUTO-ENTMCNC: 32.5 G/DL (ref 32–36)
MCHC RBC AUTO-ENTMCNC: 32.6 G/DL (ref 32–36)
MCV RBC AUTO: 80 FL (ref 80–100)
MCV RBC AUTO: 81 FL (ref 80–100)
MONOCYTES # BLD AUTO: 0.7 X10*3/UL (ref 0.1–1)
MONOCYTES # BLD AUTO: 0.72 X10*3/UL (ref 0.1–1)
MONOCYTES NFR BLD AUTO: 7.1 %
MONOCYTES NFR BLD AUTO: 7.2 %
NEUTROPHILS # BLD AUTO: 6.62 X10*3/UL (ref 1.2–7.7)
NEUTROPHILS # BLD AUTO: 6.98 X10*3/UL (ref 1.2–7.7)
NEUTROPHILS NFR BLD AUTO: 67.2 %
NEUTROPHILS NFR BLD AUTO: 69.5 %
NRBC BLD-RTO: 0 /100 WBCS (ref 0–0)
NRBC BLD-RTO: 0 /100 WBCS (ref 0–0)
OXYHGB MFR BLDCOV: 56.6 % (ref 94–98)
PCO2 BLDCOV: 41 MM HG (ref 22–53)
PH BLDCOV: 7.32 PH (ref 7.19–7.47)
PLATELET # BLD AUTO: 131 X10*3/UL (ref 150–450)
PLATELET # BLD AUTO: 170 X10*3/UL (ref 150–450)
PO2 BLDCOV: 27 MM HG (ref 13–37)
POTASSIUM SERPL-SCNC: 4.2 MMOL/L (ref 3.5–5.3)
POTASSIUM SERPL-SCNC: 4.8 MMOL/L (ref 3.5–5.3)
POTASSIUM SERPL-SCNC: 5.1 MMOL/L (ref 3.5–5.3)
PROT SERPL-MCNC: 5.7 G/DL (ref 6.4–8.2)
PROT SERPL-MCNC: 5.9 G/DL (ref 6.4–8.2)
PROT SERPL-MCNC: 6.3 G/DL (ref 6.4–8.2)
RBC # BLD AUTO: 4.19 X10*6/UL (ref 4–5.2)
RBC # BLD AUTO: 4.58 X10*6/UL (ref 4–5.2)
SAO2 % BLDCOV: 58 % (ref 16–84)
SODIUM SERPL-SCNC: 132 MMOL/L (ref 136–145)
WBC # BLD AUTO: 10 X10*3/UL (ref 4.4–11.3)
WBC # BLD AUTO: 9.9 X10*3/UL (ref 4.4–11.3)

## 2024-08-02 PROCEDURE — 2500000004 HC RX 250 GENERAL PHARMACY W/ HCPCS (ALT 636 FOR OP/ED)

## 2024-08-02 PROCEDURE — 7210000002 HC LABOR PER HOUR

## 2024-08-02 PROCEDURE — 51702 INSERT TEMP BLADDER CATH: CPT

## 2024-08-02 PROCEDURE — 85025 COMPLETE CBC W/AUTO DIFF WBC: CPT

## 2024-08-02 PROCEDURE — 59025 FETAL NON-STRESS TEST: CPT

## 2024-08-02 PROCEDURE — 3700000014 EPIDURAL BLOCK: Performed by: ANESTHESIOLOGY

## 2024-08-02 PROCEDURE — 36415 COLL VENOUS BLD VENIPUNCTURE: CPT

## 2024-08-02 PROCEDURE — 71045 X-RAY EXAM CHEST 1 VIEW: CPT | Performed by: STUDENT IN AN ORGANIZED HEALTH CARE EDUCATION/TRAINING PROGRAM

## 2024-08-02 PROCEDURE — 7100000016 HC LABOR RECOVERY PER HOUR: Performed by: STUDENT IN AN ORGANIZED HEALTH CARE EDUCATION/TRAINING PROGRAM

## 2024-08-02 PROCEDURE — 80053 COMPREHEN METABOLIC PANEL: CPT

## 2024-08-02 PROCEDURE — 2720000007 HC OR 272 NO HCPCS: Performed by: STUDENT IN AN ORGANIZED HEALTH CARE EDUCATION/TRAINING PROGRAM

## 2024-08-02 PROCEDURE — 2500000001 HC RX 250 WO HCPCS SELF ADMINISTERED DRUGS (ALT 637 FOR MEDICARE OP)

## 2024-08-02 PROCEDURE — 59514 CESAREAN DELIVERY ONLY: CPT | Performed by: STUDENT IN AN ORGANIZED HEALTH CARE EDUCATION/TRAINING PROGRAM

## 2024-08-02 PROCEDURE — 71045 X-RAY EXAM CHEST 1 VIEW: CPT

## 2024-08-02 PROCEDURE — 3E033VJ INTRODUCTION OF OTHER HORMONE INTO PERIPHERAL VEIN, PERCUTANEOUS APPROACH: ICD-10-PCS

## 2024-08-02 PROCEDURE — 1210000001 HC SEMI-PRIVATE ROOM DAILY

## 2024-08-02 PROCEDURE — 2500000002 HC RX 250 W HCPCS SELF ADMINISTERED DRUGS (ALT 637 FOR MEDICARE OP, ALT 636 FOR OP/ED)

## 2024-08-02 PROCEDURE — 84075 ASSAY ALKALINE PHOSPHATASE: CPT | Performed by: STUDENT IN AN ORGANIZED HEALTH CARE EDUCATION/TRAINING PROGRAM

## 2024-08-02 PROCEDURE — 2500000005 HC RX 250 GENERAL PHARMACY W/O HCPCS

## 2024-08-02 PROCEDURE — 59025 FETAL NON-STRESS TEST: CPT | Mod: GC

## 2024-08-02 PROCEDURE — 82805 BLOOD GASES W/O2 SATURATION: CPT

## 2024-08-02 PROCEDURE — 3700000014 HC AN EPIDURAL BLOCK CHARGE: Performed by: STUDENT IN AN ORGANIZED HEALTH CARE EDUCATION/TRAINING PROGRAM

## 2024-08-02 PROCEDURE — 99199 UNLISTED SPECIAL SVC PX/RPRT: CPT

## 2024-08-02 PROCEDURE — 59050 FETAL MONITOR W/REPORT: CPT

## 2024-08-02 RX ORDER — OXYTOCIN 10 [USP'U]/ML
10 INJECTION, SOLUTION INTRAMUSCULAR; INTRAVENOUS ONCE AS NEEDED
Status: DISCONTINUED | OUTPATIENT
Start: 2024-08-02 | End: 2024-08-03

## 2024-08-02 RX ORDER — KETOROLAC TROMETHAMINE 30 MG/ML
INJECTION, SOLUTION INTRAMUSCULAR; INTRAVENOUS AS NEEDED
Status: DISCONTINUED | OUTPATIENT
Start: 2024-08-02 | End: 2024-08-03

## 2024-08-02 RX ORDER — LABETALOL HYDROCHLORIDE 5 MG/ML
20 INJECTION, SOLUTION INTRAVENOUS ONCE AS NEEDED
Status: COMPLETED | OUTPATIENT
Start: 2024-08-02 | End: 2024-08-02

## 2024-08-02 RX ORDER — ACETAMINOPHEN 325 MG/1
975 TABLET ORAL EVERY 6 HOURS PRN
Status: DISCONTINUED | OUTPATIENT
Start: 2024-08-02 | End: 2024-08-02

## 2024-08-02 RX ORDER — METHYLERGONOVINE MALEATE 0.2 MG/ML
0.2 INJECTION INTRAVENOUS ONCE AS NEEDED
Status: DISCONTINUED | OUTPATIENT
Start: 2024-08-02 | End: 2024-08-03

## 2024-08-02 RX ORDER — MORPHINE SULFATE 0.5 MG/ML
INJECTION, SOLUTION EPIDURAL; INTRATHECAL; INTRAVENOUS AS NEEDED
Status: DISCONTINUED | OUTPATIENT
Start: 2024-08-02 | End: 2024-08-03

## 2024-08-02 RX ORDER — METOCLOPRAMIDE HYDROCHLORIDE 5 MG/ML
10 INJECTION INTRAMUSCULAR; INTRAVENOUS EVERY 6 HOURS PRN
Status: DISCONTINUED | OUTPATIENT
Start: 2024-08-02 | End: 2024-08-04

## 2024-08-02 RX ORDER — FAMOTIDINE 10 MG/ML
INJECTION INTRAVENOUS AS NEEDED
Status: DISCONTINUED | OUTPATIENT
Start: 2024-08-02 | End: 2024-08-03

## 2024-08-02 RX ORDER — METOCLOPRAMIDE 10 MG/1
10 TABLET ORAL EVERY 6 HOURS PRN
Status: DISCONTINUED | OUTPATIENT
Start: 2024-08-02 | End: 2024-08-04

## 2024-08-02 RX ORDER — TERBUTALINE SULFATE 1 MG/ML
0.25 INJECTION SUBCUTANEOUS ONCE AS NEEDED
Status: DISCONTINUED | OUTPATIENT
Start: 2024-08-02 | End: 2024-08-03

## 2024-08-02 RX ORDER — FENTANYL/BUPIVACAINE/NS/PF 2MCG/ML-.1
0-54 PLASTIC BAG, INJECTION (ML) INJECTION CONTINUOUS
Status: DISCONTINUED | OUTPATIENT
Start: 2024-08-02 | End: 2024-08-03

## 2024-08-02 RX ORDER — LABETALOL 100 MG/1
400 TABLET, FILM COATED ORAL 2 TIMES DAILY
Status: DISCONTINUED | OUTPATIENT
Start: 2024-08-02 | End: 2024-08-08 | Stop reason: HOSPADM

## 2024-08-02 RX ORDER — LOPERAMIDE HYDROCHLORIDE 2 MG/1
4 CAPSULE ORAL EVERY 2 HOUR PRN
Status: DISCONTINUED | OUTPATIENT
Start: 2024-08-02 | End: 2024-08-03

## 2024-08-02 RX ORDER — CEFAZOLIN 1 G/1
INJECTION, POWDER, FOR SOLUTION INTRAVENOUS AS NEEDED
Status: DISCONTINUED | OUTPATIENT
Start: 2024-08-02 | End: 2024-08-02

## 2024-08-02 RX ORDER — FENTANYL/BUPIVACAINE/NS/PF 2MCG/ML-.1
PLASTIC BAG, INJECTION (ML) INJECTION AS NEEDED
Status: DISCONTINUED | OUTPATIENT
Start: 2024-08-02 | End: 2024-08-03

## 2024-08-02 RX ORDER — LIDOCAINE HYDROCHLORIDE 10 MG/ML
30 INJECTION INFILTRATION; PERINEURAL ONCE AS NEEDED
Status: DISCONTINUED | OUTPATIENT
Start: 2024-08-02 | End: 2024-08-03

## 2024-08-02 RX ORDER — ACETAMINOPHEN 325 MG/1
975 TABLET ORAL ONCE
Status: COMPLETED | OUTPATIENT
Start: 2024-08-02 | End: 2024-08-02

## 2024-08-02 RX ORDER — TRANEXAMIC ACID 100 MG/ML
1000 INJECTION, SOLUTION INTRAVENOUS ONCE AS NEEDED
Status: DISCONTINUED | OUTPATIENT
Start: 2024-08-02 | End: 2024-08-03

## 2024-08-02 RX ORDER — LABETALOL HYDROCHLORIDE 5 MG/ML
20 INJECTION, SOLUTION INTRAVENOUS ONCE
Status: COMPLETED | OUTPATIENT
Start: 2024-08-02 | End: 2024-08-02

## 2024-08-02 RX ORDER — SODIUM CHLORIDE, SODIUM LACTATE, POTASSIUM CHLORIDE, CALCIUM CHLORIDE 600; 310; 30; 20 MG/100ML; MG/100ML; MG/100ML; MG/100ML
20 INJECTION, SOLUTION INTRAVENOUS CONTINUOUS
Status: DISCONTINUED | OUTPATIENT
Start: 2024-08-02 | End: 2024-08-04

## 2024-08-02 RX ORDER — OXYTOCIN/0.9 % SODIUM CHLORIDE 30/500 ML
2-30 PLASTIC BAG, INJECTION (ML) INTRAVENOUS CONTINUOUS
Status: DISCONTINUED | OUTPATIENT
Start: 2024-08-02 | End: 2024-08-03

## 2024-08-02 RX ORDER — MISOPROSTOL 200 UG/1
800 TABLET ORAL ONCE AS NEEDED
Status: DISCONTINUED | OUTPATIENT
Start: 2024-08-02 | End: 2024-08-03

## 2024-08-02 RX ORDER — FUROSEMIDE 10 MG/ML
20 INJECTION INTRAMUSCULAR; INTRAVENOUS ONCE
Status: COMPLETED | OUTPATIENT
Start: 2024-08-02 | End: 2024-08-02

## 2024-08-02 RX ORDER — CLINDAMYCIN PHOSPHATE 600 MG/50ML
INJECTION, SOLUTION INTRAVENOUS AS NEEDED
Status: DISCONTINUED | OUTPATIENT
Start: 2024-08-02 | End: 2024-08-03

## 2024-08-02 RX ORDER — ONDANSETRON 4 MG/1
4 TABLET, FILM COATED ORAL EVERY 6 HOURS PRN
Status: DISCONTINUED | OUTPATIENT
Start: 2024-08-02 | End: 2024-08-04

## 2024-08-02 RX ORDER — HYDRALAZINE HYDROCHLORIDE 20 MG/ML
5 INJECTION INTRAMUSCULAR; INTRAVENOUS ONCE AS NEEDED
Status: DISCONTINUED | OUTPATIENT
Start: 2024-08-02 | End: 2024-08-03

## 2024-08-02 RX ORDER — OXYTOCIN/0.9 % SODIUM CHLORIDE 30/500 ML
60 PLASTIC BAG, INJECTION (ML) INTRAVENOUS ONCE AS NEEDED
Status: DISCONTINUED | OUTPATIENT
Start: 2024-08-02 | End: 2024-08-03

## 2024-08-02 RX ORDER — LIDOCAINE HCL/EPINEPHRINE/PF 2%-1:200K
VIAL (ML) INJECTION AS NEEDED
Status: DISCONTINUED | OUTPATIENT
Start: 2024-08-02 | End: 2024-08-03

## 2024-08-02 RX ORDER — LABETALOL 100 MG/1
200 TABLET, FILM COATED ORAL ONCE
Status: DISCONTINUED | OUTPATIENT
Start: 2024-08-02 | End: 2024-08-03

## 2024-08-02 RX ORDER — MAGNESIUM SULFATE HEPTAHYDRATE 40 MG/ML
2 INJECTION, SOLUTION INTRAVENOUS CONTINUOUS
Status: DISCONTINUED | OUTPATIENT
Start: 2024-08-02 | End: 2024-08-04

## 2024-08-02 RX ORDER — ONDANSETRON HYDROCHLORIDE 2 MG/ML
4 INJECTION, SOLUTION INTRAVENOUS EVERY 6 HOURS PRN
Status: DISCONTINUED | OUTPATIENT
Start: 2024-08-02 | End: 2024-08-04

## 2024-08-02 RX ORDER — LABETALOL HYDROCHLORIDE 5 MG/ML
INJECTION, SOLUTION INTRAVENOUS
Status: COMPLETED
Start: 2024-08-02 | End: 2024-08-02

## 2024-08-02 RX ORDER — CARBOPROST TROMETHAMINE 250 UG/ML
250 INJECTION, SOLUTION INTRAMUSCULAR ONCE AS NEEDED
Status: DISCONTINUED | OUTPATIENT
Start: 2024-08-02 | End: 2024-08-03

## 2024-08-02 RX ORDER — CALCIUM GLUCONATE 98 MG/ML
1 INJECTION, SOLUTION INTRAVENOUS ONCE AS NEEDED
Status: DISCONTINUED | OUTPATIENT
Start: 2024-08-02 | End: 2024-08-04

## 2024-08-02 RX ORDER — NIFEDIPINE 10 MG/1
10 CAPSULE ORAL ONCE AS NEEDED
Status: DISCONTINUED | OUTPATIENT
Start: 2024-08-02 | End: 2024-08-03

## 2024-08-02 RX ADMIN — Medication 20 MG: at 21:44

## 2024-08-02 RX ADMIN — CALCIUM CARBONATE (ANTACID) CHEW TAB 500 MG 500 MG: 500 CHEW TAB at 09:20

## 2024-08-02 RX ADMIN — POLYETHYLENE GLYCOL 3350 17 G: 17 POWDER, FOR SOLUTION ORAL at 00:18

## 2024-08-02 RX ADMIN — ACETAMINOPHEN 975 MG: 325 TABLET ORAL at 19:21

## 2024-08-02 RX ADMIN — AMPICILLIN SODIUM 2 G: 2 INJECTION, POWDER, FOR SOLUTION INTRAMUSCULAR; INTRAVENOUS at 00:21

## 2024-08-02 RX ADMIN — MAGNESIUM HYDROXIDE 10 ML: 400 SUSPENSION ORAL at 00:18

## 2024-08-02 RX ADMIN — LABETALOL HYDROCHLORIDE 200 MG: 100 TABLET, FILM COATED ORAL at 07:42

## 2024-08-02 RX ADMIN — Medication 2 MILLI-UNITS/MIN: at 14:03

## 2024-08-02 RX ADMIN — NIFEDIPINE 60 MG: 60 TABLET, EXTENDED RELEASE ORAL at 07:42

## 2024-08-02 RX ADMIN — LABETALOL HYDROCHLORIDE 400 MG: 100 TABLET, FILM COATED ORAL at 21:29

## 2024-08-02 RX ADMIN — AMPICILLIN SODIUM 2 G: 2 INJECTION, POWDER, FOR SOLUTION INTRAMUSCULAR; INTRAVENOUS at 06:27

## 2024-08-02 RX ADMIN — FAMOTIDINE 20 MG: 20 TABLET ORAL at 21:29

## 2024-08-02 RX ADMIN — Medication 20 MG: at 20:08

## 2024-08-02 RX ADMIN — AMPICILLIN SODIUM 2 G: 2 INJECTION, POWDER, FOR SOLUTION INTRAMUSCULAR; INTRAVENOUS at 21:35

## 2024-08-02 RX ADMIN — SODIUM PHOSPHATE 1 ENEMA: 7; 19 ENEMA RECTAL at 18:15

## 2024-08-02 RX ADMIN — ONDANSETRON 4 MG: 2 INJECTION INTRAMUSCULAR; INTRAVENOUS at 07:08

## 2024-08-02 RX ADMIN — MAGNESIUM SULFATE HEPTAHYDRATE 2 G/HR: 40 INJECTION, SOLUTION INTRAVENOUS at 19:29

## 2024-08-02 RX ADMIN — DOCUSATE SODIUM 100 MG: 100 CAPSULE, LIQUID FILLED ORAL at 05:14

## 2024-08-02 RX ADMIN — AMPICILLIN SODIUM 2 G: 2 INJECTION, POWDER, FOR SOLUTION INTRAMUSCULAR; INTRAVENOUS at 15:32

## 2024-08-02 RX ADMIN — SODIUM CHLORIDE, POTASSIUM CHLORIDE, SODIUM LACTATE AND CALCIUM CHLORIDE 125 ML/HR: 600; 310; 30; 20 INJECTION, SOLUTION INTRAVENOUS at 11:54

## 2024-08-02 RX ADMIN — FUROSEMIDE 20 MG: 10 INJECTION, SOLUTION INTRAMUSCULAR; INTRAVENOUS at 22:46

## 2024-08-02 RX ADMIN — NIFEDIPINE 60 MG: 60 TABLET, EXTENDED RELEASE ORAL at 21:30

## 2024-08-02 RX ADMIN — LABETALOL HYDROCHLORIDE 20 MG: 5 INJECTION, SOLUTION INTRAVENOUS at 21:44

## 2024-08-02 RX ADMIN — ACETAMINOPHEN 975 MG: 325 TABLET ORAL at 07:41

## 2024-08-02 RX ADMIN — GENTAMICIN SULFATE 400 MG: 40 INJECTION, SOLUTION INTRAMUSCULAR; INTRAVENOUS at 13:52

## 2024-08-02 RX ADMIN — SODIUM CHLORIDE, POTASSIUM CHLORIDE, SODIUM LACTATE AND CALCIUM CHLORIDE 500 ML: 600; 310; 30; 20 INJECTION, SOLUTION INTRAVENOUS at 19:43

## 2024-08-02 RX ADMIN — FAMOTIDINE 20 MG: 20 TABLET ORAL at 09:20

## 2024-08-02 RX ADMIN — SIMETHICONE 80 MG: 80 TABLET, CHEWABLE ORAL at 05:14

## 2024-08-02 ASSESSMENT — PAIN - FUNCTIONAL ASSESSMENT
PAIN_FUNCTIONAL_ASSESSMENT: 0-10

## 2024-08-02 ASSESSMENT — PAIN DESCRIPTION - DESCRIPTORS
DESCRIPTORS: OTHER (COMMENT)
DESCRIPTORS: HEADACHE
DESCRIPTORS: OTHER (COMMENT)
DESCRIPTORS: PRESSURE;TIGHTNESS
DESCRIPTORS: OTHER (COMMENT)

## 2024-08-02 ASSESSMENT — PAIN SCALES - GENERAL
PAINLEVEL_OUTOF10: 5 - MODERATE PAIN
PAINLEVEL_OUTOF10: 5 - MODERATE PAIN
PAINLEVEL_OUTOF10: 4
PAINLEVEL_OUTOF10: 5 - MODERATE PAIN
PAINLEVEL_OUTOF10: 4
PAINLEVEL_OUTOF10: 1
PAINLEVEL_OUTOF10: 4
PAINLEVEL_OUTOF10: 5 - MODERATE PAIN
PAINLEVEL_OUTOF10: 4
PAINLEVEL_OUTOF10: 4

## 2024-08-02 ASSESSMENT — PAIN DESCRIPTION - LOCATION
LOCATION: HEAD
LOCATION: HEAD

## 2024-08-02 NOTE — PROGRESS NOTES
Intrapartum Progress Note    Assessment/Plan   Jacqueline Ambriz is a 34 y.o.  at 31w6d. ANAMARIA: 2024, by Last Menstrual Period.     TOLAC IOL  - Consented, scanned - cephalic  - Last SVE: /-3  - Continue pitocin per protocol  - S/p TOLAC counseling   - Patient raising questions regarding rCS at this time. Discussed there is currently no indication for stopping IOL, however as long as she knows the risks/benefits/alternatives of rCS versus continuning TOLAC IOL, she is free to make whatever decision she feels is best for her. Discussed if maternal or fetal status changes or unsuccessful induction, may recommend rCS at that time as well. Patient expressed understanding and is considering her options.  - Epidural as requested, counseled patient on early vs late epidural in the setting of downtrending Plts below, patient questions answered and amenable.     PPROM, IAI  - Diagnosed on , positive pooling and ferning on exam. Exam last 1/long/high on . S/p Mg gtt for fetal neuroprotection on .   - new onset diffuse abdominal pain and fundal tenderness this AM, afebrile however given clinical symptoms presumed IAI  - Continue Ampicillin/Gentamicin  - s/p BMZ -  - Continue IOL as above    sPEC  Diagnosed by severe range Bps requiring IV treatment, last on . Currently on nifedipine 60 BID, labetalol 200 BID (uptitrated ). Multiple sets of negative HELLP labs. Headache on admission, now resolved. S/p unremarkable MRI brain .   - Continue nifedipine 60mg BID, Labetalol 400 BID (8 PM)  - intermittent CP - EKG NSR  - New epigastric/abdominal pain today, repeat HELLP labs notable for Cr 0.87 --> 1.02. AST 31 --> 41. Per MFM given worsening labs, IOL as above  - Repeat HELLP labs n/f Plts 130 from 170. Cr normalized, LFTs normalized. Repeat set in 4hrs.   - HA this AM, resolved  - Continue Mg at 2g/hr    Maternal wellbeing  - Type & screen, CBC on admission   - Epidural/IV pain meds per  pt request  - Significant constipation, fleet enema requested by pt, ordered  - PPBC: Nexplanon    Fetal wellbeing  - CEFM, Cat 1 currently  - GBS pos, on Amp for IAI which covers GBS   - NICU aware    Seen and discussed with Dr. Cunningham.    Park Dorman MD   PGY-2, Labor and Delivery    Principal Problem:    Indication for care in labor and delivery, antepartum ([facility])  Active Problems:    Severe pre-eclampsia in third trimester ([facility])    Gastroesophageal reflux disease    29w2d Problems (from 02/05/24 to present)       Problem Noted Resolved    Indication for care in labor and delivery, antepartum ([facility]) 7/30/2024 by Cat Chan RN No    Priority:  Medium      Severe pre-eclampsia in third trimester ([facility]) 7/29/2024 by SCOOTER Rivero-CNP No    Priority:  Medium      Third trimester bleeding, antepartum ([facility]) 7/22/2024 by Chinyere Rivero MD No    Priority:  Medium      Multigravida of advanced maternal age in second trimester ([facility]) 4/14/2024 by Marjan Bearden MD No    Priority:  Medium      Overview Signed 4/14/2024 10:36 AM by Marjan Bearden MD     Rr cf DNA         Supervision of high risk pregnancy, antepartum ([facility]) 4/14/2024 by Marjan Bearden MD No    Priority:  Medium      Overview Addendum 7/22/2024  2:40 PM by Marjan Bearden MD     [x] Dating: LMP consistent with 7 week ultrasound  [x] Initial BMI: 32  [x] Prenatal Labs: B+ blood type, rubella equivocal, Hgb 12.2 -> 11.2 (second trimester)  [x] Pap: 2/2023: NILM/neg HPV  [x] Aneuploidy Screening: rr cfDNA, XX   [x] Baby ASA: Not indicated  [x] Anatomy US: normal  [x] 1hr GCT at 24-28wks: normal, 118  [x] Tdap (27-36wks): done  [x] Flu Shot: NA  [] COVID vaccine:   [x] Rhogam (if Rh neg): Not indicated  [] Third trimester growth:   [] GBS at 36 wks:  [x] Breastfeeding: Desires to bf  [x] PPBC: Discussed options, desires nexplanon immediately postpartum   [] 39 weeks discussion of IOL vs.  Expectant management:  [x] Mode of delivery: Desires another TOLAC           Pregnancy in first trimester with history of ectopic pregnancy ([facility]) 2/3/2023 by Ita Gorman MD 3/15/2024 by Ita Gorman MD            Subjective   Seen at bedside in [facility]. Denies HA/Vision changes, CP/SOB, RUQ pain. Amenable for SVE.     Objective   Last Vitals:  Temp Pulse Resp BP MAP Pulse Ox   36.7 °C (98.1 °F) (!) 115 18 (!) 162/95 122 96 %     Vitals Min/Max Last 24 Hours:  Temp  Min: 36.1 °C (97 °F)  Max: 37.3 °C (99.1 °F)  Pulse  Min: 94  Max: 126  Resp  Min: 16  Max: 18  BP  Min: 131/86  Max: 166/91  MAP (mmHg)  Min: 98  Max: 123    Intake/Output:    Intake/Output Summary (Last 24 hours) at 8/2/2024 1957  Last data filed at 8/2/2024 1828  Gross per 24 hour   Intake 1066.5 ml   Output 450 ml   Net 616.5 ml       Physical Examination:  General: Lying in bed in NAD  Obstetric:   FHT: 155 bpm, moderate variability, + accels, - decels  SVE: 1/50/-3  Skin: No rashes/lesions/erythema  Neuro: Awake, alert, conversational  CV: Regular rate, warm and well perfused  Respiratory: Even and unlabored on RA  Extremities: No edema, discoloration, or pain in BLE  Psych: appropriate mood and affect     Lab Review:  Lab Results   Component Value Date    WBC 9.9 08/02/2024    HGB 11.0 (L) 08/02/2024    HCT 33.8 (L) 08/02/2024     (L) 08/02/2024     Lab Results   Component Value Date    GLUCOSE 85 08/02/2024     (L) 08/02/2024    K 5.1 08/02/2024     08/02/2024    CO2 18 (L) 08/02/2024    ANIONGAP 15 08/02/2024    BUN 22 08/02/2024    CREATININE 0.96 08/02/2024    EGFR 79 08/02/2024    CALCIUM 7.3 (L) 08/02/2024    ALBUMIN 3.2 (L) 08/02/2024    PROT 5.9 (L) 08/02/2024    ALKPHOS 125 (H) 08/02/2024    ALT 33 08/02/2024    AST 38 08/02/2024    BILITOT 0.5 08/02/2024

## 2024-08-02 NOTE — INDIVIDUALIZED OVERALL PLAN OF CARE NOTE
34 y.o.  at 31w6d by LMP with preeclampsia with severe features by blood pressure criteria and now with PPROM. Transferred from antepartum service for IOL     TOLAC IOL  - recommend IOL per MFM given presumed IAI and sPEC with worsening lab values and HA  - Cephalic on BSUS  - Will start on pitocin  - Discussed delivery planning, patient desires , discussed risks of  delivery     PPROM, IAI  - Diagnosed on , positive pooling and ferning on exam. Exam last 1/long/high on . S/p Mg gtt for fetal neuroprotection on .   - new onset diffuse abdominal pain and fundal tenderness this AM, afebrile however given clinical symptoms presumed IAI  - started on Ampicillin/Gentamicin  - s/p BMZ -  - plan to start IOL as above     sPEC  Diagnosed by severe range Bps requiring IV treatment, last on . Currently on nifedipine 60 BID, labetalol 200 BID (uptitrated ). Multiple sets of negative HELLP labs. Headache on admission, now resolved. S/p unremarkable MRI brain .   - continue nifedipine 60mg BID, Labetalol 200 BID ()  - intermittent CP - EKG sinus rhythm  - New epigastric/abdominal pain today, repeat HELLP labs notable for Cr 0.87 --> 1.02. AST 31 --> 41. Per MFM given worsening labs, plan for IOL  - Repeat HELLP labs pending  - HA this AM, now resolved, will continue to monitor symptoms  - Mag at 2g/hr     H/o third trimester bleeding  Seen in triage for VB last week, thought to be 2/2 nabothian cyst. Coagulation panel wnl, fibrinogen 509.   - Currently asx      Fetal status:   - CEFM, currently Cat 1  - EFW 1512g (21%ile) from US on    - S/p BMZ  -   - S/p NICU consult during admission on antepartum, NICU updated about plan for IOL  - GBS positive, on Amp/gent for IAI as above     Postpartum:  - PPBC: desires Nexplanon    Seen and discussed with Dr. Humberto Colon MD PGY-3

## 2024-08-02 NOTE — ANESTHESIA PROCEDURE NOTES
Epidural Block    Patient location during procedure: OB  Start time: 8/2/2024 7:50 PM  End time: 8/2/2024 8:22 PM  Reason for block: labor analgesia  Staffing  Performed: resident   Authorized by: Lama Joaquin MD    Performed by: Lama Joaquin MD    Preanesthetic Checklist  Completed: patient identified, IV checked, risks and benefits discussed, surgical consent, pre-op evaluation, timeout performed and sterile techniques followed  Block Timeout  RN/Licensed healthcare professional reads aloud to the Anesthesia provider and entire team: Patient identity, procedure with side and site, patient position, and as applicable the availability of implants/special equipment/special requirements.  Patient on coagulant treatment: no  Timeout performed at: 8/2/2024 7:50 PM  Block Placement  Patient position: sitting  Prep: ChloraPrep  Sterility prep: cap, gloves, hand and mask  Sedation level: no sedation  Patient monitoring: heart rate, continuous pulse oximetry and blood pressure  Approach: midline  Local numbing: lidocaine 1% to skin and subcutaneous tissues  Vertebral space: lumbar  Lumbar location: L3-L4  Epidural  Loss of resistance technique: saline  Guidance: landmark technique        Needle  Needle type: Tuohy   Needle gauge: 18  Needle length: 11.4 cm  Needle insertion depth: 9 cm  Catheter type: multi-orifice  Catheter size: 20 G  Catheter at skin depth: 14 cm  Catheter securement method: clear occlusive dressing, liquid medical adhesive and surgical tape    Test dose: lidocaine 1.5% with epinephrine 1-to-200,000  Test dose: lidocaine 1.5% with epinephrine 1-to-200,000  Test dose result: no positive test dose    PCEA  Medication concentration used: 0.044% Bupivacaine with 1.25 mcg/mL Fentanyl and [id number] Epinephrine  Dose (mL): 10  Lockout (minutes): 15  1-Hour Limit (boluses/hr): 4  Basal Rate: 14        Assessment  Sensory level: T10 bilateral  Block outcome: pain improved  Number of attempts: 1  Events: no  positive test dose  Procedure assessment: patient tolerated procedure well with no immediate complications

## 2024-08-02 NOTE — ANESTHESIA PREPROCEDURE EVALUATION
Patient: Jacqueline Ambriz    Evaluation Method: In-person visit    Procedure Information    Date: 24  Procedure: Labor Consult         Relevant Problems   Cardiac   (+) Severe pre-eclampsia in third trimester ([facility])      GI   (+) Gastroesophageal reflux disease      GYN   (+) Multigravida of advanced maternal age in second trimester ([facility])   (+) Supervision of high risk pregnancy, antepartum ([facility])       Clinical information reviewed:    Allergies  Meds    Surg Hx            NPO Detail:  No data recorded     OB/Gyn Evaluation    Present Pregnancy    Patient is pregnant now.  (+) , previous  section, multiple gestation, hypertensive disorder of pregnancy - preeclampsia   Obstetric History    (+)  section, vaginal birth after               Physical Exam    Airway  Mallampati: IV     Cardiovascular - normal exam     Dental - normal exam     Pulmonary - normal exam     Abdominal - normal exam           Anesthesia Plan    History of general anesthesia?: yes  History of complications of general anesthesia?: no    ASA 2     epidural     Anesthetic plan and risks discussed with patient.  Use of blood products discussed with patient who.    Plan discussed with CAA and attending.

## 2024-08-02 NOTE — ANESTHESIA PREPROCEDURE EVALUATION
Patient: Jacqueline Ambriz    Evaluation Method: In-person visit    Procedure Information    Date: 24  Procedure: Labor Consult         Relevant Problems   Anesthesia (within normal limits)      Cardiac   (+) Severe pre-eclampsia in third trimester ([facility])      Pulmonary (within normal limits)      Neuro (within normal limits)      GI   (+) Gastroesophageal reflux disease      /Renal (within normal limits)      Liver (within normal limits)      Endocrine (within normal limits)      Hematology (within normal limits)      HEENT (within normal limits)      ID (within normal limits)      Skin (within normal limits)      GYN   (+) Multigravida of advanced maternal age in second trimester ([facility])   (+) Supervision of high risk pregnancy, antepartum ([facility])       Clinical information reviewed:    Allergies  Meds    Surg Hx            NPO Detail:  No data recorded     OB/Gyn Evaluation    Present Pregnancy    Patient is pregnant now.  (+) , previous  section, multiple gestation, hypertensive disorder of pregnancy - preeclampsia   Obstetric History    (+)  section, vaginal birth after           Physical Exam    Airway  Mallampati: IV     Cardiovascular - normal exam     Dental - normal exam     Pulmonary - normal exam     Abdominal - normal exam         Anesthesia Plan    History of general anesthesia?: yes  History of complications of general anesthesia?: no    ASA 2     epidural     Anesthetic plan and risks discussed with patient.  Use of blood products discussed with patient who.    Plan discussed with CAA and attending.

## 2024-08-02 NOTE — PROGRESS NOTES
"ANTEPARTUM PROGRESS NOTE   8/2/2024, 7:35 AM     SUBJECTIVE: Patient complaining of significant discomfort and gas pain this morning, reports constipation over last 5 days. Passing less gas but passed gas this AM. Intermittently nauseous, emesis at 0200. Reports intermittent chest pain when lying flat. Otherwise denies abdominal tightening or contractions. Denies VB. Good FM.     OBJECTIVE:   /86   Pulse 110   Temp 37.2 °C (99 °F) (Temporal)   Resp 16   Ht 1.549 m (5' 1\")   Wt 87.8 kg (193 lb 9 oz)   LMP 12/23/2023   SpO2 97%   BMI 36.57 kg/m²    Temp  Min: 36.6 °C (97.9 °F)  Max: 37.3 °C (99.1 °F)  Pulse  Min: 90  Max: 110  BP  Min: 131/86  Max: 152/81    General:  AAOx3, uncomfortable appearing, intermittently nauseous  Cardiovascular: Warm and well perfused  Respiratory: Normal respiratory effort   Abdominal:  Gravid, distended, mildly tender throughout, no rebound or guarding, no palpable contractions   Extremities: Warm, well perfused, no edema, no calf tenderness     NST: Baseline 125/ overall mod variability/ pos accels/ no decels  Phenix: quiet     Labs:   Lab Results   Component Value Date    WBC 9.7 08/01/2024    HGB 11.3 (L) 08/01/2024    HCT 35.8 (L) 08/01/2024     08/01/2024     Lab Results   Component Value Date    GLUCOSE 103 (H) 08/01/2024     (L) 08/01/2024    K 4.4 08/01/2024     08/01/2024    CO2 18 (L) 08/01/2024    ANIONGAP 14 08/01/2024    BUN 21 08/01/2024    CREATININE 0.87 08/01/2024    EGFR 89 08/01/2024    CALCIUM 7.5 (L) 08/01/2024    ALBUMIN 3.2 (L) 08/01/2024    PROT 5.9 (L) 08/01/2024    ALKPHOS 112 (H) 08/01/2024    ALT 27 08/01/2024    AST 31 08/01/2024    BILITOT 0.3 08/01/2024       Imaging:  MRI/MRA/MRV(7/29/24)  IMPRESSION:  MRI Brain:  1. No evidence of acute infarct, intracranial mass effect or midline  shift.  2. Chronic lacunar infarct in the right inferior cerebellar  hemisphere.      MRA/Venogram:  1. No evidence of stenosis or large vessel " occlusion intracranially.  2. There is diminished flow enhancement within the right sigmoid  sinus with associated smaller right jugular foramen, which may be a  normal developmental variant. There is also absent flow enhancement  within the right transverse sinus without secondary signs of acute  dural venous sinus thrombosis on brain MRI, which may also be  congenital. Clinical correlation is recommended.    ASSESSMENT AND PLAN:     34 y.o.  at 31w6d by LMP with preeclampsia with severe features by blood pressure criteria and now with PPROM.     Abdominal pain  New onset this AM, suspect likely 2/2 constipation given has not had BM in 5 days, continuing to pass gas. Bowel regimen started yesterday afternoon. New nausea this morning.   - Recommend suppository/enema this AM, continue to monitor for improvement.   - anti-emetics available PRN   - encouraged ambulation, pt reports minimal ambulation since admission.   - repeat HELLP labs pending     PPROM:  Diagnosed on , positive pooling and ferning on exam. Exam last 1/long/high on . S/p Mg gtt for fetal neuroprotection on . Currently on latency antibiotics   - continue latency antibiotics   - s/p BMZ -    sPEC  Diagnosed by severe range Bps requiring IV treatment, last on . Currently on nifedipine 60 BID, labetalol 200 BID (uptitrated ). Multiple sets of negative HELLP labs. Headache on admission, now resolved. S/p unremarkable MRI brain .   - continue nifedipine 60mg BID, Labetalol 200 BID ()  - intermittent CP - EKG sinus rhythm  - New epigastric/abdominal pain today, repeat HELLP labs notable for Cr 0.87 --> 1.02. AST 31 --> 41. Discussed with pt given worsening symptoms and worsening labs, increased concern for worsening sPEC. Discussed recommendation to proceed with delivery today. Pt amenable.      H/o third trimester bleeding  Seen in triage for VB last week, thought to be 2/2 nabothian cyst. Coagulation panel wnl,  fibrinogen 509.   - Currently asx      Fetal status:   Reassuring, reactive NST  - Continue daily NSTs while inpatient  - EFW 1512g (21%ile) from US on 7/25   - Presentation: cephalic on US from 7/30  - S/p BMZ 7/29 - 7/30  - S/p NICU consult      Routine:  - cephalic 8/1  - GBS positive, for PCN intrapartum  - TDAP completed 7/15/24  - Flu: n/a  - 1hr normal (118)  - BCM: Nexplanon      Dispo: to L&D for IOL, team aware     Pt seen and discussed with MFM attending Dr. Soto.   Rosa Meeks MD  PGY3, Obstetrics and Gynecology

## 2024-08-03 ENCOUNTER — APPOINTMENT (OUTPATIENT)
Dept: RADIOLOGY | Facility: HOSPITAL | Age: 34
End: 2024-08-03
Payer: COMMERCIAL

## 2024-08-03 ENCOUNTER — ANESTHESIA (OUTPATIENT)
Dept: OBSTETRICS AND GYNECOLOGY | Facility: HOSPITAL | Age: 34
End: 2024-08-03
Payer: COMMERCIAL

## 2024-08-03 ENCOUNTER — ANESTHESIA EVENT (OUTPATIENT)
Dept: OBSTETRICS AND GYNECOLOGY | Facility: HOSPITAL | Age: 34
End: 2024-08-03

## 2024-08-03 PROBLEM — S30.1XXA HEMATOMA OF RECTUS SHEATH: Status: ACTIVE | Noted: 2024-07-20

## 2024-08-03 LAB
ALBUMIN SERPL BCP-MCNC: 2.5 G/DL (ref 3.4–5)
ALBUMIN SERPL BCP-MCNC: 2.7 G/DL (ref 3.4–5)
ALBUMIN SERPL BCP-MCNC: 3.2 G/DL (ref 3.4–5)
ALP SERPL-CCNC: 104 U/L (ref 33–110)
ALP SERPL-CCNC: 86 U/L (ref 33–110)
ALP SERPL-CCNC: 86 U/L (ref 33–110)
ALP SERPL-CCNC: 94 U/L (ref 33–110)
ALT SERPL W P-5'-P-CCNC: 23 U/L (ref 7–45)
ALT SERPL W P-5'-P-CCNC: 25 U/L (ref 7–45)
ALT SERPL W P-5'-P-CCNC: 26 U/L (ref 7–45)
ALT SERPL W P-5'-P-CCNC: 26 U/L (ref 7–45)
ANION GAP BLDA CALCULATED.4IONS-SCNC: 12 MMO/L (ref 10–25)
ANION GAP BLDA CALCULATED.4IONS-SCNC: 13 MMO/L (ref 10–25)
ANION GAP BLDA CALCULATED.4IONS-SCNC: 14 MMO/L (ref 10–25)
ANION GAP BLDA CALCULATED.4IONS-SCNC: 15 MMO/L (ref 10–25)
ANION GAP BLDA CALCULATED.4IONS-SCNC: 16 MMO/L (ref 10–25)
ANION GAP BLDA CALCULATED.4IONS-SCNC: ABNORMAL MMOL/L
ANION GAP BLDA CALCULATED.4IONS-SCNC: ABNORMAL MMOL/L
ANION GAP SERPL CALC-SCNC: 13 MMOL/L (ref 10–20)
ANION GAP SERPL CALC-SCNC: 14 MMOL/L (ref 10–20)
ANION GAP SERPL CALC-SCNC: 14 MMOL/L (ref 10–20)
ANION GAP SERPL CALC-SCNC: 17 MMOL/L (ref 10–20)
APTT PPP: 29 SECONDS (ref 27–38)
APTT PPP: 33 SECONDS (ref 27–38)
AST SERPL W P-5'-P-CCNC: 26 U/L (ref 9–39)
AST SERPL W P-5'-P-CCNC: 31 U/L (ref 9–39)
BASE EXCESS BLDA CALC-SCNC: -10.3 MMOL/L (ref -2–3)
BASE EXCESS BLDA CALC-SCNC: -7.6 MMOL/L (ref -2–3)
BASE EXCESS BLDA CALC-SCNC: -8.2 MMOL/L (ref -2–3)
BASE EXCESS BLDA CALC-SCNC: -8.2 MMOL/L (ref -2–3)
BASE EXCESS BLDA CALC-SCNC: -8.7 MMOL/L (ref -2–3)
BASE EXCESS BLDA CALC-SCNC: -8.7 MMOL/L (ref -2–3)
BASE EXCESS BLDA CALC-SCNC: -9.3 MMOL/L (ref -2–3)
BILIRUB DIRECT SERPL-MCNC: 0.1 MG/DL (ref 0–0.3)
BILIRUB SERPL-MCNC: 0.3 MG/DL (ref 0–1.2)
BLOOD EXPIRATION DATE: NORMAL
BODY TEMPERATURE: 37 DEGREES CELSIUS
BUN SERPL-MCNC: 26 MG/DL (ref 6–23)
BUN SERPL-MCNC: 28 MG/DL (ref 6–23)
BUN SERPL-MCNC: 29 MG/DL (ref 6–23)
BUN SERPL-MCNC: 30 MG/DL (ref 6–23)
CA-I BLD-SCNC: 1.04 MMOL/L (ref 1.1–1.33)
CA-I BLDA-SCNC: 0.91 MMOL/L (ref 1.1–1.33)
CA-I BLDA-SCNC: 0.98 MMOL/L (ref 1.1–1.33)
CA-I BLDA-SCNC: 1.05 MMOL/L (ref 1.1–1.33)
CA-I BLDA-SCNC: 1.08 MMOL/L (ref 1.1–1.33)
CA-I BLDA-SCNC: 1.11 MMOL/L (ref 1.1–1.33)
CA-I BLDA-SCNC: 1.11 MMOL/L (ref 1.1–1.33)
CA-I BLDA-SCNC: 1.32 MMOL/L (ref 1.1–1.33)
CALCIUM SERPL-MCNC: 6.3 MG/DL (ref 8.6–10.6)
CALCIUM SERPL-MCNC: 6.3 MG/DL (ref 8.6–10.6)
CALCIUM SERPL-MCNC: 6.4 MG/DL (ref 8.6–10.6)
CALCIUM SERPL-MCNC: 7.9 MG/DL (ref 8.6–10.6)
CHLORIDE BLDA-SCNC: 100 MMOL/L (ref 98–107)
CHLORIDE BLDA-SCNC: 101 MMOL/L (ref 98–107)
CHLORIDE BLDA-SCNC: 101 MMOL/L (ref 98–107)
CHLORIDE BLDA-SCNC: 98 MMOL/L (ref 98–107)
CHLORIDE BLDA-SCNC: 99 MMOL/L (ref 98–107)
CHLORIDE BLDA-SCNC: ABNORMAL MMOL/L
CHLORIDE BLDA-SCNC: ABNORMAL MMOL/L
CHLORIDE SERPL-SCNC: 100 MMOL/L (ref 98–107)
CHLORIDE SERPL-SCNC: 100 MMOL/L (ref 98–107)
CHLORIDE SERPL-SCNC: 101 MMOL/L (ref 98–107)
CHLORIDE SERPL-SCNC: 99 MMOL/L (ref 98–107)
CO2 SERPL-SCNC: 19 MMOL/L (ref 21–32)
CO2 SERPL-SCNC: 20 MMOL/L (ref 21–32)
CO2 SERPL-SCNC: 20 MMOL/L (ref 21–32)
CO2 SERPL-SCNC: 21 MMOL/L (ref 21–32)
CREAT SERPL-MCNC: 1.3 MG/DL (ref 0.5–1.05)
CREAT SERPL-MCNC: 1.42 MG/DL (ref 0.5–1.05)
CREAT SERPL-MCNC: 1.56 MG/DL (ref 0.5–1.05)
CREAT SERPL-MCNC: 1.56 MG/DL (ref 0.5–1.05)
DISPENSE STATUS: NORMAL
EGFRCR SERPLBLD CKD-EPI 2021: 44 ML/MIN/1.73M*2
EGFRCR SERPLBLD CKD-EPI 2021: 44 ML/MIN/1.73M*2
EGFRCR SERPLBLD CKD-EPI 2021: 50 ML/MIN/1.73M*2
EGFRCR SERPLBLD CKD-EPI 2021: 55 ML/MIN/1.73M*2
ERYTHROCYTE [DISTWIDTH] IN BLOOD BY AUTOMATED COUNT: 15.6 % (ref 11.5–14.5)
ERYTHROCYTE [DISTWIDTH] IN BLOOD BY AUTOMATED COUNT: 16.7 % (ref 11.5–14.5)
ERYTHROCYTE [DISTWIDTH] IN BLOOD BY AUTOMATED COUNT: 16.7 % (ref 11.5–14.5)
ERYTHROCYTE [DISTWIDTH] IN BLOOD BY AUTOMATED COUNT: 16.8 % (ref 11.5–14.5)
FIBRINOGEN PPP-MCNC: 397 MG/DL (ref 200–400)
FIBRINOGEN PPP-MCNC: 471 MG/DL (ref 200–400)
GLUCOSE BLDA-MCNC: 107 MG/DL (ref 74–99)
GLUCOSE BLDA-MCNC: 107 MG/DL (ref 74–99)
GLUCOSE BLDA-MCNC: 112 MG/DL (ref 74–99)
GLUCOSE BLDA-MCNC: 127 MG/DL (ref 74–99)
GLUCOSE BLDA-MCNC: 160 MG/DL (ref 74–99)
GLUCOSE BLDA-MCNC: 286 MG/DL (ref 74–99)
GLUCOSE BLDA-MCNC: 87 MG/DL (ref 74–99)
GLUCOSE SERPL-MCNC: 124 MG/DL (ref 74–99)
GLUCOSE SERPL-MCNC: 92 MG/DL (ref 74–99)
GLUCOSE SERPL-MCNC: 93 MG/DL (ref 74–99)
GLUCOSE SERPL-MCNC: 94 MG/DL (ref 74–99)
HCO3 BLDA-SCNC: 16.3 MMOL/L (ref 22–26)
HCO3 BLDA-SCNC: 16.3 MMOL/L (ref 22–26)
HCO3 BLDA-SCNC: 16.5 MMOL/L (ref 22–26)
HCO3 BLDA-SCNC: 16.6 MMOL/L (ref 22–26)
HCO3 BLDA-SCNC: 16.9 MMOL/L (ref 22–26)
HCO3 BLDA-SCNC: 17.1 MMOL/L (ref 22–26)
HCO3 BLDA-SCNC: 18.8 MMOL/L (ref 22–26)
HCT VFR BLD AUTO: 20.8 % (ref 36–46)
HCT VFR BLD AUTO: 22.9 % (ref 36–46)
HCT VFR BLD AUTO: 23.4 % (ref 36–46)
HCT VFR BLD AUTO: 25.9 % (ref 36–46)
HCT VFR BLD EST: 16 % (ref 36–46)
HCT VFR BLD EST: 20 % (ref 36–46)
HCT VFR BLD EST: 22 % (ref 36–46)
HCT VFR BLD EST: 23 % (ref 36–46)
HCT VFR BLD EST: 25 % (ref 36–46)
HCT VFR BLD EST: 27 % (ref 36–46)
HCT VFR BLD EST: 28 % (ref 36–46)
HGB BLD-MCNC: 6.9 G/DL (ref 12–16)
HGB BLD-MCNC: 7.4 G/DL (ref 12–16)
HGB BLD-MCNC: 8.5 G/DL (ref 12–16)
HGB BLD-MCNC: 8.5 G/DL (ref 12–16)
HGB BLDA-MCNC: 5.4 G/DL (ref 12–16)
HGB BLDA-MCNC: 6.8 G/DL (ref 12–16)
HGB BLDA-MCNC: 7.2 G/DL (ref 12–16)
HGB BLDA-MCNC: 7.8 G/DL (ref 12–16)
HGB BLDA-MCNC: 8.2 G/DL (ref 12–16)
HGB BLDA-MCNC: 9 G/DL (ref 12–16)
HGB BLDA-MCNC: 9.3 G/DL (ref 12–16)
INHALED O2 CONCENTRATION: 21 %
INHALED O2 CONCENTRATION: 50 %
INR PPP: 1 (ref 0.9–1.1)
INR PPP: 1 (ref 0.9–1.1)
LACTATE BLDA-SCNC: 1.1 MMOL/L (ref 0.4–2)
LACTATE BLDA-SCNC: 1.6 MMOL/L (ref 0.4–2)
LACTATE BLDA-SCNC: 1.7 MMOL/L (ref 0.4–2)
LACTATE BLDA-SCNC: 1.7 MMOL/L (ref 0.4–2)
LACTATE BLDA-SCNC: 1.8 MMOL/L (ref 0.4–2)
LACTATE BLDA-SCNC: 2 MMOL/L (ref 0.4–2)
LACTATE BLDA-SCNC: 2 MMOL/L (ref 0.4–2)
MAGNESIUM SERPL-MCNC: 6.41 MG/DL (ref 1.6–2.4)
MAGNESIUM SERPL-MCNC: 8.31 MG/DL (ref 1.6–2.4)
MAGNESIUM SERPL-MCNC: 8.92 MG/DL (ref 1.6–2.4)
MCH RBC QN AUTO: 25.9 PG (ref 26–34)
MCH RBC QN AUTO: 26.7 PG (ref 26–34)
MCH RBC QN AUTO: 27.3 PG (ref 26–34)
MCH RBC QN AUTO: 28.3 PG (ref 26–34)
MCHC RBC AUTO-ENTMCNC: 32.3 G/DL (ref 32–36)
MCHC RBC AUTO-ENTMCNC: 32.8 G/DL (ref 32–36)
MCHC RBC AUTO-ENTMCNC: 33.2 G/DL (ref 32–36)
MCHC RBC AUTO-ENTMCNC: 36.3 G/DL (ref 32–36)
MCV RBC AUTO: 78 FL (ref 80–100)
MCV RBC AUTO: 80 FL (ref 80–100)
MCV RBC AUTO: 81 FL (ref 80–100)
MCV RBC AUTO: 82 FL (ref 80–100)
NRBC BLD-RTO: 0 /100 WBCS (ref 0–0)
NRBC BLD-RTO: 0 /100 WBCS (ref 0–0)
NRBC BLD-RTO: 0.1 /100 WBCS (ref 0–0)
NRBC BLD-RTO: 0.1 /100 WBCS (ref 0–0)
OXYHGB MFR BLDA: 91.2 % (ref 94–98)
OXYHGB MFR BLDA: 96.2 % (ref 94–98)
OXYHGB MFR BLDA: 96.9 % (ref 94–98)
OXYHGB MFR BLDA: 97 % (ref 94–98)
OXYHGB MFR BLDA: 97.4 % (ref 94–98)
PCO2 BLDA: 30 MM HG (ref 38–42)
PCO2 BLDA: 31 MM HG (ref 38–42)
PCO2 BLDA: 32 MM HG (ref 38–42)
PCO2 BLDA: 32 MM HG (ref 38–42)
PCO2 BLDA: 38 MM HG (ref 38–42)
PCO2 BLDA: 39 MM HG (ref 38–42)
PCO2 BLDA: 41 MM HG (ref 38–42)
PH BLDA: 7.24 PH (ref 7.38–7.42)
PH BLDA: 7.25 PH (ref 7.38–7.42)
PH BLDA: 7.27 PH (ref 7.38–7.42)
PH BLDA: 7.32 PH (ref 7.38–7.42)
PH BLDA: 7.33 PH (ref 7.38–7.42)
PH BLDA: 7.33 PH (ref 7.38–7.42)
PH BLDA: 7.35 PH (ref 7.38–7.42)
PHOSPHATE SERPL-MCNC: 8.1 MG/DL (ref 2.5–4.9)
PHOSPHATE SERPL-MCNC: 9.4 MG/DL (ref 2.5–4.9)
PLATELET # BLD AUTO: 121 X10*3/UL (ref 150–450)
PLATELET # BLD AUTO: 138 X10*3/UL (ref 150–450)
PLATELET # BLD AUTO: 140 X10*3/UL (ref 150–450)
PLATELET # BLD AUTO: 98 X10*3/UL (ref 150–450)
PO2 BLDA: 115 MM HG (ref 85–95)
PO2 BLDA: 116 MM HG (ref 85–95)
PO2 BLDA: 124 MM HG (ref 85–95)
PO2 BLDA: 125 MM HG (ref 85–95)
PO2 BLDA: 132 MM HG (ref 85–95)
PO2 BLDA: 174 MM HG (ref 85–95)
PO2 BLDA: 68 MM HG (ref 85–95)
POTASSIUM BLDA-SCNC: 5.2 MMOL/L (ref 3.5–5.3)
POTASSIUM BLDA-SCNC: 5.3 MMOL/L (ref 3.5–5.3)
POTASSIUM BLDA-SCNC: 5.4 MMOL/L (ref 3.5–5.3)
POTASSIUM BLDA-SCNC: 5.6 MMOL/L (ref 3.5–5.3)
POTASSIUM BLDA-SCNC: 5.6 MMOL/L (ref 3.5–5.3)
POTASSIUM BLDA-SCNC: 5.7 MMOL/L (ref 3.5–5.3)
POTASSIUM BLDA-SCNC: 6.7 MMOL/L (ref 3.5–5.3)
POTASSIUM SERPL-SCNC: 5.5 MMOL/L (ref 3.5–5.3)
POTASSIUM SERPL-SCNC: 5.6 MMOL/L (ref 3.5–5.3)
POTASSIUM SERPL-SCNC: 6.4 MMOL/L (ref 3.5–5.3)
POTASSIUM SERPL-SCNC: 6.5 MMOL/L (ref 3.5–5.3)
PRODUCT BLOOD TYPE: 7300
PRODUCT BLOOD TYPE: 8400
PRODUCT BLOOD TYPE: 8400
PRODUCT CODE: NORMAL
PROT SERPL-MCNC: 4.6 G/DL (ref 6.4–8.2)
PROT SERPL-MCNC: 4.7 G/DL (ref 6.4–8.2)
PROT SERPL-MCNC: 4.7 G/DL (ref 6.4–8.2)
PROT SERPL-MCNC: 5 G/DL (ref 6.4–8.2)
PROTHROMBIN TIME: 11 SECONDS (ref 9.8–12.8)
PROTHROMBIN TIME: 11.3 SECONDS (ref 9.8–12.8)
RBC # BLD AUTO: 2.53 X10*6/UL (ref 4–5.2)
RBC # BLD AUTO: 2.86 X10*6/UL (ref 4–5.2)
RBC # BLD AUTO: 3 X10*6/UL (ref 4–5.2)
RBC # BLD AUTO: 3.18 X10*6/UL (ref 4–5.2)
SAO2 % BLDA: 100 % (ref 94–100)
SAO2 % BLDA: 100 % (ref 94–100)
SAO2 % BLDA: 94 % (ref 94–100)
SAO2 % BLDA: 98 % (ref 94–100)
SAO2 % BLDA: 98 % (ref 94–100)
SAO2 % BLDA: 99 % (ref 94–100)
SAO2 % BLDA: 99 % (ref 94–100)
SODIUM BLDA-SCNC: 124 MMOL/L (ref 136–145)
SODIUM BLDA-SCNC: 125 MMOL/L (ref 136–145)
SODIUM BLDA-SCNC: 126 MMOL/L (ref 136–145)
SODIUM BLDA-SCNC: 127 MMOL/L (ref 136–145)
SODIUM BLDA-SCNC: 127 MMOL/L (ref 136–145)
SODIUM SERPL-SCNC: 126 MMOL/L (ref 136–145)
SODIUM SERPL-SCNC: 128 MMOL/L (ref 136–145)
SODIUM SERPL-SCNC: 129 MMOL/L (ref 136–145)
SODIUM SERPL-SCNC: 130 MMOL/L (ref 136–145)
UNIT ABO: NORMAL
UNIT NUMBER: NORMAL
UNIT RH: NORMAL
UNIT VOLUME: 197
UNIT VOLUME: 197
UNIT VOLUME: 350
WBC # BLD AUTO: 12.6 X10*3/UL (ref 4.4–11.3)
WBC # BLD AUTO: 14.6 X10*3/UL (ref 4.4–11.3)
WBC # BLD AUTO: 15.1 X10*3/UL (ref 4.4–11.3)
WBC # BLD AUTO: 15.2 X10*3/UL (ref 4.4–11.3)
XM INTEP: NORMAL

## 2024-08-03 PROCEDURE — 85027 COMPLETE CBC AUTOMATED: CPT | Performed by: STUDENT IN AN ORGANIZED HEALTH CARE EDUCATION/TRAINING PROGRAM

## 2024-08-03 PROCEDURE — 82248 BILIRUBIN DIRECT: CPT | Performed by: STUDENT IN AN ORGANIZED HEALTH CARE EDUCATION/TRAINING PROGRAM

## 2024-08-03 PROCEDURE — 94002 VENT MGMT INPAT INIT DAY: CPT

## 2024-08-03 PROCEDURE — 88307 TISSUE EXAM BY PATHOLOGIST: CPT | Performed by: PATHOLOGY

## 2024-08-03 PROCEDURE — 2720000007 HC OR 272 NO HCPCS: Performed by: STUDENT IN AN ORGANIZED HEALTH CARE EDUCATION/TRAINING PROGRAM

## 2024-08-03 PROCEDURE — 59050 FETAL MONITOR W/REPORT: CPT

## 2024-08-03 PROCEDURE — 85384 FIBRINOGEN ACTIVITY: CPT | Performed by: STUDENT IN AN ORGANIZED HEALTH CARE EDUCATION/TRAINING PROGRAM

## 2024-08-03 PROCEDURE — 82330 ASSAY OF CALCIUM: CPT | Performed by: STUDENT IN AN ORGANIZED HEALTH CARE EDUCATION/TRAINING PROGRAM

## 2024-08-03 PROCEDURE — 36415 COLL VENOUS BLD VENIPUNCTURE: CPT

## 2024-08-03 PROCEDURE — 80053 COMPREHEN METABOLIC PANEL: CPT | Performed by: STUDENT IN AN ORGANIZED HEALTH CARE EDUCATION/TRAINING PROGRAM

## 2024-08-03 PROCEDURE — 2780000003 HC OR 278 NO HCPCS: Performed by: STUDENT IN AN ORGANIZED HEALTH CARE EDUCATION/TRAINING PROGRAM

## 2024-08-03 PROCEDURE — 99233 SBSQ HOSP IP/OBS HIGH 50: CPT | Performed by: STUDENT IN AN ORGANIZED HEALTH CARE EDUCATION/TRAINING PROGRAM

## 2024-08-03 PROCEDURE — 83735 ASSAY OF MAGNESIUM: CPT | Performed by: STUDENT IN AN ORGANIZED HEALTH CARE EDUCATION/TRAINING PROGRAM

## 2024-08-03 PROCEDURE — P9017 PLASMA 1 DONOR FRZ W/IN 8 HR: HCPCS

## 2024-08-03 PROCEDURE — 85610 PROTHROMBIN TIME: CPT | Performed by: STUDENT IN AN ORGANIZED HEALTH CARE EDUCATION/TRAINING PROGRAM

## 2024-08-03 PROCEDURE — 74174 CTA ABD&PLVS W/CONTRAST: CPT

## 2024-08-03 PROCEDURE — 0W9G0ZZ DRAINAGE OF PERITONEAL CAVITY, OPEN APPROACH: ICD-10-PCS | Performed by: STUDENT IN AN ORGANIZED HEALTH CARE EDUCATION/TRAINING PROGRAM

## 2024-08-03 PROCEDURE — 2020000001 HC ICU ROOM DAILY

## 2024-08-03 PROCEDURE — P9016 RBC LEUKOCYTES REDUCED: HCPCS

## 2024-08-03 PROCEDURE — 84132 ASSAY OF SERUM POTASSIUM: CPT | Performed by: STUDENT IN AN ORGANIZED HEALTH CARE EDUCATION/TRAINING PROGRAM

## 2024-08-03 PROCEDURE — 80053 COMPREHEN METABOLIC PANEL: CPT

## 2024-08-03 PROCEDURE — 36620 INSERTION CATHETER ARTERY: CPT | Mod: GC | Performed by: STUDENT IN AN ORGANIZED HEALTH CARE EDUCATION/TRAINING PROGRAM

## 2024-08-03 PROCEDURE — 84100 ASSAY OF PHOSPHORUS: CPT | Performed by: STUDENT IN AN ORGANIZED HEALTH CARE EDUCATION/TRAINING PROGRAM

## 2024-08-03 PROCEDURE — 2500000004 HC RX 250 GENERAL PHARMACY W/ HCPCS (ALT 636 FOR OP/ED): Performed by: STUDENT IN AN ORGANIZED HEALTH CARE EDUCATION/TRAINING PROGRAM

## 2024-08-03 PROCEDURE — 2500000002 HC RX 250 W HCPCS SELF ADMINISTERED DRUGS (ALT 637 FOR MEDICARE OP, ALT 636 FOR OP/ED)

## 2024-08-03 PROCEDURE — 83605 ASSAY OF LACTIC ACID: CPT | Performed by: STUDENT IN AN ORGANIZED HEALTH CARE EDUCATION/TRAINING PROGRAM

## 2024-08-03 PROCEDURE — 80076 HEPATIC FUNCTION PANEL: CPT | Mod: CCI | Performed by: STUDENT IN AN ORGANIZED HEALTH CARE EDUCATION/TRAINING PROGRAM

## 2024-08-03 PROCEDURE — 2500000005 HC RX 250 GENERAL PHARMACY W/O HCPCS: Performed by: STUDENT IN AN ORGANIZED HEALTH CARE EDUCATION/TRAINING PROGRAM

## 2024-08-03 PROCEDURE — 37799 UNLISTED PX VASCULAR SURGERY: CPT | Performed by: STUDENT IN AN ORGANIZED HEALTH CARE EDUCATION/TRAINING PROGRAM

## 2024-08-03 PROCEDURE — 88307 TISSUE EXAM BY PATHOLOGIST: CPT | Mod: TC,SUR

## 2024-08-03 PROCEDURE — 2500000001 HC RX 250 WO HCPCS SELF ADMINISTERED DRUGS (ALT 637 FOR MEDICARE OP)

## 2024-08-03 PROCEDURE — 7100000016 HC LABOR RECOVERY PER HOUR

## 2024-08-03 PROCEDURE — 85027 COMPLETE CBC AUTOMATED: CPT

## 2024-08-03 PROCEDURE — 3700000014 HC AN EPIDURAL BLOCK CHARGE: Performed by: STUDENT IN AN ORGANIZED HEALTH CARE EDUCATION/TRAINING PROGRAM

## 2024-08-03 PROCEDURE — 99254 IP/OBS CNSLTJ NEW/EST MOD 60: CPT | Performed by: STUDENT IN AN ORGANIZED HEALTH CARE EDUCATION/TRAINING PROGRAM

## 2024-08-03 PROCEDURE — 36620 INSERTION CATHETER ARTERY: CPT | Performed by: STUDENT IN AN ORGANIZED HEALTH CARE EDUCATION/TRAINING PROGRAM

## 2024-08-03 PROCEDURE — 2500000004 HC RX 250 GENERAL PHARMACY W/ HCPCS (ALT 636 FOR OP/ED)

## 2024-08-03 PROCEDURE — 99291 CRITICAL CARE FIRST HOUR: CPT | Performed by: STUDENT IN AN ORGANIZED HEALTH CARE EDUCATION/TRAINING PROGRAM

## 2024-08-03 PROCEDURE — 99199 UNLISTED SPECIAL SVC PX/RPRT: CPT

## 2024-08-03 PROCEDURE — 82810 BLOOD GASES O2 SAT ONLY: CPT | Performed by: STUDENT IN AN ORGANIZED HEALTH CARE EDUCATION/TRAINING PROGRAM

## 2024-08-03 PROCEDURE — 83735 ASSAY OF MAGNESIUM: CPT

## 2024-08-03 RX ORDER — CARBOPROST TROMETHAMINE 250 UG/ML
250 INJECTION, SOLUTION INTRAMUSCULAR ONCE AS NEEDED
Status: DISCONTINUED | OUTPATIENT
Start: 2024-08-03 | End: 2024-08-03

## 2024-08-03 RX ORDER — HYDRALAZINE HYDROCHLORIDE 20 MG/ML
5 INJECTION INTRAMUSCULAR; INTRAVENOUS ONCE AS NEEDED
Status: DISCONTINUED | OUTPATIENT
Start: 2024-08-03 | End: 2024-08-03

## 2024-08-03 RX ORDER — SODIUM CHLORIDE, SODIUM LACTATE, POTASSIUM CHLORIDE, CALCIUM CHLORIDE 600; 310; 30; 20 MG/100ML; MG/100ML; MG/100ML; MG/100ML
100 INJECTION, SOLUTION INTRAVENOUS CONTINUOUS
Status: DISCONTINUED | OUTPATIENT
Start: 2024-08-03 | End: 2024-08-03

## 2024-08-03 RX ORDER — ALBUMIN HUMAN 50 G/1000ML
SOLUTION INTRAVENOUS AS NEEDED
Status: DISCONTINUED | OUTPATIENT
Start: 2024-08-03 | End: 2024-08-03

## 2024-08-03 RX ORDER — METHYLERGONOVINE MALEATE 0.2 MG/ML
0.2 INJECTION INTRAVENOUS ONCE AS NEEDED
Status: DISCONTINUED | OUTPATIENT
Start: 2024-08-03 | End: 2024-08-03

## 2024-08-03 RX ORDER — PHENYLEPHRINE 10 MG/250 ML(40 MCG/ML)IN 0.9 % SOD.CHLORIDE INTRAVENOUS
CONTINUOUS PRN
Status: DISCONTINUED | OUTPATIENT
Start: 2024-08-03 | End: 2024-08-03

## 2024-08-03 RX ORDER — KETOROLAC TROMETHAMINE 30 MG/ML
30 INJECTION, SOLUTION INTRAMUSCULAR; INTRAVENOUS EVERY 6 HOURS
Status: DISCONTINUED | OUTPATIENT
Start: 2024-08-03 | End: 2024-08-03

## 2024-08-03 RX ORDER — OXYTOCIN/0.9 % SODIUM CHLORIDE 30/500 ML
60 PLASTIC BAG, INJECTION (ML) INTRAVENOUS ONCE AS NEEDED
Status: DISCONTINUED | OUTPATIENT
Start: 2024-08-03 | End: 2024-08-04

## 2024-08-03 RX ORDER — ADHESIVE BANDAGE
10 BANDAGE TOPICAL
Status: DISCONTINUED | OUTPATIENT
Start: 2024-08-03 | End: 2024-08-04

## 2024-08-03 RX ORDER — MIDAZOLAM HYDROCHLORIDE 1 MG/ML
INJECTION INTRAMUSCULAR; INTRAVENOUS AS NEEDED
Status: DISCONTINUED | OUTPATIENT
Start: 2024-08-03 | End: 2024-08-03

## 2024-08-03 RX ORDER — DEXTROSE MONOHYDRATE 100 MG/ML
50 INJECTION, SOLUTION INTRAVENOUS CONTINUOUS
Status: DISCONTINUED | OUTPATIENT
Start: 2024-08-03 | End: 2024-08-04

## 2024-08-03 RX ORDER — PANTOPRAZOLE SODIUM 40 MG/1
40 TABLET, DELAYED RELEASE ORAL
Status: DISCONTINUED | OUTPATIENT
Start: 2024-08-04 | End: 2024-08-05

## 2024-08-03 RX ORDER — TRANEXAMIC ACID 100 MG/ML
1000 INJECTION, SOLUTION INTRAVENOUS ONCE AS NEEDED
Status: DISCONTINUED | OUTPATIENT
Start: 2024-08-03 | End: 2024-08-04

## 2024-08-03 RX ORDER — MAGNESIUM SULFATE HEPTAHYDRATE 40 MG/ML
4 INJECTION, SOLUTION INTRAVENOUS EVERY 6 HOURS PRN
Status: DISCONTINUED | OUTPATIENT
Start: 2024-08-03 | End: 2024-08-04

## 2024-08-03 RX ORDER — DIPHENHYDRAMINE HYDROCHLORIDE 50 MG/ML
25 INJECTION INTRAMUSCULAR; INTRAVENOUS EVERY 4 HOURS PRN
Status: DISCONTINUED | OUTPATIENT
Start: 2024-08-03 | End: 2024-08-03

## 2024-08-03 RX ORDER — LABETALOL HYDROCHLORIDE 5 MG/ML
20 INJECTION, SOLUTION INTRAVENOUS ONCE AS NEEDED
Status: DISCONTINUED | OUTPATIENT
Start: 2024-08-03 | End: 2024-08-03

## 2024-08-03 RX ORDER — HYDROMORPHONE HYDROCHLORIDE 1 MG/ML
0.2 INJECTION, SOLUTION INTRAMUSCULAR; INTRAVENOUS; SUBCUTANEOUS
Status: DISCONTINUED | OUTPATIENT
Start: 2024-08-03 | End: 2024-08-04

## 2024-08-03 RX ORDER — CALCIUM GLUCONATE 20 MG/ML
1 INJECTION, SOLUTION INTRAVENOUS EVERY 6 HOURS PRN
Status: DISCONTINUED | OUTPATIENT
Start: 2024-08-03 | End: 2024-08-04

## 2024-08-03 RX ORDER — OXYTOCIN 10 [USP'U]/ML
10 INJECTION, SOLUTION INTRAMUSCULAR; INTRAVENOUS ONCE AS NEEDED
Status: DISCONTINUED | OUTPATIENT
Start: 2024-08-03 | End: 2024-08-04

## 2024-08-03 RX ORDER — POLYETHYLENE GLYCOL 3350 17 G/17G
17 POWDER, FOR SOLUTION ORAL 2 TIMES DAILY PRN
Status: DISCONTINUED | OUTPATIENT
Start: 2024-08-03 | End: 2024-08-05

## 2024-08-03 RX ORDER — PHENYLEPHRINE HCL IN 0.9% NACL 0.4MG/10ML
SYRINGE (ML) INTRAVENOUS AS NEEDED
Status: DISCONTINUED | OUTPATIENT
Start: 2024-08-03 | End: 2024-08-03

## 2024-08-03 RX ORDER — FENTANYL CITRATE 50 UG/ML
INJECTION, SOLUTION INTRAMUSCULAR; INTRAVENOUS AS NEEDED
Status: DISCONTINUED | OUTPATIENT
Start: 2024-08-03 | End: 2024-08-03

## 2024-08-03 RX ORDER — ONDANSETRON 4 MG/1
4 TABLET, FILM COATED ORAL EVERY 6 HOURS PRN
Status: DISCONTINUED | OUTPATIENT
Start: 2024-08-03 | End: 2024-08-05

## 2024-08-03 RX ORDER — DEXTROSE 50 % IN WATER (D50W) INTRAVENOUS SYRINGE
25 ONCE
Status: COMPLETED | OUTPATIENT
Start: 2024-08-03 | End: 2024-08-03

## 2024-08-03 RX ORDER — FUROSEMIDE 10 MG/ML
20 INJECTION INTRAMUSCULAR; INTRAVENOUS ONCE
Status: COMPLETED | OUTPATIENT
Start: 2024-08-03 | End: 2024-08-03

## 2024-08-03 RX ORDER — CLINDAMYCIN PHOSPHATE 900 MG/50ML
900 INJECTION, SOLUTION INTRAVENOUS ONCE
Status: COMPLETED | OUTPATIENT
Start: 2024-08-03 | End: 2024-08-03

## 2024-08-03 RX ORDER — OXYCODONE HYDROCHLORIDE 5 MG/1
10 TABLET ORAL EVERY 4 HOURS PRN
Status: DISCONTINUED | OUTPATIENT
Start: 2024-08-04 | End: 2024-08-03

## 2024-08-03 RX ORDER — NIFEDIPINE 10 MG/1
10 CAPSULE ORAL ONCE AS NEEDED
Status: DISCONTINUED | OUTPATIENT
Start: 2024-08-03 | End: 2024-08-04

## 2024-08-03 RX ORDER — CALCIUM CHLORIDE INJECTION 100 MG/ML
INJECTION, SOLUTION INTRAVENOUS AS NEEDED
Status: DISCONTINUED | OUTPATIENT
Start: 2024-08-03 | End: 2024-08-03

## 2024-08-03 RX ORDER — BISACODYL 10 MG/1
10 SUPPOSITORY RECTAL DAILY PRN
Status: DISCONTINUED | OUTPATIENT
Start: 2024-08-03 | End: 2024-08-04

## 2024-08-03 RX ORDER — ACETAMINOPHEN 325 MG/1
975 TABLET ORAL EVERY 6 HOURS
Status: DISCONTINUED | OUTPATIENT
Start: 2024-08-03 | End: 2024-08-03

## 2024-08-03 RX ORDER — SIMETHICONE 80 MG
80 TABLET,CHEWABLE ORAL 4 TIMES DAILY PRN
Status: DISCONTINUED | OUTPATIENT
Start: 2024-08-03 | End: 2024-08-05

## 2024-08-03 RX ORDER — PANTOPRAZOLE SODIUM 40 MG/10ML
40 INJECTION, POWDER, LYOPHILIZED, FOR SOLUTION INTRAVENOUS
Status: DISCONTINUED | OUTPATIENT
Start: 2024-08-04 | End: 2024-08-05

## 2024-08-03 RX ORDER — LOPERAMIDE HYDROCHLORIDE 2 MG/1
4 CAPSULE ORAL EVERY 2 HOUR PRN
Status: DISCONTINUED | OUTPATIENT
Start: 2024-08-03 | End: 2024-08-04

## 2024-08-03 RX ORDER — NALOXONE HYDROCHLORIDE 0.4 MG/ML
0.1 INJECTION, SOLUTION INTRAMUSCULAR; INTRAVENOUS; SUBCUTANEOUS EVERY 5 MIN PRN
Status: DISCONTINUED | OUTPATIENT
Start: 2024-08-03 | End: 2024-08-05

## 2024-08-03 RX ORDER — OXYCODONE HYDROCHLORIDE 5 MG/1
5 TABLET ORAL EVERY 4 HOURS PRN
Status: DISCONTINUED | OUTPATIENT
Start: 2024-08-04 | End: 2024-08-03

## 2024-08-03 RX ORDER — ROCURONIUM BROMIDE 10 MG/ML
INJECTION, SOLUTION INTRAVENOUS AS NEEDED
Status: DISCONTINUED | OUTPATIENT
Start: 2024-08-03 | End: 2024-08-03

## 2024-08-03 RX ORDER — IBUPROFEN 400 MG/1
600 TABLET ORAL EVERY 6 HOURS
Status: DISCONTINUED | OUTPATIENT
Start: 2024-08-04 | End: 2024-08-03

## 2024-08-03 RX ORDER — CEFAZOLIN 1 G/1
INJECTION, POWDER, FOR SOLUTION INTRAVENOUS AS NEEDED
Status: DISCONTINUED | OUTPATIENT
Start: 2024-08-03 | End: 2024-08-03

## 2024-08-03 RX ORDER — SODIUM CITRATE AND CITRIC ACID MONOHYDRATE 334; 500 MG/5ML; MG/5ML
SOLUTION ORAL AS NEEDED
Status: DISCONTINUED | OUTPATIENT
Start: 2024-08-03 | End: 2024-08-03

## 2024-08-03 RX ORDER — CALCIUM GLUCONATE 20 MG/ML
2 INJECTION, SOLUTION INTRAVENOUS EVERY 6 HOURS PRN
Status: DISCONTINUED | OUTPATIENT
Start: 2024-08-03 | End: 2024-08-04

## 2024-08-03 RX ORDER — DEXTROSE MONOHYDRATE 100 MG/ML
INJECTION, SOLUTION INTRAVENOUS
Status: DISPENSED
Start: 2024-08-03 | End: 2024-08-03

## 2024-08-03 RX ORDER — FAMOTIDINE 10 MG/ML
INJECTION INTRAVENOUS AS NEEDED
Status: DISCONTINUED | OUTPATIENT
Start: 2024-08-03 | End: 2024-08-03

## 2024-08-03 RX ORDER — ENOXAPARIN SODIUM 100 MG/ML
40 INJECTION SUBCUTANEOUS EVERY 24 HOURS
Status: DISCONTINUED | OUTPATIENT
Start: 2024-08-03 | End: 2024-08-05

## 2024-08-03 RX ORDER — DIPHENHYDRAMINE HCL 25 MG
25 CAPSULE ORAL EVERY 4 HOURS PRN
Status: DISCONTINUED | OUTPATIENT
Start: 2024-08-03 | End: 2024-08-03

## 2024-08-03 RX ORDER — CALCIUM GLUCONATE 20 MG/ML
2 INJECTION, SOLUTION INTRAVENOUS ONCE
Status: DISCONTINUED | OUTPATIENT
Start: 2024-08-03 | End: 2024-08-04

## 2024-08-03 RX ORDER — PROPOFOL 10 MG/ML
INJECTION, EMULSION INTRAVENOUS AS NEEDED
Status: DISCONTINUED | OUTPATIENT
Start: 2024-08-03 | End: 2024-08-03

## 2024-08-03 RX ORDER — ESOMEPRAZOLE MAGNESIUM 40 MG/1
40 GRANULE, DELAYED RELEASE ORAL
Status: DISCONTINUED | OUTPATIENT
Start: 2024-08-04 | End: 2024-08-05

## 2024-08-03 RX ORDER — DEXTROSE 50 % IN WATER (D50W) INTRAVENOUS SYRINGE
Status: DISPENSED
Start: 2024-08-03 | End: 2024-08-04

## 2024-08-03 RX ORDER — PROPOFOL 10 MG/ML
5-50 INJECTION, EMULSION INTRAVENOUS CONTINUOUS
Status: DISCONTINUED | OUTPATIENT
Start: 2024-08-03 | End: 2024-08-04

## 2024-08-03 RX ORDER — MISOPROSTOL 200 UG/1
800 TABLET ORAL ONCE AS NEEDED
Status: DISCONTINUED | OUTPATIENT
Start: 2024-08-03 | End: 2024-08-05

## 2024-08-03 RX ORDER — MAGNESIUM SULFATE HEPTAHYDRATE 40 MG/ML
2 INJECTION, SOLUTION INTRAVENOUS EVERY 6 HOURS PRN
Status: DISCONTINUED | OUTPATIENT
Start: 2024-08-03 | End: 2024-08-04

## 2024-08-03 RX ORDER — LIDOCAINE HYDROCHLORIDE 20 MG/ML
INJECTION, SOLUTION INFILTRATION; PERINEURAL AS NEEDED
Status: DISCONTINUED | OUTPATIENT
Start: 2024-08-03 | End: 2024-08-03

## 2024-08-03 RX ORDER — INSULIN LISPRO 100 [IU]/ML
0-5 INJECTION, SOLUTION INTRAVENOUS; SUBCUTANEOUS EVERY 4 HOURS
Status: DISCONTINUED | OUTPATIENT
Start: 2024-08-03 | End: 2024-08-05

## 2024-08-03 RX ORDER — HYDROMORPHONE HYDROCHLORIDE 1 MG/ML
0.2 INJECTION, SOLUTION INTRAMUSCULAR; INTRAVENOUS; SUBCUTANEOUS EVERY 5 MIN PRN
Status: DISCONTINUED | OUTPATIENT
Start: 2024-08-03 | End: 2024-08-03

## 2024-08-03 RX ORDER — LIDOCAINE 560 MG/1
1 PATCH PERCUTANEOUS; TOPICAL; TRANSDERMAL
Status: DISCONTINUED | OUTPATIENT
Start: 2024-08-03 | End: 2024-08-05

## 2024-08-03 RX ORDER — ONDANSETRON HYDROCHLORIDE 2 MG/ML
4 INJECTION, SOLUTION INTRAVENOUS EVERY 6 HOURS PRN
Status: DISCONTINUED | OUTPATIENT
Start: 2024-08-03 | End: 2024-08-05

## 2024-08-03 RX ADMIN — AMPICILLIN SODIUM 2 G: 2 INJECTION, POWDER, FOR SOLUTION INTRAMUSCULAR; INTRAVENOUS at 03:43

## 2024-08-03 RX ADMIN — ACETAMINOPHEN 975 MG: 325 TABLET ORAL at 12:52

## 2024-08-03 RX ADMIN — GENTAMICIN SULFATE 400 MG: 40 INJECTION, SOLUTION INTRAMUSCULAR; INTRAVENOUS at 14:08

## 2024-08-03 RX ADMIN — MAGNESIUM SULFATE HEPTAHYDRATE 2 G/HR: 40 INJECTION, SOLUTION INTRAVENOUS at 05:37

## 2024-08-03 RX ADMIN — CLINDAMYCIN PHOSPHATE 900 MG: 900 INJECTION, SOLUTION INTRAVENOUS at 07:56

## 2024-08-03 RX ADMIN — Medication 50 PERCENT: at 22:05

## 2024-08-03 RX ADMIN — CALCIUM CARBONATE (ANTACID) CHEW TAB 500 MG 500 MG: 500 CHEW TAB at 09:10

## 2024-08-03 RX ADMIN — ACETAMINOPHEN 975 MG: 325 TABLET ORAL at 06:33

## 2024-08-03 RX ADMIN — NIFEDIPINE 60 MG: 60 TABLET, EXTENDED RELEASE ORAL at 09:10

## 2024-08-03 RX ADMIN — FAMOTIDINE 20 MG: 20 TABLET ORAL at 09:10

## 2024-08-03 RX ADMIN — HYDROMORPHONE HYDROCHLORIDE 0.2 MG: 1 INJECTION, SOLUTION INTRAMUSCULAR; INTRAVENOUS; SUBCUTANEOUS at 02:15

## 2024-08-03 RX ADMIN — FUROSEMIDE 20 MG: 10 INJECTION, SOLUTION INTRAMUSCULAR; INTRAVENOUS at 12:19

## 2024-08-03 RX ADMIN — ENOXAPARIN SODIUM 40 MG: 100 INJECTION SUBCUTANEOUS at 15:10

## 2024-08-03 RX ADMIN — PRENATAL VIT W/ FE FUMARATE-FA TAB 27-0.8 MG 1 TABLET: 27-0.8 TAB at 09:10

## 2024-08-03 RX ADMIN — PROPOFOL 40 MCG/KG/MIN: 10 INJECTION, EMULSION INTRAVENOUS at 22:36

## 2024-08-03 RX ADMIN — KETOROLAC TROMETHAMINE 30 MG: 30 INJECTION, SOLUTION INTRAMUSCULAR; INTRAVENOUS at 12:52

## 2024-08-03 RX ADMIN — KETOROLAC TROMETHAMINE 30 MG: 30 INJECTION, SOLUTION INTRAMUSCULAR; INTRAVENOUS at 06:33

## 2024-08-03 RX ADMIN — LABETALOL HYDROCHLORIDE 400 MG: 100 TABLET, FILM COATED ORAL at 09:10

## 2024-08-03 ASSESSMENT — PAIN SCALES - GENERAL
PAINLEVEL_OUTOF10: 0 - NO PAIN
PAIN_LEVEL: 0
PAINLEVEL_OUTOF10: 0 - NO PAIN
PAINLEVEL_OUTOF10: 6
PAINLEVEL_OUTOF10: 0 - NO PAIN
PAIN_LEVEL: 0

## 2024-08-03 ASSESSMENT — PAIN DESCRIPTION - LOCATION: LOCATION: ABDOMEN

## 2024-08-03 ASSESSMENT — PAIN - FUNCTIONAL ASSESSMENT: PAIN_FUNCTIONAL_ASSESSMENT: CPOT (CRITICAL CARE PAIN OBSERVATION TOOL)

## 2024-08-03 NOTE — ANESTHESIA POSTPROCEDURE EVALUATION
Patient: Jacqueline Ambriz    Procedure Summary       Date: 08/02/24 Room / Location:     Anesthesia Start: 1950 Anesthesia Stop: 08/03/24 0017    Procedure: Labor Analgesia Diagnosis:     Scheduled Providers:  Responsible Provider: Charis Ovalles MD    Anesthesia Type: epidural ASA Status: 2            Anesthesia Type: epidural    Vitals Value Taken Time   /69 08/03/24 0023   Temp 36.4 08/03/24 0023   Pulse 87 08/03/24 0023   Resp 16 08/03/24 0023   SpO2 94 % 08/03/24 0023       Anesthesia Post Evaluation    Patient location during evaluation: floor  Patient participation: complete - patient participated  Level of consciousness: awake and alert  Pain score: 0  Pain management: adequate  Airway patency: patent  Cardiovascular status: stable  Respiratory status: spontaneous ventilation  Hydration status: acceptable  Postoperative Nausea and Vomiting: none        No notable events documented.

## 2024-08-03 NOTE — CONSULTS
[facility]  ACUTE CARE SURGERY - CONSULT    Patient Name: Jacqueline Ambriz  MRN: [id number]  Admit Date: 729  : 1990  AGE: 34 y.o.   GENDER: female  ==============================================================================  TODAY'S ASSESSMENT AND PLAN OF CARE:  34F, POD#1 from , acute Hgb drop this afternoon. Taken back to the OR with OB. Noted to have rectus sheath hematoma. Received 3 RBC, 2 FFP. Intra-op consult.    - No active bleeding seen intra-op. Do not recommend operative exploration  - IR consult   - Abdominal binder  - trend H&H post-op    VIV shira  [facility]  [id number]    ==============================================================================  CHIEF COMPLAINT/REASON FOR CONSULT:   yesterday,  ml. Noted to be more fatigued than expected. CBC showed Hgb from from 11 --> 8.5 --> 7.4 --> 6.9. Taken back to OR with OB. ACS consulted intra-op for rectus sheath hematoma.     PAST MEDICAL HISTORY:   PMH:   Past Medical History:   Diagnosis Date    History of ectopic pregnancy 2023       PSH:   Past Surgical History:   Procedure Laterality Date     SECTION, LOW TRANSVERSE      SALPINGECTOMY Left 2023     FH:   Family History   Family history unknown: Yes     SOCIAL HISTORY:    Smoking:    Social History     Tobacco Use   Smoking Status Never   Smokeless Tobacco Never       Alcohol:    Social History     Substance and Sexual Activity   Alcohol Use Not Currently       MEDICATIONS:   Prior to Admission medications    Medication Sig Start Date End Date Taking? Authorizing Provider   metroNIDAZOLE (Flagyl) 500 mg tablet Take 1 tablet (500 mg) by mouth 2 times a day for 7 days. 24 Yes Kaitlin Slade PA-C   acetaminophen (Tylenol) 500 mg tablet Take 1 tablet (500 mg) by mouth every 6 hours if needed for mild pain (1 - 3) or headaches.    Historical Provider, MD   aspirin 81 mg chewable tablet Chew 1 tablet (81 mg) once  daily. 3/15/24 3/15/25  Ita Gorman MD   blood pressure test kit-large kit 1 applicator once daily. 7/30/24   Krysten Sullivan MD   ferrous gluconate 324 (38 Fe) mg tablet Take 1 tablet (38 mg of iron) by mouth once daily with breakfast. 7/20/24 8/19/24  Park Dorman MD   prenatal vitamin, iron-folic, 27 mg iron-800 mcg folic acid tablet Take 1 tablet by mouth once daily. 2/5/24 2/4/25  Marjan Bearden MD     ALLERGIES:   No Known Allergies    REVIEW OF SYSTEMS:  Review of Systems unable to obtain    PHYSICAL EXAM:  Physical Exam  Intra-op consult:  - noted to have left rectus sheath hematoma; not actively bleeding; not expanding      IMAGING SUMMARY:    None    LABS:  Results from last 7 days   Lab Units 08/03/24  1637 08/03/24  1201 08/03/24  0516 08/02/24  1633 08/02/24  0718   WBC AUTO x10*3/uL 15.2* 14.6* 12.6* 9.9 10.0   HEMOGLOBIN g/dL 6.9* 7.4* 8.5* 11.0* 11.9*   HEMATOCRIT % 20.8* 22.9* 25.9* 33.8* 36.5   PLATELETS AUTO x10*3/uL 138* 140* 121* 131* 170   NEUTROS PCT AUTO %  --   --   --  67.2 69.5   LYMPHS PCT AUTO %  --   --   --  23.6 21.2   MONOS PCT AUTO %  --   --   --  7.1 7.2   EOS PCT AUTO %  --   --   --  0.4 0.3     Results from last 7 days   Lab Units 08/03/24  1637 07/29/24  1152   APTT seconds 33 28   INR  1.0 0.9     Results from last 7 days   Lab Units 08/03/24  1637 08/03/24  1426 08/03/24  1201   SODIUM mmol/L 126* 128* 129*   POTASSIUM mmol/L 6.5* 6.4* 5.6*   CHLORIDE mmol/L 99 100 101   CO2 mmol/L 20* 20* 21   BUN mg/dL 28* 29* 26*   CREATININE mg/dL 1.56* 1.42* 1.30*   CALCIUM mg/dL 6.3* 6.3* 6.4*   PROTEIN TOTAL g/dL 4.7* 4.6* 5.0*   BILIRUBIN TOTAL mg/dL 0.3 0.3 0.3   ALK PHOS U/L 86 94 104   ALT U/L 26 23 25   AST U/L 31 31 26   GLUCOSE mg/dL 93 92 94     Results from last 7 days   Lab Units 08/03/24  1637 08/03/24  1426 08/03/24  1201   BILIRUBIN TOTAL mg/dL 0.3 0.3 0.3     Results from last 7 days   Lab Units 08/03/24  1915 08/03/24  1831 08/03/24  1724   POCT  PH, ARTERIAL pH 7.24* 7.25* 7.35*   POCT PCO2, ARTERIAL mm Hg 38 39 30*   POCT PO2, ARTERIAL mm Hg 124* 174* 115*   POCT HCO3 CALCULATED, ARTERIAL mmol/L 16.3* 17.1* 16.6*   POCT BASE EXCESS, ARTERIAL mmol/L -10.3* -9.3* -8.2*         I have reviewed all laboratory and imaging results ordered/pertinent for this encounter.

## 2024-08-03 NOTE — ANESTHESIA PROCEDURE NOTES
Airway  Date/Time: 8/3/2024 6:19 PM  Urgency: elective    Airway not difficult    Staffing  Performed: resident   Authorized by: Komal Allen MD    Performed by: Azul Balderrama MD  Patient location during procedure: OR    Indications and Patient Condition  Indications for airway management: anesthesia  Spontaneous Ventilation: absent  Sedation level: deep  Preoxygenated: yes  Patient position: sniffing  Mask difficulty assessment: 0 - not attempted  Planned trial extubation    Final Airway Details  Final airway type: endotracheal airway      Successful airway: ETT  Cuffed: yes   Successful intubation technique: video laryngoscopy  Facilitating devices/methods: intubating stylet  Endotracheal tube insertion site: oral  Blade type: glidescope.  Blade size: #3  ETT size (mm): 6.5  Cormack-Lehane Classification: grade I - full view of glottis  Placement verified by: capnometry   Measured from: lips  ETT to lips (cm): 19  Number of attempts at approach: 1

## 2024-08-03 NOTE — CARE PLAN
The patient's goals for the shift include Remain pregnant, rest    The clinical goals for the shift include BP <160/110      Problem: Hypertensive Disorder of Pregnancy (HDP)  Goal: Minimal s/sx of HDP and BP<160/110  Outcome: Progressing  Goal: Adequate urine output (0.5 ml/kg/hr)  Outcome: Progressing     Problem: Safety - Adult  Goal: Free from fall injury  Outcome: Progressing     Problem: Vaginal Birth or  Section  Goal: Minimal s/sx of HDP and BP<160/110  Outcome: Progressing  Goal: No s/sx of infection through recovery  Outcome: Progressing  Goal: No s/sx of hemorrhage through recovery  Outcome: Progressing     Problem:  Recovery Care  Goal: Verbalizes understanding of post-op instructions  Outcome: Progressing  Goal: Manages discomfort  Outcome: Progressing  Goal: Dressing intact until removed with any drainage marked  Outcome: Progressing  Goal: Patient vital signs are stable  Outcome: Progressing  Goal: Urine output is 0.5 mL/kg/hr or more  Outcome: Progressing  Goal: Fundus firm at midline  Outcome: Progressing     Problem: Antepartum  Goal: Maintain pregnancy as long as maternal and/or fetal condition is stable  Outcome: Met  Goal: Avoid/minimize constipation  Outcome: Met  Goal: No decrease in circulation/VTE  Outcome: Met  Goal: FHR remains reassuring  Outcome: Met  Goal: Minimize anxiety/maximize coping  Outcome: Met     Problem: Vaginal Birth or  Section  Goal: Fetal and maternal status remain reassuring during the birth process  Outcome: Met  Goal: Prevention of malpresentation/labor dystocia through delivery  Outcome: Met  Goal: Demonstrates labor coping techniques through delivery  Outcome: Met     Problem:  Labor/Prolonged Premature Rupture of Membranes  Goal: No s/sx of IAI  Outcome: Not met  Note: Pt diagnosed with IAI

## 2024-08-03 NOTE — ANESTHESIA PREPROCEDURE EVALUATION
Patient: Jacqueline Ambriz    Evaluation Method: In-person visit    Procedure Information    Date: 24  Procedure: Labor Consult         Relevant Problems   Anesthesia (within normal limits)      Cardiac   (+) Severe pre-eclampsia in third trimester ([facility])      Pulmonary (within normal limits)      Neuro (within normal limits)      GI   (+) Gastroesophageal reflux disease      /Renal (within normal limits)      Liver (within normal limits)      Endocrine (within normal limits)      Hematology (within normal limits)      HEENT (within normal limits)      ID (within normal limits)      Skin (within normal limits)      GYN   (+) Multigravida of advanced maternal age in second trimester ([facility])   (+) Supervision of high risk pregnancy, antepartum ([facility])       Clinical information reviewed:    Allergies  Meds    Surg Hx            NPO Detail:  NPO/Void Status  Date of Last Liquid: 24  Time of Last Liquid: 1300  Date of Last Solid: 24  Time of Last Solid: 1300         OB/Gyn Evaluation    Present Pregnancy    Patient is pregnant now.  (+) , previous  section, multiple gestation, hypertensive disorder of pregnancy - preeclampsia   Obstetric History    (+)  section, vaginal birth after           Physical Exam    Airway  Mallampati: III  TM distance: <3 FB  Neck ROM: full     Cardiovascular - normal exam  Rhythm: regular  Rate: normal     Dental - normal exam     Pulmonary - normal exam     Abdominal - normal exam  (+) obese         Anesthesia Plan    History of general anesthesia?: yes  History of complications of general anesthesia?: no    ASA 3 - emergent     general   (Pt now PPD #1 s/p c/section with severe PEC.  PP course complicated by LASHAE with increasing CR and increasing K=6.7.  Also decreasing HGB.  Treating increased K with insulin and dextrose.  Will proceed with GETA after that treatment.  Plan for chuck, possible central line, possible ICU +/- intubation  post op.  T&C x 4 PRBC, FFPx2.  RSI with cricoid and glidescope.      )  Patient did not smoke on day of procedure.    intravenous induction   Postoperative administration of opioids is intended.  Trial extubation is planned.  Anesthetic plan and risks discussed with patient.  Use of blood products discussed with patient who consented to blood products.    Plan discussed with attending.

## 2024-08-03 NOTE — L&D DELIVERY NOTE
OB Delivery Note  8/3/2024  Jacqueline Ambriz  34 y.o.   , Low Transverse       Gestational Age: 32w0d  /Para:   Quantitative Blood Loss: Admission to Discharge: 768 mL (2024 10:13 AM - 8/3/2024  2:49 AM)    Laura Ambriz [[id number]]      Labor Events    Rupture date/time: 2024 194  Rupture type: Spontaneous,  Prelabor  Fluid color: Clear, Yellow, Pink  Fluid odor: None  Labor type: Induced Onset of Labor  Labor allowed to proceed with plans for an attempted vaginal birth?: Yes  Induction: Oxytocin  Induction date/time: 2024 1400  Induction indications: Hypertensive Disorder of Pregnancy  Complications: Failure to Progress in First Stage, Intraamniotic Infection       Labor Event Times    Labor onset date/time: 2024 1400  Decision date/time (emergent ): 2024       Labor Length    3rd stage: 0h 02m       Placenta    Placenta delivery date/time: 2024 2331  Placenta removal: Expressed  Placenta appearance: Intact  Placenta disposition: pathology       Cord    Vessels: 3 vessels  Complications: None  Delayed cord clamping?: Yes  Cord clamped date/time: 2024 23:30:00  Cord blood disposition: Discarded  Gases sent?: Yes  Stem cell collection (by provider): No       Lacerations    Episiotomy: None  Perineal laceration: None  Other lacerations?: No       Anesthesia    Method: Epidural       Operative Delivery    Forceps attempted?: No  Vacuum extractor attempted?: No       Shoulder Dystocia    Shoulder dystocia present?: No       [place] Delivery    Birth date/time: 2025 23:29:00  Delivery type: , Low Transverse   categorization: repeat   priority: urgent  Indications for : Unsuccessful Induction of Labor, Repeat   Incision type: low transverse  Complications: Failure to Progress in First Stage, Intraamniotic Infection       Resuscitation    Method: Continuous positive airway pressure (CPAP),  Positive pressure ventilation (PPV), Supplemental oxygen, Suctioning, Tactile stimulation       Apgars    Living status: Living  Apgar Component Scores:  1 min.:  5 min.:  10 min.:  15 min.:  20 min.:    Skin color:  0  1  1      Heart rate:  2  1  2      Reflex irritability:  1  1  2      Muscle tone:  1  1  1      Respiratory effort:  0  0  2      Total:  4  4  8      Apgars assigned by: JOSÉ MIGUEL MIN DO       Delivery Providers    Delivering clinician: Jyoti Cunningham MD   Provider Role    Peg Kilgore RN Delivery Nurse    Lauren Livingston RN Nursery Nurse    Park Dorman MD Resident                 Operative Dictation    Pre-Operative Diagnosis: Pre-Eclampsia with Severe Features, PPROM, IAI, History of prior  section    Post-Operative Diagnosis: Same    Findings: Normal appearing gravid uterus, fallopian tubes, and ovaries. Amniotic fluid clear, female infant in cephalic presentation. Moderate adhesive disease within the subcutaneous tissues and fascia. No adhesive disease noted in the abdominal cavity    Procedure:   Patient was taken to the OR where epidural anesthesia was redosed.  She was then placed in the dorsal supine position with a left lateral tilt. A arguello catheter was placed, SCDs were applied, and a vaginal prep was performed. A pre-procedure time out was performed.  The patient was given prophylactic dose of IV antibiotics. She was then prepped and draped in the usual sterile fashion.     A Pfannenstiel skin incision was made through the skin with the scalpel and then carried through the subcutaneous fat to the underlying fascial layer with sharp dissection. Moderate adhesive disease noted within the subcutaneous tissues to the level of the fascia. The fascia was then incised on either side of the midline with the scalpel, and fascia was then dissected off the rectus muscle bilaterally using sharp dissection with electrocautery with clear visualization. The superior aspect of the incision  was grasped, tented up with Kocher clamps and the rectus muscle was dissected off bluntly. The muscles were divided in the midline, the peritoneum was identified and then entered sharply with direct visualization. The incision extended superiorly and inferiorly with good visualization of bladder below. Bladder blade was inserted. Minimal to no adhesive disease was present in the intra-abdominal cavity.    A low transverse uterine incision was made with the scalpel, the uterine cavity was entered, and the hysterotomy was extended bilaterally with cephalocaudal stretching. The infant was delivered atraumatically, the cord was clamped and cut, and infant was handed off to the awaiting nursing staff. A cord segment and blood sample were collected.  The placenta was then expressed. The uterus was exteriorized and cleared of all clot and debris. The uterine incision was repaired using a running stitch of 0-Vicryl . A second layer of the same suture was placed in an imbricating fashion. A single figure of 8 stitch was placed in the midline of the hysterotomy. Good hemostasis was noted. The uterus was then placed back inside the pelvis, the gutters were cleared of all clots and debris. The hysterotomy was again evaluated and found to be hemostatic, and the underside of the fascia and bladder and the rectus muscles were also found to be hemostatic.     The fascia was closed using a running stitch of 0-PDS. The subcutaneous layer was irrigated, small bleeding vessels were cauterized. The skin was closed with 4-0 Monocryl.  All counts were correct, the patient tolerated the procedure well. The patient and infant were taken back to the recovery room in stable condition.    Dr. Cunningham was present for all key portions of the procedure.     Intrauterine Device Inserted : No, patient electing for nexplanon    Park Dorman MD

## 2024-08-03 NOTE — PROCEDURES
Arterial Line:    Date/Time: 8/3/2024 5:15 PM    Staffing  Performed: resident   Authorized by: Demond Leal MD    Performed by: Demond Leal MD    An arterial line was placed. Procedure performed using surface landmarks.in the OR for the following indication(s): continuous blood pressure monitoring and blood sampling needed.    A 20 gauge (size), 1 and 3/4 inch (length), Angiocath (type) catheter was placed into the Right radial artery, secured by Tegaderm,   Seldinger technique used.  Events:  patient tolerated procedure well with no complications.

## 2024-08-03 NOTE — SIGNIFICANT EVENT
LATE ENTRY DUE TO PATIENT CARE    To bedside due to RN concerned about severe fatigue in patient. At bedside patient arousable however very sleepy. Reports feeling weakness. Denies any HA, visual changes, CP or RUQ pain. She has intermittent SOB.     General: sleepy, easily arousable  HEENT: normocephalic, atraumatic  Heart: warm and well perfused, RRR  Lungs: no increased work of breathing, fine crackles in BLL  Abdomen: soft, nontender to palpation, mild distention, lower abdominal incision with bandage in place, no shadowing noted  Extremities: moving all extremities  Neuro: awake and conversant, 2+ DTR in UE  Psych: appropriate mood and affect    S/p RCS  - pain well controlled with PO meds, afebrile  - Anti-emetics PRN and bowel regimen ordered  - Continue routine postpartum care.   - Hgb 11.0 ->  -> POD#1 Hgb pending      sPEC  - s/o IV hydral 5/10, labetalol 20, IV labetalol 20/20 2200 8/2  - nifed 30->60 BID ( 7/29), Labetalol 400 BID (8/2 PM)  - s/p lasix 20 for pulm edema on cxr and O2 req early AM however fine crackle noted on exam, will given another dose of IV lasix  - P:C 0.45  - Mag discontinued due to concern for toxicity given clinical symptoms. CBC, CMP, Mag level pensding      Discussed with Dr. Dania Colon MD PGY-3

## 2024-08-03 NOTE — H&P
History Of Present Illness  Jacqueline Ambriz is a 34 y.o.  female with preeclampsia with severe features (by BP criteria) on POD 1 from  section who presents after ex-lap for suspected abdominal bleeding. Pt had complaints of abdominal distention this AM, fluid visualized on U/s. Labs significant for K 6.4 and repeat 6.5, Mg of 8.31, Hgb of 6.9. Met criteria for magnesium toxicity with a level of 8.92, now down to 8.31. Pt was taken back to the OR for abdominal washout w/ 400cc blood found upon opening, large rectus sheath hematoma (estimated 500mL) and blood found behind bladder. Pt was transfused 3 RBC, 2 FFP and 500 5% albumin. Patient also had hypermagnesemia, ELMIRA w/ low UOP, HD instability. Pt admitted to SICU for escalation of care. Plan for CTA Abdomen and possible IR embolization.      OR Course:   EBL: 1018  UOP: 10  Crystalloid: 1.5L LR  Colloid: 500 5% Albumin   Products: 3 RBC, 2 FFP  Antibiotic time:   Intubation: Easy, G1v, Glide 3, 6.5 ETT @ 20cm   Lines/Access: Rt Rad ART, LAC 16g, RAC 14g     Past Medical History  Past Medical History:   Diagnosis Date    History of ectopic pregnancy 2023     Surgical History  Past Surgical History:   Procedure Laterality Date     SECTION, LOW TRANSVERSE      SALPINGECTOMY Left 2023     Social History  She reports that she has never smoked. She has never used smokeless tobacco. She reports that she does not currently use alcohol. She reports that she does not use drugs.    Family History  Family History   Family history unknown: Yes     Current Outpatient Medications   Medication Instructions    acetaminophen (Tylenol) 500 mg tablet 1 tablet, oral, Every 6 hours PRN    aspirin 81 mg, oral, Daily    blood pressure test kit-large kit 1 applicator, miscellaneous, Daily    ferrous gluconate 324 (38 Fe) mg tablet 38 mg of iron, oral, Daily with breakfast    prenatal vitamin, iron-folic, 27 mg iron-800 mcg folic acid tablet 1 tablet, oral,  "Daily     Allergies  Patient has no known allergies.    Review of Systems   Unable to perform ROS: Intubated     Physical Exam  Vitals and nursing note reviewed.   Constitutional:       Appearance: She is obese.      Interventions: She is sedated, intubated and restrained.   HENT:      Head: Normocephalic and atraumatic.      Mouth/Throat:      Mouth: Mucous membranes are moist.      Pharynx: Oropharynx is clear.      Comments: OGT  Eyes:      Conjunctiva/sclera: Conjunctivae normal.   Cardiovascular:      Rate and Rhythm: Normal rate and regular rhythm.      Pulses: Normal pulses.      Arteriovenous access: Right arteriovenous access is present.     Comments: Significant edema   Pulmonary:      Effort: She is intubated.      Breath sounds: Normal breath sounds.      Comments: Intubated ETT 6.5  Abdominal:      General: Bowel sounds are decreased.      Comments: Gravid uterus  Firm abdomen   LT C/s incision + Ex-lap incision   Genitourinary:     Comments: Ahumada  Musculoskeletal:      Right lower le+ Edema present.      Left lower le+ Pitting Edema present.   Skin:     General: Skin is warm.   Neurological:      Comments: Sedated on propofol          Last Recorded Vitals  Blood pressure 128/87, pulse 76, temperature 36 °C (96.8 °F), resp. rate 18, height 1.549 m (5' 1\"), weight 87.8 kg (193 lb 9 oz), last menstrual period 2023, SpO2 97%, currently breastfeeding.    Relevant Results  Scheduled medications  acetaminophen, 975 mg, oral, q6h  calcium carbonate, 500 mg, oral, Daily  calcium gluconate, 2 g, intravenous, Once  dextrose, , ,   enoxaparin, 40 mg, subcutaneous, q24h  famotidine, 20 mg, oral, BID  ketorolac, 30 mg, intravenous, q6h   Followed by  [START ON 2024] ibuprofen, 600 mg, oral, q6h  labetalol, 200 mg, oral, Once  labetalol, 400 mg, oral, BID  NIFEdipine ER, 60 mg, oral, q12h  prenatal vitamin (iron-folic), 1 tablet, oral, Daily  sodium zirconium cyclosilicate, 10 g, oral, " q8h    Continuous medications  dextrose 10 % in water (D10W), 50 mL/hr  lactated Ringer's, 20 mL/hr, Last Rate: 20 mL/hr (08/03/24 1550)  magnesium sulfate, 2 g/hr, Last Rate: Stopped (08/03/24 1158)    PRN medications  PRN medications: benzocaine-menthoL, bisacodyl, calcium gluconate, carboprost, dextrose, diphenhydrAMINE **OR** diphenhydrAMINE, hydrALAZINE, HYDROmorphone, labetaloL, lanolin, lidocaine, loperamide, magnesium hydroxide, measles, mumps and rubella, methylergonovine, metoclopramide **OR** metoclopramide, miSOPROStoL, naloxone, NIFEdipine, ondansetron **OR** ondansetron, ondansetron **OR** ondansetron, [START ON 8/4/2024] oxyCODONE, [START ON 8/4/2024] oxyCODONE, oxytocin, oxytocin, oxytocin, oxytocin, oxytocin, polyethylene glycol, psyllium, simethicone, tranexamic acid, witch hazel      XR chest 1 view   Final Result   1.  No evidence of acute cardiopulmonary process.             Signed by: Ji Rodriguez 8/2/2024 11:01 PM   Dictation workstation:   [id number]      US fetal biophysical profile wo non stress testing   Final Result      US OB limited 1+ fetuses   Final Result      MR brain wo IV contrast   Final Result   MRI Brain:   1. No evidence of acute infarct, intracranial mass effect or midline   shift.   2. Chronic lacunar infarct in the right inferior cerebellar   hemisphere.        MRA/Venogram:   1. No evidence of stenosis or large vessel occlusion intracranially.   2. There is diminished flow enhancement within the right sigmoid   sinus with associated smaller right jugular foramen, which may be a   normal developmental variant. There is also absent flow enhancement   within the right transverse sinus without secondary signs of acute   dural venous sinus thrombosis on brain MRI, which may also be   congenital. Clinical correlation is recommended.        I personally reviewed the images/study and I agree with the findings   as stated above by resident physician, Dr. Kelvin Lindsey.         MACRO:   None        Signed by: Ji Rodriguez 7/30/2024 12:10 AM   Dictation workstation:   [id number]      MR angio head wo IV contrast   Final Result   MRI Brain:   1. No evidence of acute infarct, intracranial mass effect or midline   shift.   2. Chronic lacunar infarct in the right inferior cerebellar   hemisphere.        MRA/Venogram:   1. No evidence of stenosis or large vessel occlusion intracranially.   2. There is diminished flow enhancement within the right sigmoid   sinus with associated smaller right jugular foramen, which may be a   normal developmental variant. There is also absent flow enhancement   within the right transverse sinus without secondary signs of acute   dural venous sinus thrombosis on brain MRI, which may also be   congenital. Clinical correlation is recommended.        I personally reviewed the images/study and I agree with the findings   as stated above by resident physician, Dr. Kelvin Lindsey.        MACRO:   None        Signed by: Ji Rodriguez 7/30/2024 12:10 AM   Dictation workstation:   [id number]      MR venography intracranial wo IV contrast   Final Result   MRI Brain:   1. No evidence of acute infarct, intracranial mass effect or midline   shift.   2. Chronic lacunar infarct in the right inferior cerebellar   hemisphere.        MRA/Venogram:   1. No evidence of stenosis or large vessel occlusion intracranially.   2. There is diminished flow enhancement within the right sigmoid   sinus with associated smaller right jugular foramen, which may be a   normal developmental variant. There is also absent flow enhancement   within the right transverse sinus without secondary signs of acute   dural venous sinus thrombosis on brain MRI, which may also be   congenital. Clinical correlation is recommended.        I personally reviewed the images/study and I agree with the findings   as stated above by resident physician, Dr. Kelvin Lindsey.        MACRO:   None        Signed  by: Ji Rodriguez 7/30/2024 12:10 AM   Dictation workstation:   [id number]      Consult to Interventional Radiology    (Results Pending)   CT angio abdomen pelvis w and or wo IV IV contrast    (Results Pending)      Results for orders placed or performed during the hospital encounter of 07/29/24 (from the past 24 hour(s))   Comprehensive metabolic panel   Result Value Ref Range    Glucose 94 74 - 99 mg/dL    Sodium 132 (L) 136 - 145 mmol/L    Potassium 4.8 3.5 - 5.3 mmol/L    Chloride 105 98 - 107 mmol/L    Bicarbonate 18 (L) 21 - 32 mmol/L    Anion Gap 14 10 - 20 mmol/L    Urea Nitrogen 22 6 - 23 mg/dL    Creatinine 0.97 0.50 - 1.05 mg/dL    eGFR 78 >60 mL/min/1.73m*2    Calcium 7.1 (L) 8.6 - 10.6 mg/dL    Albumin 3.0 (L) 3.4 - 5.0 g/dL    Alkaline Phosphatase 133 (H) 33 - 110 U/L    Total Protein 5.7 (L) 6.4 - 8.2 g/dL    AST 34 9 - 39 U/L    Bilirubin, Total 0.6 0.0 - 1.2 mg/dL    ALT 31 7 - 45 U/L   Prepare RBC: 1 Units   Result Value Ref Range    PRODUCT CODE L2976W29     Unit Number [id number]     Unit ABO B     Unit RH POS     XM INTEP COMP     Dispense Status TR     Blood Expiration Date 8/19/2024 11:59:00 PM EDT     PRODUCT BLOOD TYPE 7300     UNIT VOLUME 350    Blood Gas Cord Venous   Result Value Ref Range    POCT PH Venous, Cord 7.32 7.19 - 7.47 pH    POCT PCO2 Venous, Cord 41 22 - 53 mm Hg    POCT PO2 Venous, Cord 27 13 - 37 mm Hg    POCT SO2 Venous, Cord 58 16 - 84 %    POCT OXYHEMOGLOBIN VENOUS, Cord 56.6 (L) 94.0 - 98.0 %    POCT Base Excess Venous, Cord -4.8 -8.1 - -0.5 mmol/L    POCT HCO3 Calculated Venous, Cord 21.1 16.0 - 26.0 mmol/L    Patient Temperature 37.0 degrees Celsius    FiO2 21 %   CBC   Result Value Ref Range    WBC 12.6 (H) 4.4 - 11.3 x10*3/uL    nRBC 0.0 0.0 - 0.0 /100 WBCs    RBC 3.18 (L) 4.00 - 5.20 x10*6/uL    Hemoglobin 8.5 (L) 12.0 - 16.0 g/dL    Hematocrit 25.9 (L) 36.0 - 46.0 %    MCV 81 80 - 100 fL    MCH 26.7 26.0 - 34.0 pg    MCHC 32.8 32.0 - 36.0 g/dL    RDW 16.7  (H) 11.5 - 14.5 %    Platelets 121 (L) 150 - 450 x10*3/uL   Comprehensive metabolic panel   Result Value Ref Range    Glucose 94 74 - 99 mg/dL    Sodium 129 (L) 136 - 145 mmol/L    Potassium 5.6 (H) 3.5 - 5.3 mmol/L    Chloride 101 98 - 107 mmol/L    Bicarbonate 21 21 - 32 mmol/L    Anion Gap 13 10 - 20 mmol/L    Urea Nitrogen 26 (H) 6 - 23 mg/dL    Creatinine 1.30 (H) 0.50 - 1.05 mg/dL    eGFR 55 (L) >60 mL/min/1.73m*2    Calcium 6.4 (L) 8.6 - 10.6 mg/dL    Albumin 2.7 (L) 3.4 - 5.0 g/dL    Alkaline Phosphatase 104 33 - 110 U/L    Total Protein 5.0 (L) 6.4 - 8.2 g/dL    AST 26 9 - 39 U/L    Bilirubin, Total 0.3 0.0 - 1.2 mg/dL    ALT 25 7 - 45 U/L   Magnesium   Result Value Ref Range    Magnesium 8.92 (HH) 1.60 - 2.40 mg/dL   CBC   Result Value Ref Range    WBC 14.6 (H) 4.4 - 11.3 x10*3/uL    nRBC 0.0 0.0 - 0.0 /100 WBCs    RBC 2.86 (L) 4.00 - 5.20 x10*6/uL    Hemoglobin 7.4 (L) 12.0 - 16.0 g/dL    Hematocrit 22.9 (L) 36.0 - 46.0 %    MCV 80 80 - 100 fL    MCH 25.9 (L) 26.0 - 34.0 pg    MCHC 32.3 32.0 - 36.0 g/dL    RDW 16.7 (H) 11.5 - 14.5 %    Platelets 140 (L) 150 - 450 x10*3/uL   Magnesium   Result Value Ref Range    Magnesium 8.31 (HH) 1.60 - 2.40 mg/dL   Comprehensive metabolic panel   Result Value Ref Range    Glucose 92 74 - 99 mg/dL    Sodium 128 (L) 136 - 145 mmol/L    Potassium 6.4 (HH) 3.5 - 5.3 mmol/L    Chloride 100 98 - 107 mmol/L    Bicarbonate 20 (L) 21 - 32 mmol/L    Anion Gap 14 10 - 20 mmol/L    Urea Nitrogen 29 (H) 6 - 23 mg/dL    Creatinine 1.42 (H) 0.50 - 1.05 mg/dL    eGFR 50 (L) >60 mL/min/1.73m*2    Calcium 6.3 (L) 8.6 - 10.6 mg/dL    Albumin 2.5 (L) 3.4 - 5.0 g/dL    Alkaline Phosphatase 94 33 - 110 U/L    Total Protein 4.6 (L) 6.4 - 8.2 g/dL    AST 31 9 - 39 U/L    Bilirubin, Total 0.3 0.0 - 1.2 mg/dL    ALT 23 7 - 45 U/L   Comprehensive metabolic panel   Result Value Ref Range    Glucose 93 74 - 99 mg/dL    Sodium 126 (L) 136 - 145 mmol/L    Potassium 6.5 (HH) 3.5 - 5.3 mmol/L     Chloride 99 98 - 107 mmol/L    Bicarbonate 20 (L) 21 - 32 mmol/L    Anion Gap 14 10 - 20 mmol/L    Urea Nitrogen 28 (H) 6 - 23 mg/dL    Creatinine 1.56 (H) 0.50 - 1.05 mg/dL    eGFR 44 (L) >60 mL/min/1.73m*2    Calcium 6.3 (L) 8.6 - 10.6 mg/dL    Albumin 2.7 (L) 3.4 - 5.0 g/dL    Alkaline Phosphatase 86 33 - 110 U/L    Total Protein 4.7 (L) 6.4 - 8.2 g/dL    AST 31 9 - 39 U/L    Bilirubin, Total 0.3 0.0 - 1.2 mg/dL    ALT 26 7 - 45 U/L   CBC   Result Value Ref Range    WBC 15.2 (H) 4.4 - 11.3 x10*3/uL    nRBC 0.1 (H) 0.0 - 0.0 /100 WBCs    RBC 2.53 (L) 4.00 - 5.20 x10*6/uL    Hemoglobin 6.9 (L) 12.0 - 16.0 g/dL    Hematocrit 20.8 (L) 36.0 - 46.0 %    MCV 82 80 - 100 fL    MCH 27.3 26.0 - 34.0 pg    MCHC 33.2 32.0 - 36.0 g/dL    RDW 16.8 (H) 11.5 - 14.5 %    Platelets 138 (L) 150 - 450 x10*3/uL   Fibrinogen   Result Value Ref Range    Fibrinogen 471 (H) 200 - 400 mg/dL   Coagulation Screen   Result Value Ref Range    Protime 11.3 9.8 - 12.8 seconds    INR 1.0 0.9 - 1.1    aPTT 33 27 - 38 seconds   Prepare Cryoprecipitated AHF (Pooled Units): 2 Pools   Result Value Ref Range    PRODUCT CODE R8335C11     Unit Number [id number]     Unit ABO B     Unit RH POS     Dispense Status IS     Blood Expiration Date 8/3/2024 11:34:00 PM EDT     PRODUCT BLOOD TYPE 7300     UNIT VOLUME 79     PRODUCT CODE T1295Q63     Unit Number [id number]     Unit ABO B     Unit RH POS     Dispense Status IS     Blood Expiration Date 8/3/2024 11:34:00 PM EDT     PRODUCT BLOOD TYPE 7300     UNIT VOLUME 76    Blood Gas Arterial Full Panel   Result Value Ref Range    POCT pH, Arterial 7.35 (L) 7.38 - 7.42 pH    POCT pCO2, Arterial 30 (L) 38 - 42 mm Hg    POCT pO2, Arterial 115 (H) 85 - 95 mm Hg    POCT SO2, Arterial 98 94 - 100 %    POCT Oxy Hemoglobin, Arterial 97.4 94.0 - 98.0 %    POCT Hematocrit Calculated, Arterial 20.0 (L) 36.0 - 46.0 %    POCT Sodium, Arterial 124 (L) 136 - 145 mmol/L    POCT Potassium, Arterial 6.7 (HH) 3.5 - 5.3  mmol/L    POCT Chloride, Arterial      POCT Ionized Calcium, Arterial 0.91 (L) 1.10 - 1.33 mmol/L    POCT Glucose, Arterial 107 (H) 74 - 99 mg/dL    POCT Lactate, Arterial 1.1 0.4 - 2.0 mmol/L    POCT Base Excess, Arterial -8.2 (L) -2.0 - 3.0 mmol/L    POCT HCO3 Calculated, Arterial 16.6 (L) 22.0 - 26.0 mmol/L    POCT Hemoglobin, Arterial 6.8 (L) 12.0 - 16.0 g/dL    POCT Anion Gap, Arterial      Patient Temperature 37.0 degrees Celsius    FiO2 21 %   Prepare RBC: 3 Units   Result Value Ref Range    PRODUCT CODE Y1972K66     Unit Number [id number]     Unit ABO O     Unit RH POS     XM INTEP COMP     Dispense Status TR     Blood Expiration Date 8/28/2024 11:59:00 PM EDT     PRODUCT BLOOD TYPE 5100     UNIT VOLUME 281     PRODUCT CODE X3058B94     Unit Number [id number]     Unit ABO O     Unit RH POS     XM INTEP COMP     Dispense Status TR     Blood Expiration Date 8/30/2024 11:59:00 PM EDT     PRODUCT BLOOD TYPE 5100     UNIT VOLUME 288     PRODUCT CODE I7222F06     Unit Number [id number]     Unit ABO O     Unit RH POS     XM INTEP COMP     Dispense Status IS     Blood Expiration Date 8/24/2024 11:59:00 PM EDT     PRODUCT BLOOD TYPE 5100     UNIT VOLUME 328    Prepare Plasma: 1 Units   Result Value Ref Range    PRODUCT CODE W5259D79     Unit Number [id number]     Unit ABO AB     Unit RH POS     Dispense Status TR     Blood Expiration Date 8/8/2024  3:32:00 AM EDT     PRODUCT BLOOD TYPE 8400     UNIT VOLUME 197    Prepare Plasma: 1 Units   Result Value Ref Range    PRODUCT CODE F5855E76     Unit Number [id number]     Unit ABO AB     Unit RH POS     Dispense Status TR     Blood Expiration Date 8/8/2024  3:32:00 AM EDT     PRODUCT BLOOD TYPE 8400     UNIT VOLUME 197    BLOOD GAS ARTERIAL FULL PANEL   Result Value Ref Range    POCT pH, Arterial 7.25 (LL) 7.38 - 7.42 pH    POCT pCO2, Arterial 39 38 - 42 mm Hg    POCT pO2, Arterial 174 (H) 85 - 95 mm Hg    POCT SO2, Arterial 98 94 - 100 %    POCT  Oxy Hemoglobin, Arterial 97.0 94.0 - 98.0 %    POCT Hematocrit Calculated, Arterial 16.0 (L) 36.0 - 46.0 %    POCT Sodium, Arterial 124 (L) 136 - 145 mmol/L    POCT Potassium, Arterial 5.6 (H) 3.5 - 5.3 mmol/L    POCT Chloride, Arterial 98 98 - 107 mmol/L    POCT Ionized Calcium, Arterial 1.32 1.10 - 1.33 mmol/L    POCT Glucose, Arterial 286 (H) 74 - 99 mg/dL    POCT Lactate, Arterial 2.0 0.4 - 2.0 mmol/L    POCT Base Excess, Arterial -9.3 (L) -2.0 - 3.0 mmol/L    POCT HCO3 Calculated, Arterial 17.1 (L) 22.0 - 26.0 mmol/L    POCT Hemoglobin, Arterial 5.4 (LL) 12.0 - 16.0 g/dL    POCT Anion Gap, Arterial 15 10 - 25 mmo/L    Patient Temperature 37.0 degrees Celsius    FiO2 21 %   Blood Gas Arterial Full Panel   Result Value Ref Range    POCT pH, Arterial 7.24 (LL) 7.38 - 7.42 pH    POCT pCO2, Arterial 38 38 - 42 mm Hg    POCT pO2, Arterial 124 (H) 85 - 95 mm Hg    POCT SO2, Arterial 100 94 - 100 %    POCT Oxy Hemoglobin, Arterial 97.4 94.0 - 98.0 %    POCT Hematocrit Calculated, Arterial 28.0 (L) 36.0 - 46.0 %    POCT Sodium, Arterial 124 (L) 136 - 145 mmol/L    POCT Potassium, Arterial 5.3 3.5 - 5.3 mmol/L    POCT Chloride, Arterial 99 98 - 107 mmol/L    POCT Ionized Calcium, Arterial 0.98 (L) 1.10 - 1.33 mmol/L    POCT Glucose, Arterial 112 (H) 74 - 99 mg/dL    POCT Lactate, Arterial 2.0 0.4 - 2.0 mmol/L    POCT Base Excess, Arterial -10.3 (L) -2.0 - 3.0 mmol/L    POCT HCO3 Calculated, Arterial 16.3 (L) 22.0 - 26.0 mmol/L    POCT Hemoglobin, Arterial 9.3 (L) 12.0 - 16.0 g/dL    POCT Anion Gap, Arterial 14 10 - 25 mmo/L    Patient Temperature 37.0 degrees Celsius    FiO2 21 %      Assessment/Plan   Principal Problem:    Indication for care in labor and delivery, antepartum ([facility])  Active Problems:    Severe pre-eclampsia in third trimester ([facility])    Gastroesophageal reflux disease    Assessment:  Assessment:  Jacqueline Ambriz is a 34 y.o. female with a history of preeclampsia with severe features this current  pregnancy s/p repeat  on . Pt was found to hypotensive, tachycardiac w/ dropping Hgb. Pt taken back to OR for Ex-lap on 8/3 where large rectal sheath hematoma and hemoperitoneum found. Pt admitted to SICU on 8/3 for further management and is pending CTA Abdomen w/ possible IR embolization.      Plan:  NEURO: Hx of Anxiety. Pt arrived to SICU intubated and sedated on Propofol infusion following Ex-lap on 8/3/24.   - Ongoing neuro and pain assessments  - Continue Propofol infusion while sedated for RASS 0 to -2  - Pt is not yet reversed from OR, will formally reverse pending CTA and IR   - PRN Hydromorphone for CPOT 3  - Lidoderm patches surrounding surgical incision  - PT/OT consult     CV: History of preeclampsia with severe features this pregnancy based on BP criteria. Pt arrived to SICU HDS and off pressors. Pt is NSR w/ -140/80-90.   - Hold Labetolol 400mg BID and Nifedipine 60mg BID given acute bleeding and possible OR   - Continuous EKG/abp monitoring  - Goal map range 65-90  - Home meds: Labetalol 400mg BID + Nifedipine 60mg BID   - Magnesium infusion for Pre-eclampsia held given Magnesium toxicity ( Mg 8.9, now 6.4) earlier in day     PULM: No prior hx, not a current smoker. Received Lasix 20 . Arrived to SICU intubated. Pt requiring PEEP 7-9.   - Wean FiO2 as tolerated.    - Q1h incentive spirometer while awake  - Additional pulm toilet prn.    - F/U post op CXR and daily AM CXR      GI: Hx of pregnancy-related GERD, on Famotidine. Pre-eclampsia w/ severe features. Repeat LT Csection on  following by Ex-Lap for hemoperitoneum/rectal sheath hematoma on 8/3.   - NPO   - OG to LIWS  - Advance diet per surgical service  - PPI for GI prophylaxis  - Daily LFTs given SiPEC     OBGYN:  female at 3wks (baby currently in NICU) c/b preeclampsia with severe features, POD1 from repeat Csection () c/b hemoperitoneum/rectus sheath hematoma (EBL 1L)   - No need to continue Magnesium  infusion for pre-eclampsia after 24hrs post-partum  - Breast pump for lactation simulation, consult lactation   - OBGYN recs appreciated     : ELMIRA (Serum Cr 0.97-->1.56) w/ low UOP, arguello in place, c/f possible bladder injury. Received Lasix 20 8/2. Pt hyperkalemic w/ K+ 5-6 s/p intra-op K cocktail. Baseline creatinine 0.8-1.  - S/p Lokelma 10 g  - Maintenance IVF  - Volume resuscitation as needed  - Maintain U/O >0.5ml/kg/hr  - Check renal function panel post op and daily  - Replete electrolytes to goal K>4, Mg>2, Phos>2.5, ionized Ca>1.10.     HEME: Acute blood loss anemia. Pre-op Hgb 11 to 6.9 on 8/3. OR EBL 1L. Intra-op received 3 RBC, 2FFP. Hgb now 8.5. Thrombocytopenia w/ pregnancy, Plt 98. Pt to have CTA +/- IR embolization for bleeding.  - Check CBC and coags post op and daily  - SCDs for DVT prophylaxis  - Holding SC Lovenox for now  - Ongoing monitoring for s/s bleeding  - Maintain active T&S      ENDO: No history DM of thyroid disease.  - Q4h BG and SSI Lispro per ICU protocol.     ID: Afebrile. Awaiting post op WBC. Received Amp/Gent/Unasyn during delivery and received 1 dose after delivery. Ancef 2g for Ex-lap.   - Temp q4h, wbc daily  - Ongoing monitoring for s/s infection     Lines:  - R radial arterial line (8/3)  - PIVx2  - Arguello (8/3)  - OGT (8/3)  - ETT (8/3)     Dispo: Admit to ICU. Patient seen and discussed with ICU attending Dr. Haddad.

## 2024-08-03 NOTE — ANESTHESIA PROCEDURE NOTES
Peripheral IV  Date/Time: 8/3/2024 7:04 PM      Placement  Needle size: 14 G  Laterality: left  Location: antecubital  Local anesthetic: none  Site prep: alcohol  Technique: anatomical landmarks  Attempts: 1

## 2024-08-03 NOTE — PROGRESS NOTES
Postpartum Progress Note    Assessment/Plan   Jacqueline Ambriz is a 34 y.o., , who delivered at 31w6d gestation and is now postpartum day 1 s/p rCS in setting of sPEC, on PP mag    S/p RCS  - Doing well, pain well controlled with PO meds, afebrile  - Anti-emetics PRN and bowel regimen ordered  - Continue routine postpartum care.   - Hgb 11.0 ->  -> POD#1 Hgb pending      sPEC  - s/o IV hydral 5/10, labetalol 20, IV labetalol 20/20 2200 8/2  - nifed 30->60 BID ( ), Labetalol 400 BID (8/2 PM)  - s/p lasix 20 for pulm edema on cxr and O2 req, O2 sat normal on RA, lungs CTAB  - HELLP labs neg x2  - P:C 0.45  - Mg @ 2g/hr    IAI  - Pt received Amp/Gent/Unasyn during delivery and received 1 dose after delivery  - Pt is afebrile    Dispo  - Anticipate discharge on POD#3 if continues to meet milestones  - Plan for BP check in 1 wk, incision check in 2 weeks, postpartum visit in 4-6wks    Seen and discussed with Dr. Dania Colon MD PGY-3    Principal Problem:    Indication for care in labor and delivery, antepartum ([facility])  Active Problems:    Severe pre-eclampsia in third trimester ([facility])    Gastroesophageal reflux disease    29w2d Problems (from 24 to present)       Problem Noted Resolved    Indication for care in labor and delivery, antepartum ([facility]) 2024 by Cat Chan, RN No    Priority:  Medium      Severe pre-eclampsia in third trimester ([facility]) 2024 by Elizabeth Stovall, APRN-CNP No    Priority:  Medium      Third trimester bleeding, antepartum ([facility]) 2024 by Chinyere Rivero MD No    Priority:  Medium      Multigravida of advanced maternal age in second trimester ([facility]) 2024 by Marjan Bearden MD No    Priority:  Medium      Overview Signed 2024 10:36 AM by Marjan Bearden MD     Rr cf DNA         Supervision of high risk pregnancy, antepartum ([facility]) 2024 by Marjan Bearden MD No    Priority:  Medium       Overview Addendum 7/22/2024  2:40 PM by Marjan Bearden MD     [x] Dating: LMP consistent with 7 week ultrasound  [x] Initial BMI: 32  [x] Prenatal Labs: B+ blood type, rubella equivocal, Hgb 12.2 -> 11.2 (second trimester)  [x] Pap: 2/2023: NILM/neg HPV  [x] Aneuploidy Screening: rr cfDNA, XX   [x] Baby ASA: Not indicated  [x] Anatomy US: normal  [x] 1hr GCT at 24-28wks: normal, 118  [x] Tdap (27-36wks): done  [x] Flu Shot: NA  [] COVID vaccine:   [x] Rhogam (if Rh neg): Not indicated  [] Third trimester growth:   [] GBS at 36 wks:  [x] Breastfeeding: Desires to bf  [x] PPBC: Discussed options, desires nexplanon immediately postpartum   [] 39 weeks discussion of IOL vs. Expectant management:  [x] Mode of delivery: Desires another TOLAC           Pregnancy in first trimester with history of ectopic pregnancy ([facility]) 2/3/2023 by Ita Gorman MD 3/15/2024 by Ita Gorman MD            Subjective   Her pain is well controlled with current medications  Patient resting in bed, denies any HA, visual changes, CP, SOB, RUQ pain  She is tolerating a Adult diet Regular       Objective   Allergies:   Patient has no known allergies.         Last Vitals:  Temp Pulse Resp BP MAP Pulse Ox   36.4 °C (97.5 °F) 79 18 133/89 104 96 %     Vitals Min/Max Last 24 Hours:  Temp  Min: 36.1 °C (97 °F)  Max: 36.9 °C (98.4 °F)  Pulse  Min: 77  Max: 126  Resp  Min: 16  Max: 20  BP  Min: 130/86  Max: 173/95  MAP (mmHg)  Min: 98  Max: 125    Intake/Output:     Intake/Output Summary (Last 24 hours) at 8/3/2024 0921  Last data filed at 8/3/2024 0829  Gross per 24 hour   Intake 3211.81 ml   Output 2850 ml   Net 361.81 ml       Physical Exam:  General: no acute distress  HEENT: normocephalic, atraumatic  Heart: warm and well perfused, RRR  Lungs: breathing comfortably on room air, CTAB  Abdomen: soft, nontender to palpation, fundus firm, lower abdominal bandage in place with no shadowing present  Extremities: moving all  extremities  Neuro: sleepy however easily arousable, 2+ DTR in UE  Psych: appropriate mood and affect        Lab Data:  Labs in chart were reviewed.    OB Attending Addendum:     Late Entry Note, patient evaluated multiple times throughout the course of the day.     I saw and evaluated the patient. I personally obtained the key and critical portions of the history and physical exam or was physically present for key and critical portions performed by the resident/fellow. I reviewed the resident/fellow's documentation and discussed the patient with the resident/fellow. I agree with the resident/fellow's medical decision making as documented in the note.    Summary of care throughout the day:    Patient is POD0 s/p rCS. Was receiving magnesium for seizure ppx for preeclampsia with severe features.    Developed nonspecific malaise and fatigue, mag level supratheraputic therefore was stopped. ELMIRA noted. Hb noted to be slowly downtrending. Urine output decreased as well.     Bedside ultrasound was done, this was concerning for moderate amount of simple clear fluid. Patient at that time had stable vital signs, pulse 60s-70s and normotensive to low mild range. Exam significant for marked distension, abdomen diffusely tender.     Hyperkalemic, confirmed on repeat. EKG with prolonged QT. Reviewed findings with anesthesia, treated with calcium gluconate, insulin/D50.    Repeat CBC downtrended further to 6.9. Given intraabdominal fluid and decreasing hemoglobin with decreased urine output, suspect hemoperitoneum. Discussed concern for active bleeding with patient and recommendation for operative management. Patient amenable to this. Imaging deferred given concern for active bleeding.    Will proceed to OR for exploratory laparotomy. Type and crossed for multiple products. A line placed by anesthesia, ABG drawn. Coags normal, platelets mildly decreased at 130s, stable from previous draws. Plan for likely SICU admission after  OR.    Josefina Najera MD

## 2024-08-03 NOTE — INDIVIDUALIZED OVERALL PLAN OF CARE NOTE
LATE ENTRY DUE TO PATIENT CARE    Decision for :    Patient with PPROM/IAI and sPEC on Mg, no cervical change despite nearly 12 hours of pitocin augmentation. Patient with increasing concerns for lack of labor progress. FHT intermittent category II, although overall reassuring.     Patient ultimately desires repeat  delivery.     Will be urgent repeat  delivery in the setting of IAI at 31.6wga. Anesthesia and nursing notified.     Jyoti Cunningham MD

## 2024-08-04 ENCOUNTER — APPOINTMENT (OUTPATIENT)
Dept: RADIOLOGY | Facility: HOSPITAL | Age: 34
End: 2024-08-04
Payer: COMMERCIAL

## 2024-08-04 VITALS
OXYGEN SATURATION: 96 % | DIASTOLIC BLOOD PRESSURE: 80 MMHG | BODY MASS INDEX: 36.63 KG/M2 | HEART RATE: 95 BPM | TEMPERATURE: 99.3 F | SYSTOLIC BLOOD PRESSURE: 128 MMHG | RESPIRATION RATE: 18 BRPM | WEIGHT: 194 LBS | HEIGHT: 61 IN

## 2024-08-04 LAB
ALBUMIN SERPL BCP-MCNC: 3 G/DL (ref 3.4–5)
ALBUMIN SERPL BCP-MCNC: 3.2 G/DL (ref 3.4–5)
ALP SERPL-CCNC: 75 U/L (ref 33–110)
ALT SERPL W P-5'-P-CCNC: 22 U/L (ref 7–45)
ANION GAP BLDA CALCULATED.4IONS-SCNC: 10 MMO/L (ref 10–25)
ANION GAP BLDA CALCULATED.4IONS-SCNC: 13 MMO/L (ref 10–25)
ANION GAP BLDA CALCULATED.4IONS-SCNC: 14 MMO/L (ref 10–25)
ANION GAP SERPL CALC-SCNC: 14 MMOL/L (ref 10–20)
ANION GAP SERPL CALC-SCNC: 16 MMOL/L (ref 10–20)
APTT PPP: 28 SECONDS (ref 27–38)
AST SERPL W P-5'-P-CCNC: 28 U/L (ref 9–39)
BASE EXCESS BLDA CALC-SCNC: -2.5 MMOL/L (ref -2–3)
BASE EXCESS BLDA CALC-SCNC: -4.3 MMOL/L (ref -2–3)
BASE EXCESS BLDA CALC-SCNC: -5 MMOL/L (ref -2–3)
BILIRUB DIRECT SERPL-MCNC: 0.1 MG/DL (ref 0–0.3)
BILIRUB SERPL-MCNC: 0.5 MG/DL (ref 0–1.2)
BLOOD EXPIRATION DATE: NORMAL
BODY TEMPERATURE: 37 DEGREES CELSIUS
BUN SERPL-MCNC: 25 MG/DL (ref 6–23)
BUN SERPL-MCNC: 27 MG/DL (ref 6–23)
CA-I BLDA-SCNC: 1 MMOL/L (ref 1.1–1.33)
CA-I BLDA-SCNC: 1 MMOL/L (ref 1.1–1.33)
CA-I BLDA-SCNC: 1.02 MMOL/L (ref 1.1–1.33)
CALCIUM SERPL-MCNC: 7.3 MG/DL (ref 8.6–10.6)
CALCIUM SERPL-MCNC: 7.4 MG/DL (ref 8.6–10.6)
CHLORIDE BLDA-SCNC: 101 MMOL/L (ref 98–107)
CHLORIDE BLDA-SCNC: 102 MMOL/L (ref 98–107)
CHLORIDE BLDA-SCNC: 105 MMOL/L (ref 98–107)
CHLORIDE SERPL-SCNC: 100 MMOL/L (ref 98–107)
CHLORIDE SERPL-SCNC: 103 MMOL/L (ref 98–107)
CO2 SERPL-SCNC: 20 MMOL/L (ref 21–32)
CO2 SERPL-SCNC: 21 MMOL/L (ref 21–32)
CREAT SERPL-MCNC: 1.21 MG/DL (ref 0.5–1.05)
CREAT SERPL-MCNC: 1.31 MG/DL (ref 0.5–1.05)
DISPENSE STATUS: NORMAL
EGFRCR SERPLBLD CKD-EPI 2021: 55 ML/MIN/1.73M*2
EGFRCR SERPLBLD CKD-EPI 2021: 60 ML/MIN/1.73M*2
ERYTHROCYTE [DISTWIDTH] IN BLOOD BY AUTOMATED COUNT: 15.3 % (ref 11.5–14.5)
ERYTHROCYTE [DISTWIDTH] IN BLOOD BY AUTOMATED COUNT: 15.4 % (ref 11.5–14.5)
ERYTHROCYTE [DISTWIDTH] IN BLOOD BY AUTOMATED COUNT: 15.7 % (ref 11.5–14.5)
ERYTHROCYTE [DISTWIDTH] IN BLOOD BY AUTOMATED COUNT: 15.9 % (ref 11.5–14.5)
FIBRINOGEN PPP-MCNC: 487 MG/DL (ref 200–400)
GLUCOSE BLD MANUAL STRIP-MCNC: 107 MG/DL (ref 74–99)
GLUCOSE BLD MANUAL STRIP-MCNC: 111 MG/DL (ref 74–99)
GLUCOSE BLD MANUAL STRIP-MCNC: 85 MG/DL (ref 74–99)
GLUCOSE BLD MANUAL STRIP-MCNC: 93 MG/DL (ref 74–99)
GLUCOSE BLD MANUAL STRIP-MCNC: 97 MG/DL (ref 74–99)
GLUCOSE BLDA-MCNC: 103 MG/DL (ref 74–99)
GLUCOSE BLDA-MCNC: 179 MG/DL (ref 74–99)
GLUCOSE BLDA-MCNC: 88 MG/DL (ref 74–99)
GLUCOSE SERPL-MCNC: 115 MG/DL (ref 74–99)
GLUCOSE SERPL-MCNC: 130 MG/DL (ref 74–99)
HCO3 BLDA-SCNC: 19.5 MMOL/L (ref 22–26)
HCO3 BLDA-SCNC: 19.8 MMOL/L (ref 22–26)
HCO3 BLDA-SCNC: 21.1 MMOL/L (ref 22–26)
HCT VFR BLD AUTO: 21.4 % (ref 36–46)
HCT VFR BLD AUTO: 22.4 % (ref 36–46)
HCT VFR BLD AUTO: 22.5 % (ref 36–46)
HCT VFR BLD AUTO: 25.4 % (ref 36–46)
HCT VFR BLD EST: 27 % (ref 36–46)
HCT VFR BLD EST: 28 % (ref 36–46)
HCT VFR BLD EST: 29 % (ref 36–46)
HGB BLD-MCNC: 7.8 G/DL (ref 12–16)
HGB BLD-MCNC: 8 G/DL (ref 12–16)
HGB BLD-MCNC: 8.1 G/DL (ref 12–16)
HGB BLD-MCNC: 9.1 G/DL (ref 12–16)
HGB BLDA-MCNC: 8.9 G/DL (ref 12–16)
HGB BLDA-MCNC: 9.3 G/DL (ref 12–16)
HGB BLDA-MCNC: 9.6 G/DL (ref 12–16)
INHALED O2 CONCENTRATION: 21 %
INHALED O2 CONCENTRATION: 40 %
INHALED O2 CONCENTRATION: 50 %
INR PPP: 1 (ref 0.9–1.1)
LACTATE BLDA-SCNC: 1 MMOL/L (ref 0.4–2)
LACTATE BLDA-SCNC: 1.3 MMOL/L (ref 0.4–2)
LACTATE BLDA-SCNC: 1.7 MMOL/L (ref 0.4–2)
MAGNESIUM SERPL-MCNC: 4.26 MG/DL (ref 1.6–2.4)
MAGNESIUM SERPL-MCNC: 4.75 MG/DL (ref 1.6–2.4)
MCH RBC QN AUTO: 27.1 PG (ref 26–34)
MCH RBC QN AUTO: 28 PG (ref 26–34)
MCH RBC QN AUTO: 28.1 PG (ref 26–34)
MCH RBC QN AUTO: 28.6 PG (ref 26–34)
MCHC RBC AUTO-ENTMCNC: 35.7 G/DL (ref 32–36)
MCHC RBC AUTO-ENTMCNC: 35.8 G/DL (ref 32–36)
MCHC RBC AUTO-ENTMCNC: 36 G/DL (ref 32–36)
MCHC RBC AUTO-ENTMCNC: 36.4 G/DL (ref 32–36)
MCV RBC AUTO: 75 FL (ref 80–100)
MCV RBC AUTO: 78 FL (ref 80–100)
MCV RBC AUTO: 78 FL (ref 80–100)
MCV RBC AUTO: 79 FL (ref 80–100)
NRBC BLD-RTO: 0 /100 WBCS (ref 0–0)
NRBC BLD-RTO: 0.1 /100 WBCS (ref 0–0)
NRBC BLD-RTO: 0.1 /100 WBCS (ref 0–0)
NRBC BLD-RTO: 0.2 /100 WBCS (ref 0–0)
OXYHGB MFR BLDA: 95.9 % (ref 94–98)
OXYHGB MFR BLDA: 96.1 % (ref 94–98)
OXYHGB MFR BLDA: 96.7 % (ref 94–98)
PCO2 BLDA: 31 MM HG (ref 38–42)
PCO2 BLDA: 32 MM HG (ref 38–42)
PCO2 BLDA: 33 MM HG (ref 38–42)
PH BLDA: 7.38 PH (ref 7.38–7.42)
PH BLDA: 7.4 PH (ref 7.38–7.42)
PH BLDA: 7.44 PH (ref 7.38–7.42)
PHOSPHATE SERPL-MCNC: 6.6 MG/DL (ref 2.5–4.9)
PHOSPHATE SERPL-MCNC: 7.2 MG/DL (ref 2.5–4.9)
PLATELET # BLD AUTO: 101 X10*3/UL (ref 150–450)
PLATELET # BLD AUTO: 102 X10*3/UL (ref 150–450)
PLATELET # BLD AUTO: 89 X10*3/UL (ref 150–450)
PLATELET # BLD AUTO: 99 X10*3/UL (ref 150–450)
PO2 BLDA: 103 MM HG (ref 85–95)
PO2 BLDA: 108 MM HG (ref 85–95)
PO2 BLDA: 111 MM HG (ref 85–95)
POTASSIUM BLDA-SCNC: 4.8 MMOL/L (ref 3.5–5.3)
POTASSIUM BLDA-SCNC: 5 MMOL/L (ref 3.5–5.3)
POTASSIUM BLDA-SCNC: 5.3 MMOL/L (ref 3.5–5.3)
POTASSIUM SERPL-SCNC: 4.5 MMOL/L (ref 3.5–5.3)
POTASSIUM SERPL-SCNC: 4.7 MMOL/L (ref 3.5–5.3)
PRODUCT BLOOD TYPE: 5100
PRODUCT BLOOD TYPE: 7300
PRODUCT BLOOD TYPE: 7300
PRODUCT CODE: NORMAL
PROT SERPL-MCNC: 5.1 G/DL (ref 6.4–8.2)
PROTHROMBIN TIME: 10.7 SECONDS (ref 9.8–12.8)
RBC # BLD AUTO: 2.73 X10*6/UL (ref 4–5.2)
RBC # BLD AUTO: 2.85 X10*6/UL (ref 4–5.2)
RBC # BLD AUTO: 2.99 X10*6/UL (ref 4–5.2)
RBC # BLD AUTO: 3.25 X10*6/UL (ref 4–5.2)
SAO2 % BLDA: 99 % (ref 94–100)
SODIUM BLDA-SCNC: 129 MMOL/L (ref 136–145)
SODIUM BLDA-SCNC: 130 MMOL/L (ref 136–145)
SODIUM BLDA-SCNC: 131 MMOL/L (ref 136–145)
SODIUM SERPL-SCNC: 131 MMOL/L (ref 136–145)
SODIUM SERPL-SCNC: 133 MMOL/L (ref 136–145)
UNIT ABO: NORMAL
UNIT NUMBER: NORMAL
UNIT RH: NORMAL
UNIT VOLUME: 281
UNIT VOLUME: 288
UNIT VOLUME: 328
UNIT VOLUME: 76
UNIT VOLUME: 79
WBC # BLD AUTO: 14 X10*3/UL (ref 4.4–11.3)
WBC # BLD AUTO: 15 X10*3/UL (ref 4.4–11.3)
WBC # BLD AUTO: 15 X10*3/UL (ref 4.4–11.3)
WBC # BLD AUTO: 15.3 X10*3/UL (ref 4.4–11.3)
XM INTEP: NORMAL

## 2024-08-04 PROCEDURE — 82435 ASSAY OF BLOOD CHLORIDE: CPT | Performed by: STUDENT IN AN ORGANIZED HEALTH CARE EDUCATION/TRAINING PROGRAM

## 2024-08-04 PROCEDURE — 2550000001 HC RX 255 CONTRASTS: Performed by: STUDENT IN AN ORGANIZED HEALTH CARE EDUCATION/TRAINING PROGRAM

## 2024-08-04 PROCEDURE — 97162 PT EVAL MOD COMPLEX 30 MIN: CPT | Mod: GP

## 2024-08-04 PROCEDURE — 74018 RADEX ABDOMEN 1 VIEW: CPT

## 2024-08-04 PROCEDURE — 71045 X-RAY EXAM CHEST 1 VIEW: CPT | Performed by: STUDENT IN AN ORGANIZED HEALTH CARE EDUCATION/TRAINING PROGRAM

## 2024-08-04 PROCEDURE — 74018 RADEX ABDOMEN 1 VIEW: CPT | Performed by: STUDENT IN AN ORGANIZED HEALTH CARE EDUCATION/TRAINING PROGRAM

## 2024-08-04 PROCEDURE — 82330 ASSAY OF CALCIUM: CPT | Performed by: STUDENT IN AN ORGANIZED HEALTH CARE EDUCATION/TRAINING PROGRAM

## 2024-08-04 PROCEDURE — 74174 CTA ABD&PLVS W/CONTRAST: CPT | Performed by: RADIOLOGY

## 2024-08-04 PROCEDURE — 51702 INSERT TEMP BLADDER CATH: CPT

## 2024-08-04 PROCEDURE — 82947 ASSAY GLUCOSE BLOOD QUANT: CPT

## 2024-08-04 PROCEDURE — 71045 X-RAY EXAM CHEST 1 VIEW: CPT

## 2024-08-04 PROCEDURE — C9113 INJ PANTOPRAZOLE SODIUM, VIA: HCPCS | Performed by: STUDENT IN AN ORGANIZED HEALTH CARE EDUCATION/TRAINING PROGRAM

## 2024-08-04 PROCEDURE — 99231 SBSQ HOSP IP/OBS SF/LOW 25: CPT | Performed by: SURGERY

## 2024-08-04 PROCEDURE — 84132 ASSAY OF SERUM POTASSIUM: CPT | Performed by: STUDENT IN AN ORGANIZED HEALTH CARE EDUCATION/TRAINING PROGRAM

## 2024-08-04 PROCEDURE — 37799 UNLISTED PX VASCULAR SURGERY: CPT | Performed by: STUDENT IN AN ORGANIZED HEALTH CARE EDUCATION/TRAINING PROGRAM

## 2024-08-04 PROCEDURE — 2500000005 HC RX 250 GENERAL PHARMACY W/O HCPCS: Performed by: STUDENT IN AN ORGANIZED HEALTH CARE EDUCATION/TRAINING PROGRAM

## 2024-08-04 PROCEDURE — 2500000001 HC RX 250 WO HCPCS SELF ADMINISTERED DRUGS (ALT 637 FOR MEDICARE OP)

## 2024-08-04 PROCEDURE — 85610 PROTHROMBIN TIME: CPT | Performed by: STUDENT IN AN ORGANIZED HEALTH CARE EDUCATION/TRAINING PROGRAM

## 2024-08-04 PROCEDURE — 2500000002 HC RX 250 W HCPCS SELF ADMINISTERED DRUGS (ALT 637 FOR MEDICARE OP, ALT 636 FOR OP/ED)

## 2024-08-04 PROCEDURE — 94799 UNLISTED PULMONARY SVC/PX: CPT

## 2024-08-04 PROCEDURE — 2500000004 HC RX 250 GENERAL PHARMACY W/ HCPCS (ALT 636 FOR OP/ED): Performed by: STUDENT IN AN ORGANIZED HEALTH CARE EDUCATION/TRAINING PROGRAM

## 2024-08-04 PROCEDURE — 94003 VENT MGMT INPAT SUBQ DAY: CPT

## 2024-08-04 PROCEDURE — 85384 FIBRINOGEN ACTIVITY: CPT | Performed by: STUDENT IN AN ORGANIZED HEALTH CARE EDUCATION/TRAINING PROGRAM

## 2024-08-04 PROCEDURE — 84295 ASSAY OF SERUM SODIUM: CPT | Performed by: STUDENT IN AN ORGANIZED HEALTH CARE EDUCATION/TRAINING PROGRAM

## 2024-08-04 PROCEDURE — 2500000002 HC RX 250 W HCPCS SELF ADMINISTERED DRUGS (ALT 637 FOR MEDICARE OP, ALT 636 FOR OP/ED): Performed by: STUDENT IN AN ORGANIZED HEALTH CARE EDUCATION/TRAINING PROGRAM

## 2024-08-04 PROCEDURE — 84100 ASSAY OF PHOSPHORUS: CPT | Performed by: STUDENT IN AN ORGANIZED HEALTH CARE EDUCATION/TRAINING PROGRAM

## 2024-08-04 PROCEDURE — 83605 ASSAY OF LACTIC ACID: CPT | Performed by: STUDENT IN AN ORGANIZED HEALTH CARE EDUCATION/TRAINING PROGRAM

## 2024-08-04 PROCEDURE — 83735 ASSAY OF MAGNESIUM: CPT | Performed by: STUDENT IN AN ORGANIZED HEALTH CARE EDUCATION/TRAINING PROGRAM

## 2024-08-04 PROCEDURE — 85027 COMPLETE CBC AUTOMATED: CPT | Performed by: STUDENT IN AN ORGANIZED HEALTH CARE EDUCATION/TRAINING PROGRAM

## 2024-08-04 PROCEDURE — 1210000001 HC SEMI-PRIVATE ROOM DAILY

## 2024-08-04 PROCEDURE — 82248 BILIRUBIN DIRECT: CPT | Performed by: STUDENT IN AN ORGANIZED HEALTH CARE EDUCATION/TRAINING PROGRAM

## 2024-08-04 PROCEDURE — 99233 SBSQ HOSP IP/OBS HIGH 50: CPT

## 2024-08-04 RX ORDER — LOPERAMIDE HYDROCHLORIDE 2 MG/1
4 CAPSULE ORAL EVERY 2 HOUR PRN
Status: DISCONTINUED | OUTPATIENT
Start: 2024-08-04 | End: 2024-08-05

## 2024-08-04 RX ORDER — POLYETHYLENE GLYCOL 3350 17 G/17G
17 POWDER, FOR SOLUTION ORAL 2 TIMES DAILY PRN
Status: CANCELLED | OUTPATIENT
Start: 2024-08-03

## 2024-08-04 RX ORDER — OXYTOCIN 10 [USP'U]/ML
10 INJECTION, SOLUTION INTRAMUSCULAR; INTRAVENOUS ONCE AS NEEDED
Status: CANCELLED | OUTPATIENT
Start: 2024-08-04

## 2024-08-04 RX ORDER — POTASSIUM CHLORIDE 20 MEQ/1
20 TABLET, EXTENDED RELEASE ORAL EVERY 6 HOURS PRN
Status: DISCONTINUED | OUTPATIENT
Start: 2024-08-04 | End: 2024-08-04

## 2024-08-04 RX ORDER — ACETAMINOPHEN 325 MG/1
975 TABLET ORAL EVERY 8 HOURS
Status: DISCONTINUED | OUTPATIENT
Start: 2024-08-04 | End: 2024-08-05

## 2024-08-04 RX ORDER — CALCIUM GLUCONATE 20 MG/ML
2 INJECTION, SOLUTION INTRAVENOUS EVERY 6 HOURS PRN
Status: DISCONTINUED | OUTPATIENT
Start: 2024-08-04 | End: 2024-08-04

## 2024-08-04 RX ORDER — SIMETHICONE 80 MG
80 TABLET,CHEWABLE ORAL 4 TIMES DAILY PRN
Status: CANCELLED | OUTPATIENT
Start: 2024-08-03

## 2024-08-04 RX ORDER — NEOSTIGMINE METHYLSULFATE 1 MG/ML
3 INJECTION INTRAVENOUS ONCE
Status: COMPLETED | OUTPATIENT
Start: 2024-08-04 | End: 2024-08-04

## 2024-08-04 RX ORDER — BISACODYL 10 MG/1
10 SUPPOSITORY RECTAL DAILY PRN
Status: DISCONTINUED | OUTPATIENT
Start: 2024-08-04 | End: 2024-08-05

## 2024-08-04 RX ORDER — TRANEXAMIC ACID 100 MG/ML
1000 INJECTION, SOLUTION INTRAVENOUS ONCE AS NEEDED
Status: CANCELLED | OUTPATIENT
Start: 2024-08-04

## 2024-08-04 RX ORDER — POTASSIUM CHLORIDE 14.9 MG/ML
20 INJECTION INTRAVENOUS EVERY 6 HOURS PRN
Status: DISCONTINUED | OUTPATIENT
Start: 2024-08-04 | End: 2024-08-04

## 2024-08-04 RX ORDER — HYDROMORPHONE HYDROCHLORIDE 1 MG/ML
0.5 INJECTION, SOLUTION INTRAMUSCULAR; INTRAVENOUS; SUBCUTANEOUS EVERY 4 HOURS PRN
Status: DISCONTINUED | OUTPATIENT
Start: 2024-08-04 | End: 2024-08-04

## 2024-08-04 RX ORDER — OXYTOCIN 10 [USP'U]/ML
10 INJECTION, SOLUTION INTRAMUSCULAR; INTRAVENOUS ONCE AS NEEDED
Status: CANCELLED | OUTPATIENT
Start: 2024-08-03

## 2024-08-04 RX ORDER — MAGNESIUM SULFATE HEPTAHYDRATE 40 MG/ML
2 INJECTION, SOLUTION INTRAVENOUS EVERY 6 HOURS PRN
Status: DISCONTINUED | OUTPATIENT
Start: 2024-08-04 | End: 2024-08-04

## 2024-08-04 RX ORDER — CALCIUM GLUCONATE 20 MG/ML
1 INJECTION, SOLUTION INTRAVENOUS EVERY 6 HOURS PRN
Status: DISCONTINUED | OUTPATIENT
Start: 2024-08-04 | End: 2024-08-04

## 2024-08-04 RX ORDER — POTASSIUM CHLORIDE 20 MEQ/1
40 TABLET, EXTENDED RELEASE ORAL EVERY 6 HOURS PRN
Status: DISCONTINUED | OUTPATIENT
Start: 2024-08-04 | End: 2024-08-04

## 2024-08-04 RX ORDER — GLYCOPYRROLATE 0.2 MG/ML
0.6 INJECTION INTRAMUSCULAR; INTRAVENOUS ONCE
Status: COMPLETED | OUTPATIENT
Start: 2024-08-04 | End: 2024-08-04

## 2024-08-04 RX ORDER — LABETALOL HYDROCHLORIDE 5 MG/ML
10 INJECTION, SOLUTION INTRAVENOUS EVERY 6 HOURS PRN
Status: DISCONTINUED | OUTPATIENT
Start: 2024-08-04 | End: 2024-08-05

## 2024-08-04 RX ORDER — HYDROMORPHONE HYDROCHLORIDE 1 MG/ML
0.2 INJECTION, SOLUTION INTRAMUSCULAR; INTRAVENOUS; SUBCUTANEOUS EVERY 4 HOURS PRN
Status: DISCONTINUED | OUTPATIENT
Start: 2024-08-04 | End: 2024-08-05

## 2024-08-04 RX ORDER — TRANEXAMIC ACID 100 MG/ML
1000 INJECTION, SOLUTION INTRAVENOUS ONCE AS NEEDED
Status: CANCELLED | OUTPATIENT
Start: 2024-08-03

## 2024-08-04 RX ORDER — POTASSIUM CHLORIDE 1.5 G/1.58G
40 POWDER, FOR SOLUTION ORAL EVERY 6 HOURS PRN
Status: DISCONTINUED | OUTPATIENT
Start: 2024-08-04 | End: 2024-08-04

## 2024-08-04 RX ORDER — OXYTOCIN/0.9 % SODIUM CHLORIDE 30/500 ML
60 PLASTIC BAG, INJECTION (ML) INTRAVENOUS ONCE AS NEEDED
Status: CANCELLED | OUTPATIENT
Start: 2024-08-03

## 2024-08-04 RX ORDER — POLYETHYLENE GLYCOL 3350 17 G/17G
17 POWDER, FOR SOLUTION ORAL 2 TIMES DAILY PRN
Status: CANCELLED | OUTPATIENT
Start: 2024-08-04

## 2024-08-04 RX ORDER — SIMETHICONE 80 MG
80 TABLET,CHEWABLE ORAL 4 TIMES DAILY PRN
Status: CANCELLED | OUTPATIENT
Start: 2024-08-04

## 2024-08-04 RX ORDER — OXYTOCIN/0.9 % SODIUM CHLORIDE 30/500 ML
60 PLASTIC BAG, INJECTION (ML) INTRAVENOUS ONCE AS NEEDED
Status: CANCELLED | OUTPATIENT
Start: 2024-08-04

## 2024-08-04 RX ORDER — ADHESIVE BANDAGE
10 BANDAGE TOPICAL
Status: DISCONTINUED | OUTPATIENT
Start: 2024-08-04 | End: 2024-08-05

## 2024-08-04 RX ORDER — LABETALOL HYDROCHLORIDE 5 MG/ML
10 INJECTION, SOLUTION INTRAVENOUS EVERY 4 HOURS PRN
Status: DISCONTINUED | OUTPATIENT
Start: 2024-08-04 | End: 2024-08-04

## 2024-08-04 RX ORDER — MAGNESIUM SULFATE HEPTAHYDRATE 40 MG/ML
4 INJECTION, SOLUTION INTRAVENOUS EVERY 6 HOURS PRN
Status: DISCONTINUED | OUTPATIENT
Start: 2024-08-04 | End: 2024-08-04

## 2024-08-04 RX ORDER — POTASSIUM CHLORIDE 1.5 G/1.58G
20 POWDER, FOR SOLUTION ORAL EVERY 6 HOURS PRN
Status: DISCONTINUED | OUTPATIENT
Start: 2024-08-04 | End: 2024-08-04

## 2024-08-04 RX ADMIN — ACETAMINOPHEN 975 MG: 325 TABLET ORAL at 20:52

## 2024-08-04 RX ADMIN — BISACODYL 10 MG: 10 SUPPOSITORY RECTAL at 15:17

## 2024-08-04 RX ADMIN — PROPOFOL 50 MCG/KG/MIN: 10 INJECTION, EMULSION INTRAVENOUS at 01:55

## 2024-08-04 RX ADMIN — LABETALOL HYDROCHLORIDE 400 MG: 100 TABLET, FILM COATED ORAL at 10:12

## 2024-08-04 RX ADMIN — HYDROMORPHONE HYDROCHLORIDE 0.5 MG: 1 INJECTION, SOLUTION INTRAMUSCULAR; INTRAVENOUS; SUBCUTANEOUS at 01:55

## 2024-08-04 RX ADMIN — IOHEXOL 90 ML: 350 INJECTION, SOLUTION INTRAVENOUS at 00:03

## 2024-08-04 RX ADMIN — PANTOPRAZOLE SODIUM 40 MG: 40 INJECTION, POWDER, FOR SOLUTION INTRAVENOUS at 08:46

## 2024-08-04 RX ADMIN — LABETALOL HYDROCHLORIDE 400 MG: 100 TABLET, FILM COATED ORAL at 20:04

## 2024-08-04 RX ADMIN — SODIUM ZIRCONIUM CYCLOSILICATE 10 G: 10 POWDER, FOR SUSPENSION ORAL at 01:46

## 2024-08-04 RX ADMIN — NEOSTIGMINE METHYLSULFATE 3 MG: 1 INJECTION INTRAVENOUS at 01:45

## 2024-08-04 RX ADMIN — Medication 4 L/MIN: at 03:35

## 2024-08-04 RX ADMIN — NIFEDIPINE 60 MG: 60 TABLET, EXTENDED RELEASE ORAL at 20:03

## 2024-08-04 RX ADMIN — GLYCOPYRROLATE 0.6 MG: 0.2 INJECTION, SOLUTION INTRAMUSCULAR; INTRAVENOUS at 01:45

## 2024-08-04 RX ADMIN — NIFEDIPINE 60 MG: 60 TABLET, EXTENDED RELEASE ORAL at 10:13

## 2024-08-04 ASSESSMENT — PAIN SCALES - GENERAL
PAINLEVEL_OUTOF10: 0 - NO PAIN
PAINLEVEL_OUTOF10: 4

## 2024-08-04 ASSESSMENT — COGNITIVE AND FUNCTIONAL STATUS - GENERAL
WALKING IN HOSPITAL ROOM: A LITTLE
CLIMB 3 TO 5 STEPS WITH RAILING: A LOT
MOBILITY SCORE: 17
MOVING TO AND FROM BED TO CHAIR: A LITTLE
STANDING UP FROM CHAIR USING ARMS: A LITTLE
TURNING FROM BACK TO SIDE WHILE IN FLAT BAD: A LITTLE
MOVING FROM LYING ON BACK TO SITTING ON SIDE OF FLAT BED WITH BEDRAILS: A LITTLE

## 2024-08-04 ASSESSMENT — PAIN - FUNCTIONAL ASSESSMENT
PAIN_FUNCTIONAL_ASSESSMENT: 0-10
PAIN_FUNCTIONAL_ASSESSMENT: 0-10

## 2024-08-04 ASSESSMENT — ACTIVITIES OF DAILY LIVING (ADL): ADL_ASSISTANCE: INDEPENDENT

## 2024-08-04 NOTE — SIGNIFICANT EVENT
Jenny Nuñez is a 56 LATE ENTRY DUE TO PATIENT CARE    To bedside due to decreased UOP. 9cc over last hour. 0.39 ml/kg/hr since 0700am. Patient feeling more awake since Mag discontinued however still feeling weak    General: fatigue, no acute distress  HEENT: normocephalic, atraumatic  Lungs: normal respiratory effort  Abdomen: abdominal distention significantly worse than when seen in the AM, bowel sounds present  Extremities: 1+ BLE edema present  Neuro: fatigue however able to converse and easily arousable  Psych: appropriate mood and affect    BSUS with 5cm x 5cm free fluid posterior to uterus      S/p RCS, acute abdominal distension  - Abdominal distention noted on exam,   - Hgb 11.0 ->  -> POD#1 AM 8.5 -> POD#1 PM 7.4 -> repeat labs pending  - Given downtrending Hgb, decreased UOP and free fluid on BSUS, concerned for intrabdominal injury and pending work-up would recommend proceeding to OR for Ex-lap, anesthesia and charge RN made aware  - CBC, CMP, Mg, and CTAP pending    sPEC  - s/o IV hydral 5/10, labetalol 20, IV labetalol 20/20 2200 8/2  - nifed 30->60 BID ( 7/29), Labetalol 400 BID (8/2 PM)  - s/p lasix 20 x 2 given fine crackles on exam  - Cr elevated at 1.30 (previously 0.8), repeat labs pending. Mag gtt discontinued at time of concern of Mag toxicity and Mag off. Suspect elevated Cr possible due to decreasing Hgb, will repeat CMP  - Hyperkalemia to 6.4 on labs with mild hemolysis, repeat CMP and EKG pending    Seen and discussed with senior L&D resident, Dr. Boo MD PGY-4 and Dr. Najera, L&D attending. Charge RN and anesthesia also aware. High suspicion for intraabdominal bleeding. Suspect patient will require urgent transition to the OR, concerned that patient will also need escalated ICU level care. Also discussed case with ICU JEFFERSON.     Patricia Colon MD PGY-3

## 2024-08-04 NOTE — PROGRESS NOTES
[facility]  ACUTE CARE SURGERY - PROGRESS NOTE    Patient Name: Jacqueline Ambriz  MRN: [id number]  Admit Date: 729  : 1990  AGE: 34 y.o.   GENDER: female  ==============================================================================  TODAY'S ASSESSMENT AND PLAN OF CARE:  34F, POD#2 from , acute Hgb drop this afternoon. Taken back to the OR with OB. Noted to have rectus sheath hematoma. Received 3 RBC, 2 FFP. Intra-op consult. Patient HD stable no extrav on CTA.     -c/w serial CBC while Hgb downtrending  - IR consult no plans for angioembolization at this time  - Abdominal binder    Patient will be discussed with Attending Physician Dr. Jennifer Boyle PGY4  General Surgery Service     ==============================================================================  CHIEF COMPLAINT / EVENTS LAST 24HRS / HPI:  NAEO, abdominal pain unchanged.    MEDICAL HISTORY / ROS:   Admission history and ROS reviewed. Pertinent changes as follows:      PHYSICAL EXAM:  Heart Rate:  []   Temp:  [36 °C (96.8 °F)-37.4 °C (99.3 °F)]   Resp:  [16-30]   BP: (124-151)/(62-97)   Weight:  [88 kg (194 lb 0.1 oz)]   SpO2:  [94 %-100 %]   Physical Exam  Constitutional:       Appearance: Normal appearance.   HENT:      Head: Normocephalic.      Nose: Nose normal.   Eyes:      Pupils: Pupils are equal, round, and reactive to light.   Abdominal:      Comments: Fullness and tenderness to LLQ, no significatn ecchymosis, no rebound or guarding.   Neurological:      Mental Status: She is alert.             LABS:  Results from last 7 days   Lab Units 24  0647 24  0529 24  0219 24  0516 24  1633 24  0718   WBC AUTO x10*3/uL 15.3* 15.0* 15.0*   < > 9.9 10.0   HEMOGLOBIN g/dL 8.0* 8.1* 9.1*   < > 11.0* 11.9*   HEMATOCRIT % 22.4* 22.5* 25.4*   < > 33.8* 36.5   PLATELETS AUTO x10*3/uL 101* 102* 99*   < > 131* 170   NEUTROS PCT AUTO %  --   --   --   --   67.2 69.5   LYMPHS PCT AUTO %  --   --   --   --  23.6 21.2   MONOS PCT AUTO %  --   --   --   --  7.1 7.2   EOS PCT AUTO %  --   --   --   --  0.4 0.3    < > = values in this interval not displayed.     Results from last 7 days   Lab Units 08/04/24 0219 08/03/24 2204 08/03/24  1637   APTT seconds 28 29 33   INR  1.0 1.0 1.0     Results from last 7 days   Lab Units 08/04/24  0529 08/04/24 0219 08/03/24 2204 08/03/24  1637 08/03/24  1426   SODIUM mmol/L 133* 131* 130* 126* 128*   POTASSIUM mmol/L 4.5 4.7 5.5* 6.5* 6.4*   CHLORIDE mmol/L 103 100 100 99 100   CO2 mmol/L 21 20* 19* 20* 20*   BUN mg/dL 25* 27* 30* 28* 29*   CREATININE mg/dL 1.21* 1.31* 1.56* 1.56* 1.42*   CALCIUM mg/dL 7.3* 7.4* 7.9* 6.3* 6.3*   PROTEIN TOTAL g/dL  --  5.1*  --  4.7*  4.7* 4.6*   BILIRUBIN TOTAL mg/dL  --  0.5  --  0.3  0.3 0.3   ALK PHOS U/L  --  75  --  86  86 94   ALT U/L  --  22  --  26  26 23   AST U/L  --  28  --  31  31 31   GLUCOSE mg/dL 115* 130* 124* 93 92     Results from last 7 days   Lab Units 08/04/24 0219 08/03/24  1637 08/03/24  1426   BILIRUBIN TOTAL mg/dL 0.5 0.3  0.3 0.3   BILIRUBIN DIRECT mg/dL 0.1 0.1  --      Results from last 7 days   Lab Units 08/04/24  0530 08/04/24  0256 08/04/24  0220   POCT PH, ARTERIAL pH 7.44* 7.40 7.38   POCT PCO2, ARTERIAL mm Hg 31* 32* 33*   POCT PO2, ARTERIAL mm Hg 108* 103* 111*   POCT HCO3 CALCULATED, ARTERIAL mmol/L 21.1* 19.8* 19.5*   POCT BASE EXCESS, ARTERIAL mmol/L -2.5* -4.3* -5.0*       I have reviewed all medications, laboratory results, and imaging pertinent for today's encounter.

## 2024-08-04 NOTE — PROGRESS NOTES
Physical Therapy    Physical Therapy Evaluation    Patient Name: Jacqueline Ambriz  MRN: [id number]  Today's Date: 8/4/2024   Room: 15/15A  Time Calculation  Start Time: 1040  Stop Time: 1118  Time Calculation (min): 38 min    Assessment/Plan   PT Assessment  PT Assessment Results: Decreased strength, Decreased range of motion, Decreased endurance, Impaired balance, Decreased mobility  Rehab Prognosis: Good  End of Session Communication: Bedside nurse, Physician  Assessment Comment: Pt currently requiring Jez-CGA for bed mobility, transfers and gait. Would benefit from low intensity therapy when medically stable for DC. Will continue to follow while in house.  End of Session Patient Position: Bed, 3 rail up, Alarm off, not on at start of session (sisters at bedside)  IP OR SWING BED PT PLAN  Inpatient or Swing Bed: Inpatient  PT Plan  Treatment/Interventions: Bed mobility, Transfer training, Gait training, Stair training, Balance training, Neuromuscular re-education, Strengthening, Endurance training, Range of motion, Therapeutic exercise, Therapeutic activity, Home exercise program, Positioning, Postural re-education  PT Plan: Ongoing PT  PT Frequency: 3 times per week  PT Discharge Recommendations: Low intensity level of continued care  Equipment Recommended upon Discharge: Wheeled walker  PT Recommended Transfer Status: Assist x1  PT - OK to Discharge: Yes (when medically stable for DC)      Subjective   General Visit Information:  Reason for Referral: C section on 8/2 c/b rectus sheath hematoma s/p takeback and evaluation on 8/3  Past Medical History Relevant to Rehab: PPROM, GERD  Prior to Session Communication: Bedside nurse, Physician  Patient Position Received: Bed, 3 rail up  Family/Caregiver Present: Yes  Caregiver Feedback: Sisters present and supportive throughout session  General Comment: Pt supine in bed upon PT arrival, sisters at bedside. Agreeable to PT evaluation. Pt on room air, with arguello and  a-line   Home Living:  Home Living  Type of Home: House  Lives With:  (Children 16, 13, 10; baby's father)  Home Adaptive Equipment: None  Home Layout: One level (pt denies needing to perform stairs)  Home Access: Level entry  Bathroom Shower/Tub: Tub/shower unit  Prior Level of Function:  Prior Function Per Pt/Caregiver Report  Level of [place]: Independent with ADLs and functional transfers, Independent with homemaking with ambulation  Receives Help From: Family  ADL Assistance: Independent  Homemaking Assistance: Independent  Ambulatory Assistance: Independent  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)  Leisure: enoys watching TV  Prior Function Comments: (+)drive, denies falls  Precautions:  Precautions  Medical Precautions: Fall precautions  Post-Surgical Precautions: Abdominal surgery precautions  Splinting: binder donned prior to PT arrival  Precautions Comment: Goal BP <160/110  Vital Signs:  Vital Signs  Heart Rate: 103 (132 during RN aware, pt denies dizzness, post 99)  Resp: (!) 30 (31)  SpO2: 97 % (>92% throughout session, 96% post)  BP: 127/62 (post 123/69)  MAP (mmHg): 82 (MAPs 80s-90s, Post 88)    Objective   Lines/Tubes/Drains:  Arterial Line 08/03/24 Right Radial (Active)   Number of days: 0       Urethral Catheter (Active)   Number of days: 1       Pain:  Pain Assessment  Pain Assessment: 0-10  0-10 (Numeric) Pain Score:  (reports abdominal/surgical pain with coughing; reports 3/10, does not limit session)  Cognition:  Cognition  Overall Cognitive Status: Within Functional Limits  Orientation Level: Oriented X4    Extremity/Trunk Assessments:  Strength:           RLE   RLE : Exceptions to WFL  Strength RLE  RLE Overall Strength: Greater than or equal to 3/5 as evidenced by functional mobility  LLE   LLE : Exceptions to WFL  Strength LLE  LLE Overall Strength: Greater than or equal to 3/5 as evidenced by functional mobility    General Assessments:     General Observation  General  Observation: Noted R hand and forearm edema   Activity Tolerance  Rate of Perceived Dyspnea (RPD): 0/10  Sensation  Light Touch: No apparent deficits     Static Sitting Balance  Static Sitting-Balance Support: Bilateral upper extremity supported, Feet supported  Static Sitting-Level of Assistance: Contact guard  Static Sitting-Comment/Number of Minutes: x8 minutes (cues for UE/LE placement, breathing, upright posture)  Static Standing Balance  Static Standing-Balance Support: Bilateral upper extremity supported  Static Standing-Level of Assistance: Contact guard  Static Standing-Comment/Number of Minutes: x2 minutes (forward flexed posture, cues for UE/LE placement, sequencing, breathing, proper FWW use)    Functional Assessments:  Bed Mobility  Bed Mobility: Yes  Bed Mobility 1  Bed Mobility 1: Supine to sitting  Level of Assistance 1: Minimum assistance  Bed Mobility Comments 1: HOB elevated, bed rails utilize (cues for logroll, breathing, sequencing)  Bed Mobility 2  Bed Mobility  2: Sitting to supine  Level of Assistance 2: Moderate assistance  Bed Mobility Comments 2: cues for sequencing, logroll technique, breathing; assist with BLEs  Transfers  Transfer: Yes  Transfer 1  Transfer From 1: Sit to, Stand to  Transfer to 1: Sit, Stand  Technique 1: Sit to stand, Stand to sit  Transfer Device 1: Walker  Transfer Level of Assistance 1: Minimum assistance  Trials/Comments 1: cues for UE, LE placement, sequencing with FWW, breathing  Ambulation/Gait Training  Ambulation/Gait Training Performed: Yes  Ambulation/Gait Training 1  Surface 1: Level tile  Device 1: Rolling walker  Assistance 1: Minimum assistance  Quality of Gait 1: Wide base of support, Forward flexed posture (decreased step height and step length)  Comments/Distance (ft) 1: x3ft towards HOB (cues for upright posture, forward head positioning, sequencing with FWW)  Stairs  Stairs: No       Outcome Measures:  [facility] Basic Mobility  Turning from your back to  your side while in a flat bed without using bedrails: A little  Moving from lying on your back to sitting on the side of a flat bed without using bedrails: A little  Moving to and from bed to chair (including a wheelchair): A little  Standing up from a chair using your arms (e.g. wheelchair or bedside chair): A little  To walk in hospital room: A little  Climbing 3-5 steps with railing: A lot  Basic Mobility - Total Score: 17           FSS-ICU  Ambulation: Walks <50 feet with any assistance x1 or walks any distance with assistance x2 people  Rolling: Minimal assistance (performs 75% or more of task)  Sitting: Minimal assistance (performs 75% or more of task)  Transfer Sit-to-Stand: Minimal assistance (performs 75% or more of task)  Transfer Supine-to-Sit: Minimal assistance (performs 75% or more of task)  Total Score: 17                   Encounter Problems       Encounter Problems (Active)       PT Problem       Pt will be IND with bed mobility  (Progressing)       Start:  08/04/24    Expected End:  08/18/24            Pt will be IND with sit<>stand  (Progressing)       Start:  08/04/24    Expected End:  08/18/24            Pt will ambulate >/= 150ft with LRAD and SBA (Progressing)       Start:  08/04/24    Expected End:  08/18/24            Pt will stand x1 minute without UE support and SBA  (Progressing)       Start:  08/04/24    Expected End:  08/18/24            Pt will ascend/descend >/= 1 curb step without UE support with SBA  (Progressing)       Start:  08/04/24    Expected End:  08/18/24                   Education Documentation  Precautions, taught by Ines Jackson PT at 8/4/2024 12:29 PM.  Learner: Family, Patient  Readiness: Acceptance  Method: Explanation  Response: Verbalizes Understanding, Demonstrated Understanding    Body Mechanics, taught by Ines Jackson PT at 8/4/2024 12:29 PM.  Learner: Family, Patient  Readiness: Acceptance  Method: Explanation  Response: Verbalizes Understanding,  Demonstrated Understanding    Home Exercise Program, taught by Ines Jackson PT at 8/4/2024 12:29 PM.  Learner: Family, Patient  Readiness: Acceptance  Method: Explanation  Response: Verbalizes Understanding, Demonstrated Understanding    Mobility Training, taught by Ines Jackson PT at 8/4/2024 12:29 PM.  Learner: Family, Patient  Readiness: Acceptance  Method: Explanation  Response: Verbalizes Understanding, Demonstrated Understanding    Education Comments  No comments found.        08/04/24 at 12:30 PM   Ines Jackson PT   Rehab Office: [phone]

## 2024-08-04 NOTE — SIGNIFICANT EVENT
"Postoperative Check    Subjective  Patient intubated and sedated. Able to answer questions by nodding and shaking head. Denies pain currently.    Objective  /87   Pulse 76   Temp 36 °C (96.8 °F)   Resp 18   Ht 1.549 m (5' 1\")   Wt 87.8 kg (193 lb 9 oz)   LMP 2023   SpO2 97%   Breastfeeding Yes   BMI 36.57 kg/m²   Gen: intubated, sedated  Head: normocephalic, atraumatic. OGT in place  CV: normal rate, regular rhythm  Lungs: CTAB, intermittently coarse  Abdomen: markedly distended and firm. Nontender to palpation. Pfannenstiel incision covered with pressure dressing without strikethrough.   : arguello in place draining yellow urine  Skin: warm, dry  Neuro: awake, responsive    Results from last 7 days   Lab Units 24  02124  22024  1637   WBC AUTO x10*3/uL 15.0* 15.1* 15.2*   HEMOGLOBIN g/dL 9.1* 8.5* 6.9*   HEMATOCRIT % 25.4* 23.4* 20.8*   PLATELETS AUTO x10*3/uL 99* 98* 138*   PROTIME seconds 10.7 11.0 11.3   INR  1.0 1.0 1.0   FIBRINOGEN mg/dL 487* 397 471*      Results from last 7 days   Lab Units 24  02124  1637 24  1426 24  1201   WBC AUTO x10*3/uL 15.0* 15.1* 15.2*  --  14.6*   HEMOGLOBIN g/dL 9.1* 8.5* 6.9*  --  7.4*   HEMATOCRIT % 25.4* 23.4* 20.8*  --  22.9*   PLATELETS AUTO x10*3/uL 99* 98* 138*  --  140*   AST U/L  --   --  31  31 31 26   ALT U/L  --   --  26  26 23 25   CREATININE mg/dL  --  1.56* 1.56* 1.42* 1.30*       Assessment/Plan  33yo  now POD2 s/p rCS and POD1 s/p takeback and evaluation of rectus sheath hematoma in the setting of postoperative drop in hemoglobin, ELMIRA and hyperkalemia. Currently admitted to SICU postoperatively intubated and sedated and stable.     Rectus Sheath Hematoma  Postoperative Care  Noted in OR on 8/3 to be 6x6cm nonexpanding left rectus sheath hematoma with 400mL hemoperitoneum evacuated  ACS consulted intraoperatively, recommended observation and serial hemoglobins with possible " IR embolization   IR aware. CTA without evidence of active extravasation, deferring inferior epigastric embolization at this time  S/p 3u pRBC and 2u FFP intraoperatively. Hgb stable at 9.1 > 9.3, Platelets stable at 98 > 99, fibrinogen 487  Goal Hgb >7, Platelets >50k, Fibrinogen >300  UOP adequate, metabolic acidosis resolving  OG tube in place. Given abdominal distension and reoperation, high risk of postoperative ileus. Keep NPO now and will advance diet slowly once extubated  Appreciate SICU care  Will continue serial abdominal exams and serial labs. No current indication for IR embolization, though low threshold to re-engage IR if clinical worsening    Pre-eclampsia with severe features  Goal blood pressures postpartum<160/110  Can administer IV labetalol 20mg, hydralazine 5mg for acute hypertension  Current regimen: nifedipine 60mg BID, labetalol 400mg BID (currently held). Recommend restarting once BPs have been stable  S/p 24 hours of postpartum magnesium for seizure prophylaxis. If seizure noted while in SICU, recommend 6g bolus of IV magnesium.  Labs notable for ELMIRA as below    Hyperkalemia  ELMIRA  Hyperkalemia improving, most recently 5.0 (as high as 6.5, s/p calcium gluconate, insulin, lokelma)  Cr rising, most recently 1.56. No evidence of bladder injury during takeback. Suspect prerenal injury secondary to blood loss and hemoperitoneum  EKG with prolonged QT, no T wave abnormalities  Continue mIVF and serial labs to monitor  Continue to monitor UOP, most recently 3.4mL/kg/hr    Postpartum care  Breast pump at bedside, lactation consultation as needed  Baby in NICU    Discussed with Dr. Xuan Bolton MD  PGY-4, Obstetrics and Gynecology

## 2024-08-04 NOTE — PROGRESS NOTES
Jacqueline Ambriz is a 34 y.o. female on day 6 of admission presenting with Indication for care in labor and delivery, antepartum ([facility]).    Subjective   Jacqueline Ambriz is a 34 y.o.  female diagnosed with preeclampsia with severe features, s/p repeat  on () with EBL ~900cc, and s/p OR take back with OB on (8/3) for post-op anemia, large Left rectus sheath hematoma 6 x 6 cm (~500cc) without active expansion, and approx 400 ml hemoperitoneum evacuation without active bleeding.   Received 3u pRBC and 2u FFP intra-op.     ACS consulted intra-op, did not recommend surgical exploration since no active bleeding.  Suggested CT angio and IR consult for embolization, however CTA was unremarkable and IR did not recommend embolization.  Intra-op bladder was backfilled with sterile milk to rule out extravasation and injury.  Patient brought to SICU intubated and sedated, ICU care for suspected abdominal bleeding, hyperkalemia (6.5), hypermagnesemia and toxicity (Mag 8.9 supratheraputic), and [place] with decreased urine output with increase Cr (1.56).      Patient extubated overnight now on room air, off sedation pain well controlled, morning labs potassium 4.5 magnesium 4.26, Cr 1.2, LFTs normal, UOP increased to 200-300 ml/hr.  S/p transfusion Hgb this AM 8.1 and 8.0 on repeat CBC.     Patient doing well this morning, says she has some pelvic cramping but pain is tolerable.  Denies SOB, chest pain, dizziness, active vaginal bleeding, headache.    - around 11am patient's right arm with noted edema 3+ which is a change from AM rounds.  IV fluids in Right AC discontinued and IV removed. Right radial A-line to be removed today. Patient denies pain, says she noticed the swelling over the last hour.        Objective     Physical Exam  Vitals reviewed.   Constitutional:       General: She is not in acute distress.     Appearance: Normal appearance.   HENT:      Head: Normocephalic and atraumatic.       "Mouth/Throat:      Mouth: Mucous membranes are moist.   Eyes:      Conjunctiva/sclera: Conjunctivae normal.   Cardiovascular:      Rate and Rhythm: Regular rhythm. Tachycardia present.   Pulmonary:      Effort: Pulmonary effort is normal. No respiratory distress.      Comments: Room air  Abdominal:      Tenderness: There is abdominal tenderness.   Musculoskeletal:      Right forearm: Swelling and edema present.      Right hand: Swelling present. No tenderness. Normal strength.      Cervical back: Neck supple.      Right lower leg: Edema present.      Left lower leg: Edema present.   Skin:     General: Skin is warm and dry.   Neurological:      General: No focal deficit present.      Mental Status: She is alert and oriented to person, place, and time.   Psychiatric:         Mood and Affect: Mood normal.         Behavior: Behavior normal.         Last Recorded Vitals  Blood pressure 134/82, pulse 83, temperature 37.1 °C (98.8 °F), temperature source Temporal, resp. rate 19, height 1.549 m (5' 1\"), weight 88 kg (194 lb 0.1 oz), last menstrual period 12/23/2023, SpO2 99%, currently breastfeeding.  Intake/Output last 3 Shifts:  I/O last 3 completed shifts:  In: 7578.9 (86.1 mL/kg) [I.V.:4279.9 (48.6 mL/kg); Blood:1650; IV Piggyback:1649]  Out: 6837 (77.7 mL/kg) [Urine:3882 (1.2 mL/kg/hr); Blood:2955]  Weight: 88 kg     Relevant Results  Scheduled medications  calcium gluconate, 2 g, intravenous, Once  [Held by provider] enoxaparin, 40 mg, subcutaneous, q24h  pantoprazole, 40 mg, oral, Daily before breakfast   Or  esomeprazole, 40 mg, nasoduodenal tube, Daily before breakfast   Or  pantoprazole, 40 mg, intravenous, Daily before breakfast  [Held by provider] famotidine, 20 mg, oral, BID  insulin lispro, 0-5 Units, subcutaneous, q4h  [Held by provider] labetalol, 400 mg, oral, BID  [Held by provider] NIFEdipine ER, 60 mg, oral, q12h  oxygen, , inhalation, Continuous - Inhalation  [Held by provider] prenatal vitamin " (iron-folic), 1 tablet, oral, Daily  sodium zirconium cyclosilicate, 10 g, oral, q8h      Continuous medications  [Held by provider] magnesium sulfate, 2 g/hr, Last Rate: Stopped (08/03/24 1158)  propofol, 5-50 mcg/kg/min, Last Rate: 50 mcg/kg/min (08/04/24 0155)      PRN medications  PRN medications: [Held by provider] benzocaine-menthoL, [Held by provider] bisacodyl, calcium gluconate, calcium gluconate, calcium gluconate, calcium gluconate, calcium gluconate, HYDROmorphone, HYDROmorphone, labetaloL, lanolin, lidocaine, [Held by provider] loperamide, [Held by provider] magnesium hydroxide, magnesium sulfate, magnesium sulfate, magnesium sulfate, magnesium sulfate, measles, mumps and rubella, [Held by provider] miSOPROStoL, naloxone, [Held by provider] NIFEdipine, ondansetron **OR** ondansetron, polyethylene glycol, potassium chloride CR **OR** potassium chloride, potassium chloride CR **OR** potassium chloride, potassium chloride, psyllium, simethicone, witch hazel  Results for orders placed or performed during the hospital encounter of 07/29/24 (from the past 24 hour(s))   Comprehensive metabolic panel   Result Value Ref Range    Glucose 94 74 - 99 mg/dL    Sodium 129 (L) 136 - 145 mmol/L    Potassium 5.6 (H) 3.5 - 5.3 mmol/L    Chloride 101 98 - 107 mmol/L    Bicarbonate 21 21 - 32 mmol/L    Anion Gap 13 10 - 20 mmol/L    Urea Nitrogen 26 (H) 6 - 23 mg/dL    Creatinine 1.30 (H) 0.50 - 1.05 mg/dL    eGFR 55 (L) >60 mL/min/1.73m*2    Calcium 6.4 (L) 8.6 - 10.6 mg/dL    Albumin 2.7 (L) 3.4 - 5.0 g/dL    Alkaline Phosphatase 104 33 - 110 U/L    Total Protein 5.0 (L) 6.4 - 8.2 g/dL    AST 26 9 - 39 U/L    Bilirubin, Total 0.3 0.0 - 1.2 mg/dL    ALT 25 7 - 45 U/L   Magnesium   Result Value Ref Range    Magnesium 8.92 (HH) 1.60 - 2.40 mg/dL   CBC   Result Value Ref Range    WBC 14.6 (H) 4.4 - 11.3 x10*3/uL    nRBC 0.0 0.0 - 0.0 /100 WBCs    RBC 2.86 (L) 4.00 - 5.20 x10*6/uL    Hemoglobin 7.4 (L) 12.0 - 16.0 g/dL     Hematocrit 22.9 (L) 36.0 - 46.0 %    MCV 80 80 - 100 fL    MCH 25.9 (L) 26.0 - 34.0 pg    MCHC 32.3 32.0 - 36.0 g/dL    RDW 16.7 (H) 11.5 - 14.5 %    Platelets 140 (L) 150 - 450 x10*3/uL   Magnesium   Result Value Ref Range    Magnesium 8.31 (HH) 1.60 - 2.40 mg/dL   Comprehensive metabolic panel   Result Value Ref Range    Glucose 92 74 - 99 mg/dL    Sodium 128 (L) 136 - 145 mmol/L    Potassium 6.4 (HH) 3.5 - 5.3 mmol/L    Chloride 100 98 - 107 mmol/L    Bicarbonate 20 (L) 21 - 32 mmol/L    Anion Gap 14 10 - 20 mmol/L    Urea Nitrogen 29 (H) 6 - 23 mg/dL    Creatinine 1.42 (H) 0.50 - 1.05 mg/dL    eGFR 50 (L) >60 mL/min/1.73m*2    Calcium 6.3 (L) 8.6 - 10.6 mg/dL    Albumin 2.5 (L) 3.4 - 5.0 g/dL    Alkaline Phosphatase 94 33 - 110 U/L    Total Protein 4.6 (L) 6.4 - 8.2 g/dL    AST 31 9 - 39 U/L    Bilirubin, Total 0.3 0.0 - 1.2 mg/dL    ALT 23 7 - 45 U/L   Comprehensive metabolic panel   Result Value Ref Range    Glucose 93 74 - 99 mg/dL    Sodium 126 (L) 136 - 145 mmol/L    Potassium 6.5 (HH) 3.5 - 5.3 mmol/L    Chloride 99 98 - 107 mmol/L    Bicarbonate 20 (L) 21 - 32 mmol/L    Anion Gap 14 10 - 20 mmol/L    Urea Nitrogen 28 (H) 6 - 23 mg/dL    Creatinine 1.56 (H) 0.50 - 1.05 mg/dL    eGFR 44 (L) >60 mL/min/1.73m*2    Calcium 6.3 (L) 8.6 - 10.6 mg/dL    Albumin 2.7 (L) 3.4 - 5.0 g/dL    Alkaline Phosphatase 86 33 - 110 U/L    Total Protein 4.7 (L) 6.4 - 8.2 g/dL    AST 31 9 - 39 U/L    Bilirubin, Total 0.3 0.0 - 1.2 mg/dL    ALT 26 7 - 45 U/L   CBC   Result Value Ref Range    WBC 15.2 (H) 4.4 - 11.3 x10*3/uL    nRBC 0.1 (H) 0.0 - 0.0 /100 WBCs    RBC 2.53 (L) 4.00 - 5.20 x10*6/uL    Hemoglobin 6.9 (L) 12.0 - 16.0 g/dL    Hematocrit 20.8 (L) 36.0 - 46.0 %    MCV 82 80 - 100 fL    MCH 27.3 26.0 - 34.0 pg    MCHC 33.2 32.0 - 36.0 g/dL    RDW 16.8 (H) 11.5 - 14.5 %    Platelets 138 (L) 150 - 450 x10*3/uL   Fibrinogen   Result Value Ref Range    Fibrinogen 471 (H) 200 - 400 mg/dL   Coagulation Screen   Result Value  Ref Range    Protime 11.3 9.8 - 12.8 seconds    INR 1.0 0.9 - 1.1    aPTT 33 27 - 38 seconds   Hepatic function panel   Result Value Ref Range    Albumin 2.7 (L) 3.4 - 5.0 g/dL    Bilirubin, Total 0.3 0.0 - 1.2 mg/dL    Bilirubin, Direct 0.1 0.0 - 0.3 mg/dL    Alkaline Phosphatase 86 33 - 110 U/L    ALT 26 7 - 45 U/L    AST 31 9 - 39 U/L    Total Protein 4.7 (L) 6.4 - 8.2 g/dL   Phosphorus   Result Value Ref Range    Phosphorus 8.1 (H) 2.5 - 4.9 mg/dL   Prepare Cryoprecipitated AHF (Pooled Units): 2 Pools   Result Value Ref Range    PRODUCT CODE G6739K52     Unit Number [id number]     Unit ABO B     Unit RH POS     Dispense Status RE     Blood Expiration Date 8/3/2024 11:34:00 PM EDT     PRODUCT BLOOD TYPE 7300     UNIT VOLUME 79     PRODUCT CODE D6160Z14     Unit Number [id number]     Unit ABO B     Unit RH POS     Dispense Status RE     Blood Expiration Date 8/3/2024 11:34:00 PM EDT     PRODUCT BLOOD TYPE 7300     UNIT VOLUME 76    Blood Gas Arterial Full Panel   Result Value Ref Range    POCT pH, Arterial 7.35 (L) 7.38 - 7.42 pH    POCT pCO2, Arterial 30 (L) 38 - 42 mm Hg    POCT pO2, Arterial 115 (H) 85 - 95 mm Hg    POCT SO2, Arterial 98 94 - 100 %    POCT Oxy Hemoglobin, Arterial 97.4 94.0 - 98.0 %    POCT Hematocrit Calculated, Arterial 20.0 (L) 36.0 - 46.0 %    POCT Sodium, Arterial 124 (L) 136 - 145 mmol/L    POCT Potassium, Arterial 6.7 (HH) 3.5 - 5.3 mmol/L    POCT Chloride, Arterial      POCT Ionized Calcium, Arterial 0.91 (L) 1.10 - 1.33 mmol/L    POCT Glucose, Arterial 107 (H) 74 - 99 mg/dL    POCT Lactate, Arterial 1.1 0.4 - 2.0 mmol/L    POCT Base Excess, Arterial -8.2 (L) -2.0 - 3.0 mmol/L    POCT HCO3 Calculated, Arterial 16.6 (L) 22.0 - 26.0 mmol/L    POCT Hemoglobin, Arterial 6.8 (L) 12.0 - 16.0 g/dL    POCT Anion Gap, Arterial      Patient Temperature 37.0 degrees Celsius    FiO2 21 %   Prepare RBC: 3 Units   Result Value Ref Range    PRODUCT CODE F2286X71     Unit Number  [id number]     Unit ABO O     Unit RH POS     XM INTEP COMP     Dispense Status TR     Blood Expiration Date 8/28/2024 11:59:00 PM EDT     PRODUCT BLOOD TYPE 5100     UNIT VOLUME 281     PRODUCT CODE H5330N20     Unit Number [id number]     Unit ABO O     Unit RH POS     XM INTEP COMP     Dispense Status TR     Blood Expiration Date 8/30/2024 11:59:00 PM EDT     PRODUCT BLOOD TYPE 5100     UNIT VOLUME 288     PRODUCT CODE Y2420D65     Unit Number [id number]     Unit ABO O     Unit RH POS     XM INTEP COMP     Dispense Status XM     Blood Expiration Date 8/24/2024 11:59:00 PM EDT     PRODUCT BLOOD TYPE 5100     UNIT VOLUME 328    Prepare Plasma: 1 Units   Result Value Ref Range    PRODUCT CODE C2339Y49     Unit Number [id number]     Unit ABO AB     Unit RH POS     Dispense Status TR     Blood Expiration Date 8/8/2024  3:32:00 AM EDT     PRODUCT BLOOD TYPE 8400     UNIT VOLUME 197    Prepare Plasma: 1 Units   Result Value Ref Range    PRODUCT CODE W3064E98     Unit Number [id number]     Unit ABO AB     Unit RH POS     Dispense Status TR     Blood Expiration Date 8/8/2024  3:32:00 AM EDT     PRODUCT BLOOD TYPE 8400     UNIT VOLUME 197    BLOOD GAS ARTERIAL FULL PANEL   Result Value Ref Range    POCT pH, Arterial 7.25 (LL) 7.38 - 7.42 pH    POCT pCO2, Arterial 39 38 - 42 mm Hg    POCT pO2, Arterial 174 (H) 85 - 95 mm Hg    POCT SO2, Arterial 98 94 - 100 %    POCT Oxy Hemoglobin, Arterial 97.0 94.0 - 98.0 %    POCT Hematocrit Calculated, Arterial 16.0 (L) 36.0 - 46.0 %    POCT Sodium, Arterial 124 (L) 136 - 145 mmol/L    POCT Potassium, Arterial 5.6 (H) 3.5 - 5.3 mmol/L    POCT Chloride, Arterial 98 98 - 107 mmol/L    POCT Ionized Calcium, Arterial 1.32 1.10 - 1.33 mmol/L    POCT Glucose, Arterial 286 (H) 74 - 99 mg/dL    POCT Lactate, Arterial 2.0 0.4 - 2.0 mmol/L    POCT Base Excess, Arterial -9.3 (L) -2.0 - 3.0 mmol/L    POCT HCO3 Calculated, Arterial 17.1 (L) 22.0 - 26.0 mmol/L    POCT  Hemoglobin, Arterial 5.4 (LL) 12.0 - 16.0 g/dL    POCT Anion Gap, Arterial 15 10 - 25 mmo/L    Patient Temperature 37.0 degrees Celsius    FiO2 21 %   Blood Gas Arterial Full Panel   Result Value Ref Range    POCT pH, Arterial 7.24 (LL) 7.38 - 7.42 pH    POCT pCO2, Arterial 38 38 - 42 mm Hg    POCT pO2, Arterial 124 (H) 85 - 95 mm Hg    POCT SO2, Arterial 100 94 - 100 %    POCT Oxy Hemoglobin, Arterial 97.4 94.0 - 98.0 %    POCT Hematocrit Calculated, Arterial 28.0 (L) 36.0 - 46.0 %    POCT Sodium, Arterial 124 (L) 136 - 145 mmol/L    POCT Potassium, Arterial 5.3 3.5 - 5.3 mmol/L    POCT Chloride, Arterial 99 98 - 107 mmol/L    POCT Ionized Calcium, Arterial 0.98 (L) 1.10 - 1.33 mmol/L    POCT Glucose, Arterial 112 (H) 74 - 99 mg/dL    POCT Lactate, Arterial 2.0 0.4 - 2.0 mmol/L    POCT Base Excess, Arterial -10.3 (L) -2.0 - 3.0 mmol/L    POCT HCO3 Calculated, Arterial 16.3 (L) 22.0 - 26.0 mmol/L    POCT Hemoglobin, Arterial 9.3 (L) 12.0 - 16.0 g/dL    POCT Anion Gap, Arterial 14 10 - 25 mmo/L    Patient Temperature 37.0 degrees Celsius    FiO2 21 %   BLOOD GAS ARTERIAL FULL PANEL   Result Value Ref Range    POCT pH, Arterial 7.33 (L) 7.38 - 7.42 pH    POCT pCO2, Arterial 31 (L) 38 - 42 mm Hg    POCT pO2, Arterial 132 (H) 85 - 95 mm Hg    POCT SO2, Arterial 99 94 - 100 %    POCT Oxy Hemoglobin, Arterial 96.9 94.0 - 98.0 %    POCT Hematocrit Calculated, Arterial 25.0 (L) 36.0 - 46.0 %    POCT Sodium, Arterial 125 (L) 136 - 145 mmol/L    POCT Potassium, Arterial 5.2 3.5 - 5.3 mmol/L    POCT Chloride, Arterial 101 98 - 107 mmol/L    POCT Ionized Calcium, Arterial 1.05 (L) 1.10 - 1.33 mmol/L    POCT Glucose, Arterial 87 74 - 99 mg/dL    POCT Lactate, Arterial 1.6 0.4 - 2.0 mmol/L    POCT Base Excess, Arterial -8.7 (L) -2.0 - 3.0 mmol/L    POCT HCO3 Calculated, Arterial 16.3 (L) 22.0 - 26.0 mmol/L    POCT Hemoglobin, Arterial 8.2 (L) 12.0 - 16.0 g/dL    POCT Anion Gap, Arterial 13 10 - 25 mmo/L    Patient Temperature  37.0 degrees Celsius    FiO2 50 %   BLOOD GAS ARTERIAL FULL PANEL   Result Value Ref Range    POCT pH, Arterial 7.32 (L) 7.38 - 7.42 pH    POCT pCO2, Arterial 32 (L) 38 - 42 mm Hg    POCT pO2, Arterial 125 (H) 85 - 95 mm Hg    POCT SO2, Arterial 99 94 - 100 %    POCT Oxy Hemoglobin, Arterial 96.2 94.0 - 98.0 %    POCT Hematocrit Calculated, Arterial 23.0 (L) 36.0 - 46.0 %    POCT Sodium, Arterial 127 (L) 136 - 145 mmol/L    POCT Potassium, Arterial 5.6 (H) 3.5 - 5.3 mmol/L    POCT Chloride, Arterial      POCT Ionized Calcium, Arterial 1.11 1.10 - 1.33 mmol/L    POCT Glucose, Arterial 107 (H) 74 - 99 mg/dL    POCT Lactate, Arterial 1.7 0.4 - 2.0 mmol/L    POCT Base Excess, Arterial -8.7 (L) -2.0 - 3.0 mmol/L    POCT HCO3 Calculated, Arterial 16.5 (L) 22.0 - 26.0 mmol/L    POCT Hemoglobin, Arterial 7.8 (L) 12.0 - 16.0 g/dL    POCT Anion Gap, Arterial      Patient Temperature 37.0 degrees Celsius    FiO2 50 %   BLOOD GAS ARTERIAL FULL PANEL   Result Value Ref Range    POCT pH, Arterial 7.33 (L) 7.38 - 7.42 pH    POCT pCO2, Arterial 32 (L) 38 - 42 mm Hg    POCT pO2, Arterial 116 (H) 85 - 95 mm Hg    POCT SO2, Arterial 100 94 - 100 %    POCT Oxy Hemoglobin, Arterial 97.4 94.0 - 98.0 %    POCT Hematocrit Calculated, Arterial 22.0 (L) 36.0 - 46.0 %    POCT Sodium, Arterial 127 (L) 136 - 145 mmol/L    POCT Potassium, Arterial 5.4 (H) 3.5 - 5.3 mmol/L    POCT Chloride, Arterial 100 98 - 107 mmol/L    POCT Ionized Calcium, Arterial 1.08 (L) 1.10 - 1.33 mmol/L    POCT Glucose, Arterial 160 (H) 74 - 99 mg/dL    POCT Lactate, Arterial 1.8 0.4 - 2.0 mmol/L    POCT Base Excess, Arterial -8.2 (L) -2.0 - 3.0 mmol/L    POCT HCO3 Calculated, Arterial 16.9 (L) 22.0 - 26.0 mmol/L    POCT Hemoglobin, Arterial 7.2 (L) 12.0 - 16.0 g/dL    POCT Anion Gap, Arterial 16 10 - 25 mmo/L    Patient Temperature 37.0 degrees Celsius    FiO2 50 %   Calcium, Ionized   Result Value Ref Range    POCT Calcium, Ionized 1.04 (L) 1.1 - 1.33 mmol/L    Magnesium   Result Value Ref Range    Magnesium 6.41 (HH) 1.60 - 2.40 mg/dL   Coagulation Screen   Result Value Ref Range    Protime 11.0 9.8 - 12.8 seconds    INR 1.0 0.9 - 1.1    aPTT 29 27 - 38 seconds   CBC   Result Value Ref Range    WBC 15.1 (H) 4.4 - 11.3 x10*3/uL    nRBC 0.1 (H) 0.0 - 0.0 /100 WBCs    RBC 3.00 (L) 4.00 - 5.20 x10*6/uL    Hemoglobin 8.5 (L) 12.0 - 16.0 g/dL    Hematocrit 23.4 (L) 36.0 - 46.0 %    MCV 78 (L) 80 - 100 fL    MCH 28.3 26.0 - 34.0 pg    MCHC 36.3 (H) 32.0 - 36.0 g/dL    RDW 15.6 (H) 11.5 - 14.5 %    Platelets 98 (L) 150 - 450 x10*3/uL   Renal Function Panel   Result Value Ref Range    Glucose 124 (H) 74 - 99 mg/dL    Sodium 130 (L) 136 - 145 mmol/L    Potassium 5.5 (H) 3.5 - 5.3 mmol/L    Chloride 100 98 - 107 mmol/L    Bicarbonate 19 (L) 21 - 32 mmol/L    Anion Gap 17 10 - 20 mmol/L    Urea Nitrogen 30 (H) 6 - 23 mg/dL    Creatinine 1.56 (H) 0.50 - 1.05 mg/dL    eGFR 44 (L) >60 mL/min/1.73m*2    Calcium 7.9 (L) 8.6 - 10.6 mg/dL    Phosphorus 9.4 (H) 2.5 - 4.9 mg/dL    Albumin 3.2 (L) 3.4 - 5.0 g/dL   Fibrinogen   Result Value Ref Range    Fibrinogen 397 200 - 400 mg/dL   Blood Gas Arterial Full Panel   Result Value Ref Range    POCT pH, Arterial 7.27 (L) 7.38 - 7.42 pH    POCT pCO2, Arterial 41 38 - 42 mm Hg    POCT pO2, Arterial 68 (L) 85 - 95 mm Hg    POCT SO2, Arterial 94 94 - 100 %    POCT Oxy Hemoglobin, Arterial 91.2 (L) 94.0 - 98.0 %    POCT Hematocrit Calculated, Arterial 27.0 (L) 36.0 - 46.0 %    POCT Sodium, Arterial 126 (L) 136 - 145 mmol/L    POCT Potassium, Arterial 5.7 (H) 3.5 - 5.3 mmol/L    POCT Chloride, Arterial 101 98 - 107 mmol/L    POCT Ionized Calcium, Arterial 1.11 1.10 - 1.33 mmol/L    POCT Glucose, Arterial 127 (H) 74 - 99 mg/dL    POCT Lactate, Arterial 1.7 0.4 - 2.0 mmol/L    POCT Base Excess, Arterial -7.6 (L) -2.0 - 3.0 mmol/L    POCT HCO3 Calculated, Arterial 18.8 (L) 22.0 - 26.0 mmol/L    POCT Hemoglobin, Arterial 9.0 (L) 12.0 - 16.0 g/dL    POCT  Anion Gap, Arterial 12 10 - 25 mmo/L    Patient Temperature 37.0 degrees Celsius    FiO2 50 %   Hepatic function panel   Result Value Ref Range    Albumin 3.2 (L) 3.4 - 5.0 g/dL    Bilirubin, Total 0.5 0.0 - 1.2 mg/dL    Bilirubin, Direct 0.1 0.0 - 0.3 mg/dL    Alkaline Phosphatase 75 33 - 110 U/L    ALT 22 7 - 45 U/L    AST 28 9 - 39 U/L    Total Protein 5.1 (L) 6.4 - 8.2 g/dL   CBC   Result Value Ref Range    WBC 15.0 (H) 4.4 - 11.3 x10*3/uL    nRBC 0.0 0.0 - 0.0 /100 WBCs    RBC 3.25 (L) 4.00 - 5.20 x10*6/uL    Hemoglobin 9.1 (L) 12.0 - 16.0 g/dL    Hematocrit 25.4 (L) 36.0 - 46.0 %    MCV 78 (L) 80 - 100 fL    MCH 28.0 26.0 - 34.0 pg    MCHC 35.8 32.0 - 36.0 g/dL    RDW 15.3 (H) 11.5 - 14.5 %    Platelets 99 (L) 150 - 450 x10*3/uL   Coagulation Screen   Result Value Ref Range    Protime 10.7 9.8 - 12.8 seconds    INR 1.0 0.9 - 1.1    aPTT 28 27 - 38 seconds   Fibrinogen   Result Value Ref Range    Fibrinogen 487 (H) 200 - 400 mg/dL   Magnesium   Result Value Ref Range    Magnesium 4.75 (H) 1.60 - 2.40 mg/dL   Basic Metabolic Panel   Result Value Ref Range    Glucose 130 (H) 74 - 99 mg/dL    Sodium 131 (L) 136 - 145 mmol/L    Potassium 4.7 3.5 - 5.3 mmol/L    Chloride 100 98 - 107 mmol/L    Bicarbonate 20 (L) 21 - 32 mmol/L    Anion Gap 16 10 - 20 mmol/L    Urea Nitrogen 27 (H) 6 - 23 mg/dL    Creatinine 1.31 (H) 0.50 - 1.05 mg/dL    eGFR 55 (L) >60 mL/min/1.73m*2    Calcium 7.4 (L) 8.6 - 10.6 mg/dL   Phosphorus   Result Value Ref Range    Phosphorus 7.2 (H) 2.5 - 4.9 mg/dL   Blood Gas Arterial Full Panel   Result Value Ref Range    POCT pH, Arterial 7.38 7.38 - 7.42 pH    POCT pCO2, Arterial 33 (L) 38 - 42 mm Hg    POCT pO2, Arterial 111 (H) 85 - 95 mm Hg    POCT SO2, Arterial 99 94 - 100 %    POCT Oxy Hemoglobin, Arterial 96.7 94.0 - 98.0 %    POCT Hematocrit Calculated, Arterial 29.0 (L) 36.0 - 46.0 %    POCT Sodium, Arterial 130 (L) 136 - 145 mmol/L    POCT Potassium, Arterial 5.3 3.5 - 5.3 mmol/L    POCT  Chloride, Arterial 102 98 - 107 mmol/L    POCT Ionized Calcium, Arterial 1.02 (L) 1.10 - 1.33 mmol/L    POCT Glucose, Arterial 103 (H) 74 - 99 mg/dL    POCT Lactate, Arterial 1.7 0.4 - 2.0 mmol/L    POCT Base Excess, Arterial -5.0 (L) -2.0 - 3.0 mmol/L    POCT HCO3 Calculated, Arterial 19.5 (L) 22.0 - 26.0 mmol/L    POCT Hemoglobin, Arterial 9.6 (L) 12.0 - 16.0 g/dL    POCT Anion Gap, Arterial 14 10 - 25 mmo/L    Patient Temperature 37.0 degrees Celsius    FiO2 50 %   POCT GLUCOSE   Result Value Ref Range    POCT Glucose 97 74 - 99 mg/dL   Blood Gas Arterial Full Panel   Result Value Ref Range    POCT pH, Arterial 7.40 7.38 - 7.42 pH    POCT pCO2, Arterial 32 (L) 38 - 42 mm Hg    POCT pO2, Arterial 103 (H) 85 - 95 mm Hg    POCT SO2, Arterial 99 94 - 100 %    POCT Oxy Hemoglobin, Arterial 95.9 94.0 - 98.0 %    POCT Hematocrit Calculated, Arterial 28.0 (L) 36.0 - 46.0 %    POCT Sodium, Arterial 129 (L) 136 - 145 mmol/L    POCT Potassium, Arterial 5.0 3.5 - 5.3 mmol/L    POCT Chloride, Arterial 101 98 - 107 mmol/L    POCT Ionized Calcium, Arterial 1.00 (L) 1.10 - 1.33 mmol/L    POCT Glucose, Arterial 179 (H) 74 - 99 mg/dL    POCT Lactate, Arterial 1.3 0.4 - 2.0 mmol/L    POCT Base Excess, Arterial -4.3 (L) -2.0 - 3.0 mmol/L    POCT HCO3 Calculated, Arterial 19.8 (L) 22.0 - 26.0 mmol/L    POCT Hemoglobin, Arterial 9.3 (L) 12.0 - 16.0 g/dL    POCT Anion Gap, Arterial 13 10 - 25 mmo/L    Patient Temperature 37.0 degrees Celsius    FiO2 40 %   POCT GLUCOSE   Result Value Ref Range    POCT Glucose 93 74 - 99 mg/dL   CBC   Result Value Ref Range    WBC 15.0 (H) 4.4 - 11.3 x10*3/uL    nRBC 0.1 (H) 0.0 - 0.0 /100 WBCs    RBC 2.99 (L) 4.00 - 5.20 x10*6/uL    Hemoglobin 8.1 (L) 12.0 - 16.0 g/dL    Hematocrit 22.5 (L) 36.0 - 46.0 %    MCV 75 (L) 80 - 100 fL    MCH 27.1 26.0 - 34.0 pg    MCHC 36.0 32.0 - 36.0 g/dL    RDW 15.4 (H) 11.5 - 14.5 %    Platelets 102 (L) 150 - 450 x10*3/uL   Magnesium   Result Value Ref Range     Magnesium 4.26 (H) 1.60 - 2.40 mg/dL   Renal Function Panel   Result Value Ref Range    Glucose 115 (H) 74 - 99 mg/dL    Sodium 133 (L) 136 - 145 mmol/L    Potassium 4.5 3.5 - 5.3 mmol/L    Chloride 103 98 - 107 mmol/L    Bicarbonate 21 21 - 32 mmol/L    Anion Gap 14 10 - 20 mmol/L    Urea Nitrogen 25 (H) 6 - 23 mg/dL    Creatinine 1.21 (H) 0.50 - 1.05 mg/dL    eGFR 60 (L) >60 mL/min/1.73m*2    Calcium 7.3 (L) 8.6 - 10.6 mg/dL    Phosphorus 6.6 (H) 2.5 - 4.9 mg/dL    Albumin 3.0 (L) 3.4 - 5.0 g/dL   Blood Gas Arterial Full Panel   Result Value Ref Range    POCT pH, Arterial 7.44 (H) 7.38 - 7.42 pH    POCT pCO2, Arterial 31 (L) 38 - 42 mm Hg    POCT pO2, Arterial 108 (H) 85 - 95 mm Hg    POCT SO2, Arterial 99 94 - 100 %    POCT Oxy Hemoglobin, Arterial 96.1 94.0 - 98.0 %    POCT Hematocrit Calculated, Arterial 27.0 (L) 36.0 - 46.0 %    POCT Sodium, Arterial 131 (L) 136 - 145 mmol/L    POCT Potassium, Arterial 4.8 3.5 - 5.3 mmol/L    POCT Chloride, Arterial 105 98 - 107 mmol/L    POCT Ionized Calcium, Arterial 1.00 (L) 1.10 - 1.33 mmol/L    POCT Glucose, Arterial 88 74 - 99 mg/dL    POCT Lactate, Arterial 1.0 0.4 - 2.0 mmol/L    POCT Base Excess, Arterial -2.5 (L) -2.0 - 3.0 mmol/L    POCT HCO3 Calculated, Arterial 21.1 (L) 22.0 - 26.0 mmol/L    POCT Hemoglobin, Arterial 8.9 (L) 12.0 - 16.0 g/dL    POCT Anion Gap, Arterial 10 10 - 25 mmo/L    Patient Temperature 37.0 degrees Celsius    FiO2 21 %   CBC   Result Value Ref Range    WBC 15.3 (H) 4.4 - 11.3 x10*3/uL    nRBC 0.1 (H) 0.0 - 0.0 /100 WBCs    RBC 2.85 (L) 4.00 - 5.20 x10*6/uL    Hemoglobin 8.0 (L) 12.0 - 16.0 g/dL    Hematocrit 22.4 (L) 36.0 - 46.0 %    MCV 79 (L) 80 - 100 fL    MCH 28.1 26.0 - 34.0 pg    MCHC 35.7 32.0 - 36.0 g/dL    RDW 15.9 (H) 11.5 - 14.5 %    Platelets 101 (L) 150 - 450 x10*3/uL     XR chest 1 view    Result Date: 8/4/2024  Interpreted By:  Pedro Jorgensen, STUDY: XR CHEST 1 VIEW;  8/4/2024 7:10 am   INDICATION: Signs/Symptoms:ICU admit.    COMPARISON: Chest radiograph 08/04/2024, 3:19 a.m. and abdominopelvic CT scan 08/03/2024   ACCESSION NUMBER(S): [id number]   ORDERING CLINICIAN: NED ARREAGA   FINDINGS: Supine AP radiograph of the chest. Interval extubation and removal of enteric tube.   The cardiomediastinal silhouette is stable in size and contour.   Continued low lung volumes contributing to bronchovascular crowding. There is similar bilateral mid basilar lung predominant hazy opacification with mild blunting of costophrenic angles, likely correlating with layering pleural effusions and associated atelectasis on CT scan from yesterday. There is no pneumothorax.   ABDOMEN: Partially visualized gaseous prominence of bowel loops within included abdomen, better assessed on abdominal radiographs from earlier same day.   BONES: No acute osseous abnormality.       1. Similar bilateral mid basilar lung predominant hazy opacification with mild blunting of costophrenic angles, likely correlating with layering pleural effusions and associated atelectasis on CT scan from yesterday.   Signed by: Pedro Jorgensen 8/4/2024 8:18 AM Dictation workstation:   [id number]    XR chest 1 view    Result Date: 8/4/2024  Interpreted By:  Pedro Jorgensen and Liller Gregory STUDY: XR CHEST 1 VIEW; XR ABDOMEN 1 VIEW;  8/4/2024 3:34 am   INDICATION: Signs/Symptoms:Intubated; Signs/Symptoms:OGT placement.   COMPARISON: Same day CTA abdomen and pelvis. Chest radiograph 08/02/2024.   ACCESSION NUMBER(S): [id number]; [id number]   ORDERING CLINICIAN: NED ARREAGA   FINDINGS: Single AP radiograph of chest and 2 AP radiographs of abdomen are available for interpretation.   Interval intubation with endotracheal tube tip 4.3 cm superior to catherine. Enteric tube is now visualized with tip projecting over expected location of distal stomach/proximal duodenum.   The cardiomediastinal silhouette is stable in size and contour.   Low lung volumes contributing to bronchovascular  crowding. There is hazy opacification of bilateral lung fields with mild blunting of bilateral costophrenic angles, correlating with layering bilateral pleural effusions. And associated atelectasis There is no pneumothorax.   Multiple gas-filled prominent/mildly dilated small bowel loops within left hemiabdomen measuring up to 3.4 cm. Large stool burden noted within the cecum and ascending colon.   Visualized soft tissues are unremarkable. No acute osseous changes identified.       1. Demonstration of layering bilateral pleural effusions and associated atelectasis, better assessed on CT scan from earlier same day. 2. Large stool burden within the cecum and ascending colon. 3. Multiple gas-filled prominent/mildly dilated small bowel loops within left hemiabdomen. Correlate with concern for ileus versus distal fecal impaction.   I personally reviewed the images/study and I agree with the findings as stated above by resident physician, Dr. Kelvin Lindsey.   MACRO: none   Signed by: Pedro Joregnsen 8/4/2024 8:16 AM Dictation workstation:   [id number]    XR abdomen 1 view    Result Date: 8/4/2024  Interpreted By:  Pedro Jorgensen and Liller Gregory STUDY: XR CHEST 1 VIEW; XR ABDOMEN 1 VIEW;  8/4/2024 3:34 am   INDICATION: Signs/Symptoms:Intubated; Signs/Symptoms:OGT placement.   COMPARISON: Same day CTA abdomen and pelvis. Chest radiograph 08/02/2024.   ACCESSION NUMBER(S): [id number]; [id number]   ORDERING CLINICIAN: NED ARREAGA   FINDINGS: Single AP radiograph of chest and 2 AP radiographs of abdomen are available for interpretation.   Interval intubation with endotracheal tube tip 4.3 cm superior to catherine. Enteric tube is now visualized with tip projecting over expected location of distal stomach/proximal duodenum.   The cardiomediastinal silhouette is stable in size and contour.   Low lung volumes contributing to bronchovascular crowding. There is hazy opacification of bilateral lung fields with mild blunting  of bilateral costophrenic angles, correlating with layering bilateral pleural effusions. And associated atelectasis There is no pneumothorax.   Multiple gas-filled prominent/mildly dilated small bowel loops within left hemiabdomen measuring up to 3.4 cm. Large stool burden noted within the cecum and ascending colon.   Visualized soft tissues are unremarkable. No acute osseous changes identified.       1. Demonstration of layering bilateral pleural effusions and associated atelectasis, better assessed on CT scan from earlier same day. 2. Large stool burden within the cecum and ascending colon. 3. Multiple gas-filled prominent/mildly dilated small bowel loops within left hemiabdomen. Correlate with concern for ileus versus distal fecal impaction.   I personally reviewed the images/study and I agree with the findings as stated above by resident physician, Dr. Kelvin Lindsey.   MACRO: none   Signed by: Pedro Jorgensen 2024 8:16 AM Dictation workstation:   [id number]    CT angio abdomen pelvis w and or wo IV IV contrast    Result Date: 2024  Interpreted By:  Anoop Powell and Kamau Nyokabi STUDY: CT ANGIO ABDOMEN PELVIS W AND/OR WO IV IV CONTRAST;  2024 12:08 am   INDICATION: Signs/Symptoms:concern for inferior epigatric hematoma.   Per EMR:  34-year-old female with preeclampsia postop day 1 from . Recent ex lap for suspected abdominal bleeding where large rectus sheath hematoma and blood found behind the bladder.   COMPARISON: None.   ACCESSION NUMBER(S): [id number]   ORDERING CLINICIAN: NED ARREAGA   TECHNIQUE: Axial non-contrast images of the  abdomen, and pelvis.   Axial CT images of the  abdomen and pelvis were obtained after the intravenous administration of 90 cc mL Omnipaque 350 using angiographic technique with coronal and sagittal reformatted images. MIP images and 3D reconstructions were created on an independent workstation and reviewed.   FINDINGS: Partially visualized  bilateral lower lobes demonstrate small right-greater-than-left pleural effusions with associated subsegmental atelectasis bilateral lower lobes.   VASCULATURE:   ABDOMINAL AORTA: No abdominal aortic aneurysm or dissection. No significant abdominal aortic atherosclerosis.   ABDOMINAL AND PELVIC ARTERIES:  No hemodynamically significant stenosis or occlusion.     CT ABDOMEN/PELVIS:   ABDOMINAL WALL: Asymmetric thickening of the left rectus abdominis musculature with hyperdense attenuation that extends into the left hemipelvis and posterior to the bladder as well as subcutaneous air foci consistent with rectus sheath hematoma. There is no contrast extravasation to suggest active bleed. Additional multiple air foci seen within the lower abdominal musculature and right groin, status post  section.   LIVER: No significant parenchymal abnormality.   BILE DUCTS: No significant intrahepatic or extrahepatic dilatation.   GALLBLADDER: No significant abnormality.   PANCREAS: No significant abnormality.   SPLEEN: No significant abnormality. Multiple small splenules noted.   ADRENALS: No significant abnormality.   KIDNEYS, URETERS, BLADDER: No significant abnormality. Bladder is decompressed with Ahumada catheter noted.   REPRODUCTIVE ORGANS: Heterogenous appearance of an enlarged uterus, consistent with postpartum state. Postoperative changes from .   VESSELS: (See above). No additional significant abnormality.   RETROPERITONEUM/LYMPH NODES: No enlarged lymph nodes. No acute retroperitoneal abnormality.   BOWEL/MESENTERY/PERITONEUM: Partially visualized nasogastric tube with tip terminating in the gastric antrum/pylorus. No inflammatory bowel wall thickening or dilatation. Moderate colonic stool burden in the transverse and ascending colon. Normal appendix.   Small free abdominopelvic ascites. No significant ascites, free air, or fluid collection.     OSSEOUS STRUCTURES: No acute osseous abnormality.       1.  Findings concerning for left rectus sheath hematoma which extends into the left hemipelvis without evidence of contrast extravasation to suggest active hemorrhage. 2. Postoperative changes from  including subcutaneous emphysema within the lower abdominal musculature and heterogenous appearance of the uterus  consistent with postpartum state. 4. Partially visualized right-greater-than-left small pleural effusions with associated subsegmental atelectasis. 3.No abdominal aortic aneurysm or acute aortic pathology. 4. Moderate colonic stool burden in the transverse and ascending colon.   I personally reviewed the images/study and I agree with Dr. Joslyn Novoa findings as stated. This study was interpreted at [place], [place]   MACRO: None.   Signed by: Anoop Powell 2024 7:45 AM Dictation workstation:   [id number]    XR chest 1 view    Result Date: 2024  Interpreted By:  Ji Rodriguez, STUDY: XR CHEST 1 VIEW;  2024 10:15 pm   INDICATION: Signs/Symptoms:oxygen requirement.   COMPARISON: None.   ACCESSION NUMBER(S): [id number]   ORDERING CLINICIAN: FRANCESCA SUTTON   FINDINGS:   CARDIOMEDIASTINAL SILHOUETTE: Cardiomediastinal silhouette is normal in size and configuration.   LUNGS/PLEURA: There are no consolidations.There are no pleural effusions. There is no demonstrated pneumothorax.     BONES: No evidence of acute osseous abnormality.       1.  No evidence of acute cardiopulmonary process.     Signed by: Ji Rodriguez 2024 11:01 PM Dictation workstation:   [id number]    US fetal biophysical profile wo non stress testing    Result Date: 2024  Interpreted by: Kamila Soto Indication ======== BPP for Hypertension, Superimposed Pre-eclampsia, Class I Obesity (BMI 30-34.9), Inpatient, Mac 4 History ====== General History Smoking: no Height 155 cm Height (ft) 5 ft Height (in) 1 in Previous Outcomes  7 Para 3 Children born living  ?37w 2 Pregnancies delivered at term (T) 2 Pregnancies delivered  (P) 1 Abortions (A) 3 Living children (L) 3 Children born living <37w 1 Miscarriages 1 Ectopic 2 Other: Previous  Section Maternal Assessment ================= Height 155 cm Height (ft) 5 ft Height (in) 1 in Weight 77 kg Weight (lb) 170 lb Weight gain 0 kg Weight gain (lb) 0 lb BMI 32.12 kg/mï¿½ Physical Exam Initial weight (lb) 170 lb Pregnancy ========= Carpio pregnancy. Number of fetuses: 1 Dating ====== LMP on: 2023 Cycle: Very certain LMP, regular cycle GA by LMP 31 w + 3 d ANAMARIA by LMP: 2024 Conception: LMP GA by prior assessment 31 w + 3 d ANAMARIA by prior assessment: 2024 Assigned: based on the LMP, selected on 2024 Assigned GA 31 w + 3 d Assigned ANAMARIA: 2024 Fetal Growth Overview ================= Exam date        GA              BPD (mm)          HC (mm)             AC (mm)              FL (mm)             HL (mm)             EFW (g) 2024        20w 2d        43.9     13%        167.1    9%        151.6     47%        33.5    50%        32.3     52%        344    45% 2024        30w 5d        73.4     9%          270.5    2%        258.6    26%         58.6    32%                                 1512     21% Impression ========= INPATIENT STUDY: Currently admitted for new diagnosis of sPEC. - Normal limited anatomic assessment. Biometry not repeated due to a short interval scan. - BPP 8/8 - Normal fluid See inpatient chart for full plan of care. General Evaluation ============== Cardiac activity present.  bpm. Fetal movements: visualized. Presentation: cephalic Placenta: Placental site: anterior, left, posterior Umbilical cord: Cord vessels: 3 vessel cord Biophysical Profile ============== 2: Fetal breathing movements 2: Gross body movements 2: Fetal tone 2: Amniotic fluid volume 8 Biophysical profile score Amniotic Fluid Assessment ===================== Amount of AF: normal amount  MVP 4.9 cm. CIERRA 14.4 cm. Q1 3.1 cm, Q2 2.4 cm, Q3 4.1 cm, Q4 4.9 cm Maternal Structures =============== Uterus / Cervix Uterus: Visualized Cervix: Not examined Ovaries / Tubes / Adnexa Rt ovary: Visualized Rt ovary details: Normal Lt ovary: Visualized Lt ovary details: Normal Method ====== Transabdominal ultrasound examination. View: Sufficient Indication ======== BPP for Hypertension, Superimposed Pre-eclampsia, Class I Obesity (BMI 30-34.9), Inpatient, Mac 4 History ====== General History Smoking: no Height 155 cm Height (ft) 5 ft Height (in) 1 in Previous Outcomes  7 Para 3 Children born living ?37w 2 Pregnancies delivered at term (T) 2 Pregnancies delivered  (P) 1 Abortions (A) 3 Living children (L) 3 Children born living <37w 1 Miscarriages 1 Ectopic 2 Other: Previous  Section Maternal Assessment ================= Height 155 cm Height (ft) 5 ft Height (in) 1 in Weight 77 kg Weight (lb) 170 lb Weight gain 0 kg Weight gain (lb) 0 lb BMI 32.12 kg/mï¿½ Physical Exam Initial weight (lb) 170 lb Pregnancy ========= Carpio pregnancy. Number of fetuses: 1 Dating ====== LMP on: 2023 Cycle: Very certain LMP, regular cycle GA by LMP 31 w + 3 d ANAMARIA by LMP: 2024 Conception: LMP GA by prior assessment 31 w + 3 d ANAMARIA by prior assessment: 2024 Assigned: based on the LMP, selected on 2024 Assigned GA 31 w + 3 d Assigned ANAMARIA: 2024 Fetal Growth Overview ================= Exam date        GA              BPD (mm)          HC (mm)             AC (mm)              FL (mm)             HL (mm)             EFW (g) 2024        20w 2d        43.9     13%        167.1    9%        151.6     47%        33.5    50%        32.3     52%        344    45% 2024        30w 5d        73.4     9%          270.5    2%        258.6    26%         58.6    32%                                 1512     21% Impression ========= INPATIENT STUDY: Currently admitted for new diagnosis of sPEC. -  Normal limited anatomic assessment. Biometry not repeated due to a short interval scan. - BPP 8 - Normal fluid See inpatient chart for full plan of care. General Evaluation ============== Cardiac activity present.  bpm. Fetal movements: visualized. Presentation: cephalic Placenta: Placental site: anterior, left, posterior Umbilical cord: Cord vessels: 3 vessel cord Biophysical Profile ============== 2: Fetal breathing movements 2: Gross body movements 2: Fetal tone 2: Amniotic fluid volume  Biophysical profile score Amniotic Fluid Assessment ===================== Amount of AF: normal amount MVP 4.9 cm. CIERRA 14.4 cm. Q1 3.1 cm, Q2 2.4 cm, Q3 4.1 cm, Q4 4.9 cm Maternal Structures =============== Uterus / Cervix Uterus: Visualized Cervix: Not examined Ovaries / Tubes / Adnexa Rt ovary: Visualized Rt ovary details: Normal Lt ovary: Visualized Lt ovary details: Normal Method ====== Transabdominal ultrasound examination. View: Sufficient    US OB limited 1+ fetuses    Result Date: 2024  Interpreted by: Kamila Soto Indication ======== BPP for Hypertension, Superimposed Pre-eclampsia, Class I Obesity (BMI 30-34.9), Inpatient, Mac 4 History ====== General History Smoking: no Height 155 cm Height (ft) 5 ft Height (in) 1 in Previous Outcomes  7 Para 3 Children born living ?37w 2 Pregnancies delivered at term (T) 2 Pregnancies delivered  (P) 1 Abortions (A) 3 Living children (L) 3 Children born living <37w 1 Miscarriages 1 Ectopic 2 Other: Previous  Section Maternal Assessment ================= Height 155 cm Height (ft) 5 ft Height (in) 1 in Weight 77 kg Weight (lb) 170 lb Weight gain 0 kg Weight gain (lb) 0 lb BMI 32.12 kg/mï¿½ Physical Exam Initial weight (lb) 170 lb Pregnancy ========= Carpio pregnancy. Number of fetuses: 1 Dating ====== LMP on: 2023 Cycle: Very certain LMP, regular cycle GA by LMP 31 w + 3 d ANAMARIA by LMP: 2024 Conception: LMP GA by prior assessment 31 w  + 3 d ANAMARIA by prior assessment: 2024 Assigned: based on the LMP, selected on 2024 Assigned GA 31 w + 3 d Assigned ANAMARIA: 2024 Fetal Growth Overview ================= Exam date        GA              BPD (mm)          HC (mm)             AC (mm)              FL (mm)             HL (mm)             EFW (g) 2024        20w 2d        43.9     13%        167.1    9%        151.6     47%        33.5    50%        32.3     52%        344    45% 2024        30w 5d        73.4     9%          270.5    2%        258.6    26%         58.6    32%                                 1512     21% Impression ========= INPATIENT STUDY: Currently admitted for new diagnosis of sPEC. - Normal limited anatomic assessment. Biometry not repeated due to a short interval scan. - BPP 8/8 - Normal fluid See inpatient chart for full plan of care. General Evaluation ============== Cardiac activity present.  bpm. Fetal movements: visualized. Presentation: cephalic Placenta: Placental site: anterior, left, posterior Umbilical cord: Cord vessels: 3 vessel cord Biophysical Profile ============== 2: Fetal breathing movements 2: Gross body movements 2: Fetal tone 2: Amniotic fluid volume  Biophysical profile score Amniotic Fluid Assessment ===================== Amount of AF: normal amount MVP 4.9 cm. CIERRA 14.4 cm. Q1 3.1 cm, Q2 2.4 cm, Q3 4.1 cm, Q4 4.9 cm Maternal Structures =============== Uterus / Cervix Uterus: Visualized Cervix: Not examined Ovaries / Tubes / Adnexa Rt ovary: Visualized Rt ovary details: Normal Lt ovary: Visualized Lt ovary details: Normal Method ====== Transabdominal ultrasound examination. View: Sufficient Indication ======== BPP for Hypertension, Superimposed Pre-eclampsia, Class I Obesity (BMI 30-34.9), Inpatient, Mac 4 History ====== General History Smoking: no Height 155 cm Height (ft) 5 ft Height (in) 1 in Previous Outcomes  7 Para 3 Children born living ?37w 2 Pregnancies delivered  at term (T) 2 Pregnancies delivered  (P) 1 Abortions (A) 3 Living children (L) 3 Children born living <37w 1 Miscarriages 1 Ectopic 2 Other: Previous  Section Maternal Assessment ================= Height 155 cm Height (ft) 5 ft Height (in) 1 in Weight 77 kg Weight (lb) 170 lb Weight gain 0 kg Weight gain (lb) 0 lb BMI 32.12 kg/mï¿½ Physical Exam Initial weight (lb) 170 lb Pregnancy ========= Carpio pregnancy. Number of fetuses: 1 Dating ====== LMP on: 2023 Cycle: Very certain LMP, regular cycle GA by LMP 31 w + 3 d ANAMARIA by LMP: 2024 Conception: LMP GA by prior assessment 31 w + 3 d ANAMARIA by prior assessment: 2024 Assigned: based on the LMP, selected on 2024 Assigned GA 31 w + 3 d Assigned ANAMARIA: 2024 Fetal Growth Overview ================= Exam date        GA              BPD (mm)          HC (mm)             AC (mm)              FL (mm)             HL (mm)             EFW (g) 2024        20w 2d        43.9     13%        167.1    9%        151.6     47%        33.5    50%        32.3     52%        344    45% 2024        30w 5d        73.4     9%          270.5    2%        258.6    26%         58.6    32%                                 1512     21% Impression ========= INPATIENT STUDY: Currently admitted for new diagnosis of sPEC. - Normal limited anatomic assessment. Biometry not repeated due to a short interval scan. - BPP  - Normal fluid See inpatient chart for full plan of care. General Evaluation ============== Cardiac activity present.  bpm. Fetal movements: visualized. Presentation: cephalic Placenta: Placental site: anterior, left, posterior Umbilical cord: Cord vessels: 3 vessel cord Biophysical Profile ============== 2: Fetal breathing movements 2: Gross body movements 2: Fetal tone 2: Amniotic fluid volume  Biophysical profile score Amniotic Fluid Assessment ===================== Amount of AF: normal amount MVP 4.9 cm. CIERRA 14.4 cm. Q1 3.1  cm, Q2 2.4 cm, Q3 4.1 cm, Q4 4.9 cm Maternal Structures =============== Uterus / Cervix Uterus: Visualized Cervix: Not examined Ovaries / Tubes / Adnexa Rt ovary: Visualized Rt ovary details: Normal Lt ovary: Visualized Lt ovary details: Normal Method ====== Transabdominal ultrasound examination. View: Sufficient    MR brain wo IV contrast    Result Date: 7/30/2024  Interpreted By:  Ji Rodriguez and Liller Gregory STUDY: MR BRAIN WO IV CONTRAST; MR VENOGRAPHY INTRACRANIAL WO IV CONTRAST; MR ANGIO HEAD WO IV CONTRAST;  7/29/2024 11:11 pm   INDICATION: Signs/Symptoms:Pregnant, headache; Signs/Symptoms:Pregnant, intractable headache.  Stroke protocol.   COMPARISON: None   ACCESSION NUMBER(S): [id number]; [id number]; [id number]   ORDERING CLINICIAN: EUEFMIA CASTRO   TECHNIQUE: Axial T2, FLAIR, DWI, gradient echo T2 and  sagittal and coronal T1 weighted images of brain were acquired.   Time-of-flight MRA of the head was performed. The images were reviewed as source images and maximum intensity projections.   Noncontrast time-of-flight MR venogram was also obtained with MIP reformats.   FINDINGS: Brain:   CSF Spaces: The ventricles, sulci and basal cisterns are within normal limits. No abnormal extra-axial fluid collection.   Parenchyma: There is no diffusion restriction abnormality to suggest acute infarct.   There is no mass effect or midline shift. No intraparenchymal hemorrhage. There are bilateral subinsular and white matter perivascular spaces. There is a chronic lacunar infarct in the right inferior cerebellar hemisphere. No other significant brain parenchymal abnormality.   Paranasal Sinuses and Mastoids: Visualized paranasal sinuses and mastoid air cells are unremarkable.     Venogram:   The right sigmoid sinus and upper right internal jugular vein are diminutive in caliber corresponding to smaller right-sided jugular foramen. There is absent flow enhancement of the right transverse sinus, which may  also be congenital, without secondary signs of dural venous sinus thrombosis on brain MRI. The remainder of the visualized venous sinuses display expected flow signal.     MRA of head:   Anterior circulation:    There is expected flow signal in bilateral intracranial internal carotid arteries, bilateral carotid terminals, bilateral proximal anterior and middle cerebral arteries.   Posterior circulation:   Bilateral intracranial vertebral arteries, vertebrobasilar junction, basilar artery and proximal posterior cerebral arteries demonstrate expected flow signal.       MRI Brain: 1. No evidence of acute infarct, intracranial mass effect or midline shift. 2. Chronic lacunar infarct in the right inferior cerebellar hemisphere.   MRA/Venogram: 1. No evidence of stenosis or large vessel occlusion intracranially. 2. There is diminished flow enhancement within the right sigmoid sinus with associated smaller right jugular foramen, which may be a normal developmental variant. There is also absent flow enhancement within the right transverse sinus without secondary signs of acute dural venous sinus thrombosis on brain MRI, which may also be congenital. Clinical correlation is recommended.   I personally reviewed the images/study and I agree with the findings as stated above by resident physician, Dr. Kelvin Lindsey.   MACRO: None   Signed by: Ji Rodriguez 7/30/2024 12:10 AM Dictation workstation:   [id number]    MR angio head wo IV contrast    Result Date: 7/30/2024  Interpreted By:  Ji Rodriguez and Liller Gregory STUDY: MR BRAIN WO IV CONTRAST; MR VENOGRAPHY INTRACRANIAL WO IV CONTRAST; MR ANGIO HEAD WO IV CONTRAST;  7/29/2024 11:11 pm   INDICATION: Signs/Symptoms:Pregnant, headache; Signs/Symptoms:Pregnant, intractable headache.  Stroke protocol.   COMPARISON: None   ACCESSION NUMBER(S): [id number]; [id number]; [id number]   ORDERING CLINICIAN: EUFEMIA CASTRO   TECHNIQUE: Axial T2, FLAIR, DWI, gradient echo  T2 and  sagittal and coronal T1 weighted images of brain were acquired.   Time-of-flight MRA of the head was performed. The images were reviewed as source images and maximum intensity projections.   Noncontrast time-of-flight MR venogram was also obtained with MIP reformats.   FINDINGS: Brain:   CSF Spaces: The ventricles, sulci and basal cisterns are within normal limits. No abnormal extra-axial fluid collection.   Parenchyma: There is no diffusion restriction abnormality to suggest acute infarct.   There is no mass effect or midline shift. No intraparenchymal hemorrhage. There are bilateral subinsular and white matter perivascular spaces. There is a chronic lacunar infarct in the right inferior cerebellar hemisphere. No other significant brain parenchymal abnormality.   Paranasal Sinuses and Mastoids: Visualized paranasal sinuses and mastoid air cells are unremarkable.     Venogram:   The right sigmoid sinus and upper right internal jugular vein are diminutive in caliber corresponding to smaller right-sided jugular foramen. There is absent flow enhancement of the right transverse sinus, which may also be congenital, without secondary signs of dural venous sinus thrombosis on brain MRI. The remainder of the visualized venous sinuses display expected flow signal.     MRA of head:   Anterior circulation:    There is expected flow signal in bilateral intracranial internal carotid arteries, bilateral carotid terminals, bilateral proximal anterior and middle cerebral arteries.   Posterior circulation:   Bilateral intracranial vertebral arteries, vertebrobasilar junction, basilar artery and proximal posterior cerebral arteries demonstrate expected flow signal.       MRI Brain: 1. No evidence of acute infarct, intracranial mass effect or midline shift. 2. Chronic lacunar infarct in the right inferior cerebellar hemisphere.   MRA/Venogram: 1. No evidence of stenosis or large vessel occlusion intracranially. 2. There is  diminished flow enhancement within the right sigmoid sinus with associated smaller right jugular foramen, which may be a normal developmental variant. There is also absent flow enhancement within the right transverse sinus without secondary signs of acute dural venous sinus thrombosis on brain MRI, which may also be congenital. Clinical correlation is recommended.   I personally reviewed the images/study and I agree with the findings as stated above by resident physician, Dr. Kelvin Lindsey.   MACRO: None   Signed by: Ji Rodriguez 7/30/2024 12:10 AM Dictation workstation:   [id number]    MR venography intracranial wo IV contrast    Result Date: 7/30/2024  Interpreted By:  Ji Rodriguez and Liller Gregory STUDY: MR BRAIN WO IV CONTRAST; MR VENOGRAPHY INTRACRANIAL WO IV CONTRAST; MR ANGIO HEAD WO IV CONTRAST;  7/29/2024 11:11 pm   INDICATION: Signs/Symptoms:Pregnant, headache; Signs/Symptoms:Pregnant, intractable headache.  Stroke protocol.   COMPARISON: None   ACCESSION NUMBER(S): [id number]; [id number]; [id number]   ORDERING CLINICIAN: EUFEMIA CASTRO   TECHNIQUE: Axial T2, FLAIR, DWI, gradient echo T2 and  sagittal and coronal T1 weighted images of brain were acquired.   Time-of-flight MRA of the head was performed. The images were reviewed as source images and maximum intensity projections.   Noncontrast time-of-flight MR venogram was also obtained with MIP reformats.   FINDINGS: Brain:   CSF Spaces: The ventricles, sulci and basal cisterns are within normal limits. No abnormal extra-axial fluid collection.   Parenchyma: There is no diffusion restriction abnormality to suggest acute infarct.   There is no mass effect or midline shift. No intraparenchymal hemorrhage. There are bilateral subinsular and white matter perivascular spaces. There is a chronic lacunar infarct in the right inferior cerebellar hemisphere. No other significant brain parenchymal abnormality.   Paranasal Sinuses and Mastoids:  Visualized paranasal sinuses and mastoid air cells are unremarkable.     Venogram:   The right sigmoid sinus and upper right internal jugular vein are diminutive in caliber corresponding to smaller right-sided jugular foramen. There is absent flow enhancement of the right transverse sinus, which may also be congenital, without secondary signs of dural venous sinus thrombosis on brain MRI. The remainder of the visualized venous sinuses display expected flow signal.     MRA of head:   Anterior circulation:    There is expected flow signal in bilateral intracranial internal carotid arteries, bilateral carotid terminals, bilateral proximal anterior and middle cerebral arteries.   Posterior circulation:   Bilateral intracranial vertebral arteries, vertebrobasilar junction, basilar artery and proximal posterior cerebral arteries demonstrate expected flow signal.       MRI Brain: 1. No evidence of acute infarct, intracranial mass effect or midline shift. 2. Chronic lacunar infarct in the right inferior cerebellar hemisphere.   MRA/Venogram: 1. No evidence of stenosis or large vessel occlusion intracranially. 2. There is diminished flow enhancement within the right sigmoid sinus with associated smaller right jugular foramen, which may be a normal developmental variant. There is also absent flow enhancement within the right transverse sinus without secondary signs of acute dural venous sinus thrombosis on brain MRI, which may also be congenital. Clinical correlation is recommended.   I personally reviewed the images/study and I agree with the findings as stated above by resident physician, Dr. Kelvin Lindsey.   MACRO: None   Signed by: Ji Rodriguez 7/30/2024 12:10 AM Dictation workstation:   [id number]     OB follow UP transabdominal approach    Result Date: 7/26/2024  Interpreted by: Cristy Akers Indication ======== Growth for Suspect Problem with Fetal Growth, Vaginal Bleeding History ====== General History  Smoking: no Height 155 cm Height (ft) 5 ft Height (in) 1 in Previous Outcomes  7 Para 3 Children born living ?37w 2 Pregnancies delivered at term (T) 2 Pregnancies delivered  (P) 1 Abortions (A) 3 Living children (L) 3 Children born living <37w 1 Miscarriages 1 Ectopic 2 Other: Previous  Section Maternal Assessment ================= Height 155 cm Height (ft) 5 ft Height (in) 1 in Weight 77 kg Weight (lb) 170 lb Weight gain 0 kg Weight gain (lb) 0 lb BMI 32.12 kg/mï¿½ Physical Exam Initial weight (lb) 170 lb Pregnancy ========= Carpio pregnancy. Number of fetuses: 1 Dating ====== LMP on: 2023 Cycle: Very certain LMP, regular cycle GA by LMP 30 w + 5 d ANAMARIA by LMP: 2024 Conception: LMP GA by prior assessment 30 w + 5 d ANAMARIA by prior assessment: 2024 Ultrasound examination on: 2024 GA by U/S based upon: AC, BPD, Femur, HC GA by U/S 29 w + 6 d ANAMARIA by U/S: 10/4/2024 Assigned: based on the LMP, selected on 2024 Assigned GA 30 w + 5 d Assigned ANAMARIA: 2024 Fetal Growth Overview ================= Exam date        GA              BPD (mm)          HC (mm)             AC (mm)              FL (mm)             HL (mm)             EFW (g) 2024        20w 2d        43.9     13%        167.1    9%        151.6     47%        33.5    50%        32.3     52%        344    45% 2024        30w 5d        73.4     9%          270.5    2%        258.6    26%         58.6    32%                                 1512     21% Impression ========= -Appropriate fetal growth and AFV. EFW at the 21st%. -BPP Score is 8/8 (h/o vaginal bleeding in pregnancy) -No malformations were identified on a limited survey Follow-up ======== A repeat evaluation has been scheduled in 4 weeks for fetal growth. Thank you allowing us to participate in the care of your patient General Evaluation ============== Cardiac activity present.  bpm. Fetal movements: visualized. Presentation: cephalic  Placenta: Placental site: anterior, left, posterior, No Previa Seen Umbilical cord: Cord vessels: 3 vessel cord Amniotic fluid: Amount of AF: normal amount. MVP 6.9 cm. CIERRA 18.6 cm. Q1 6.3 cm, Q2 2.6 cm, Q3 6.9 cm, Q4 2.7 cm Fetal Biometry ============ Standard BPD 73.4 mm 29w 3d 9% Hadlock OFD 96.6 mm  19% INTERGROWTH-21st .5 mm 29w 4d 2% Hadlock Cerebellum tr 37.7 mm 30w 6d 57% Leary .6 mm 30w 0d 26% Hadlock Femur 58.6 mm 30w 4d 32% Hadlock HC / AC 1.05 EFW 1,512 g 29w 5d 21% Hadlock EFW (lb) 3 lb EFW (oz) 5 oz EFW by: Hadlock (BPD-HC-AC-FL) Extended  4.5 mm Head / Face / Neck Cephalic index 0.76  16% Nicolaides Extremities / Bony Struc FL / BPD 0.80 FL / HC 0.22 FL / AC 0.23 Other Structures  bpm Fetal Anatomy =========== Cranium: Normal Lateral ventricles: Normal Midline falx: Normal Cavum septi pellucidi: Normal Cerebellum: Normal Cisterna magna: Normal Head / Neck Rt lateral ventricle: Normal Lt lateral ventricle: Normal Thalami: Normal Cerebellar lobes: Normal Lips: Normal Profile: suboptimal Nose: Normal 4-chamber view: Normal RVOT view: Normal LVOT view: Normal 3-vessel view: suboptimal 3-vessel-trachea view: Normal Heart / Thorax Aortic arch view: Normal Cardiac axis: Normal Diaphragm: Normal Stomach: Normal Kidneys: Normal Bladder: visualized Genitals: Normal Abdomen Stomach: correct situs Rt kidney: Normal Lt kidney: Normal Large bowel: Normal Fetal sex: female Wants to know fetal sex: yes Biophysical Profile ============== 2: Fetal breathing movements 2: Gross body movements 2: Fetal tone 2: Amniotic fluid volume 8/8 Biophysical profile score Maternal Structures =============== Uterus / Cervix Cervix: Visualized Cervix details: normal Ovaries / Tubes / Adnexa Rt ovary: Visualized Rt ovary details: Normal Lt ovary: Visualized Lt ovary details: Normal Method ====== Transabdominal ultrasound examination. View: Suboptimal view: limited by late gestational age Indication ======== Growth  for Suspect Problem with Fetal Growth, Vaginal Bleeding History ====== General History Smoking: no Height 155 cm Height (ft) 5 ft Height (in) 1 in Previous Outcomes  7 Para 3 Children born living ?37w 2 Pregnancies delivered at term (T) 2 Pregnancies delivered  (P) 1 Abortions (A) 3 Living children (L) 3 Children born living <37w 1 Miscarriages 1 Ectopic 2 Other: Previous  Section Maternal Assessment ================= Height 155 cm Height (ft) 5 ft Height (in) 1 in Weight 77 kg Weight (lb) 170 lb Weight gain 0 kg Weight gain (lb) 0 lb BMI 32.12 kg/mï¿½ Physical Exam Initial weight (lb) 170 lb Pregnancy ========= Carpio pregnancy. Number of fetuses: 1 Dating ====== LMP on: 2023 Cycle: Very certain LMP, regular cycle GA by LMP 30 w + 5 d ANAMARIA by LMP: 2024 Conception: LMP GA by prior assessment 30 w + 5 d ANAMARIA by prior assessment: 2024 Ultrasound examination on: 2024 GA by U/S based upon: AC, BPD, Femur, HC GA by U/S 29 w + 6 d ANAMARIA by U/S: 10/4/2024 Assigned: based on the LMP, selected on 2024 Assigned GA 30 w + 5 d Assigned ANAMARIA: 2024 Fetal Growth Overview ================= Exam date        GA              BPD (mm)          HC (mm)             AC (mm)              FL (mm)             HL (mm)             EFW (g) 2024        20w 2d        43.9     13%        167.1    9%        151.6     47%        33.5    50%        32.3     52%        344    45% 2024        30w 5d        73.4     9%          270.5    2%        258.6    26%         58.6    32%                                 1512     21% Impression ========= -Appropriate fetal growth and AFV. EFW at the 21st%. -BPP Score is 8/8 (h/o vaginal bleeding in pregnancy) -No malformations were identified on a limited survey Follow-up ======== A repeat evaluation has been scheduled in 4 weeks for fetal growth. Thank you allowing us to participate in the care of your patient General Evaluation ==============  Cardiac activity present.  bpm. Fetal movements: visualized. Presentation: cephalic Placenta: Placental site: anterior, left, posterior, No Previa Seen Umbilical cord: Cord vessels: 3 vessel cord Amniotic fluid: Amount of AF: normal amount. MVP 6.9 cm. CIERRA 18.6 cm. Q1 6.3 cm, Q2 2.6 cm, Q3 6.9 cm, Q4 2.7 cm Fetal Biometry ============ Standard BPD 73.4 mm 29w 3d 9% Hadlock OFD 96.6 mm  19% INTERGROWTH-21st .5 mm 29w 4d 2% Hadlock Cerebellum tr 37.7 mm 30w 6d 57% Leary .6 mm 30w 0d 26% Hadlock Femur 58.6 mm 30w 4d 32% Hadlock HC / AC 1.05 EFW 1,512 g 29w 5d 21% Hadlock EFW (lb) 3 lb EFW (oz) 5 oz EFW by: Hadlock (BPD-HC-AC-FL) Extended  4.5 mm Head / Face / Neck Cephalic index 0.76  16% Nicolaides Extremities / Bony Struc FL / BPD 0.80 FL / HC 0.22 FL / AC 0.23 Other Structures  bpm Fetal Anatomy =========== Cranium: Normal Lateral ventricles: Normal Midline falx: Normal Cavum septi pellucidi: Normal Cerebellum: Normal Cisterna magna: Normal Head / Neck Rt lateral ventricle: Normal Lt lateral ventricle: Normal Thalami: Normal Cerebellar lobes: Normal Lips: Normal Profile: suboptimal Nose: Normal 4-chamber view: Normal RVOT view: Normal LVOT view: Normal 3-vessel view: suboptimal 3-vessel-trachea view: Normal Heart / Thorax Aortic arch view: Normal Cardiac axis: Normal Diaphragm: Normal Stomach: Normal Kidneys: Normal Bladder: visualized Genitals: Normal Abdomen Stomach: correct situs Rt kidney: Normal Lt kidney: Normal Large bowel: Normal Fetal sex: female Wants to know fetal sex: yes Biophysical Profile ============== 2: Fetal breathing movements 2: Gross body movements 2: Fetal tone 2: Amniotic fluid volume 8/8 Biophysical profile score Maternal Structures =============== Uterus / Cervix Cervix: Visualized Cervix details: normal Ovaries / Tubes / Adnexa Rt ovary: Visualized Rt ovary details: Normal Lt ovary: Visualized Lt ovary details: Normal Method ====== Transabdominal ultrasound  examination. View: Suboptimal view: limited by late gestational age    US fetal biophysical profile wo non stress testing    Result Date: 2024  Interpreted by: Cristy Akers Indication ======== Growth for Suspect Problem with Fetal Growth, Vaginal Bleeding History ====== General History Smoking: no Height 155 cm Height (ft) 5 ft Height (in) 1 in Previous Outcomes  7 Para 3 Children born living ?37w 2 Pregnancies delivered at term (T) 2 Pregnancies delivered  (P) 1 Abortions (A) 3 Living children (L) 3 Children born living <37w 1 Miscarriages 1 Ectopic 2 Other: Previous  Section Maternal Assessment ================= Height 155 cm Height (ft) 5 ft Height (in) 1 in Weight 77 kg Weight (lb) 170 lb Weight gain 0 kg Weight gain (lb) 0 lb BMI 32.12 kg/mï¿½ Physical Exam Initial weight (lb) 170 lb Pregnancy ========= Carpio pregnancy. Number of fetuses: 1 Dating ====== LMP on: 2023 Cycle: Very certain LMP, regular cycle GA by LMP 30 w + 5 d ANAMARIA by LMP: 2024 Conception: LMP GA by prior assessment 30 w + 5 d ANAMARIA by prior assessment: 2024 Ultrasound examination on: 2024 GA by U/S based upon: AC, BPD, Femur, HC GA by U/S 29 w + 6 d ANAMARIA by U/S: 10/4/2024 Assigned: based on the LMP, selected on 2024 Assigned GA 30 w + 5 d Assigned ANAMARIA: 2024 Fetal Growth Overview ================= Exam date        GA              BPD (mm)          HC (mm)             AC (mm)              FL (mm)             HL (mm)             EFW (g) 2024        20w 2d        43.9     13%        167.1    9%        151.6     47%        33.5    50%        32.3     52%        344    45% 2024        30w 5d        73.4     9%          270.5    2%        258.6    26%         58.6    32%                                 1512     21% Impression ========= -Appropriate fetal growth and AFV. EFW at the 21st%. -BPP Score is 8/8 (h/o vaginal bleeding in pregnancy) -No malformations were identified on a  limited survey Follow-up ======== A repeat evaluation has been scheduled in 4 weeks for fetal growth. Thank you allowing us to participate in the care of your patient General Evaluation ============== Cardiac activity present.  bpm. Fetal movements: visualized. Presentation: cephalic Placenta: Placental site: anterior, left, posterior, No Previa Seen Umbilical cord: Cord vessels: 3 vessel cord Amniotic fluid: Amount of AF: normal amount. MVP 6.9 cm. CIERRA 18.6 cm. Q1 6.3 cm, Q2 2.6 cm, Q3 6.9 cm, Q4 2.7 cm Fetal Biometry ============ Standard BPD 73.4 mm 29w 3d 9% Hadlock OFD 96.6 mm  19% INTERGROWTH-21st .5 mm 29w 4d 2% Hadlock Cerebellum tr 37.7 mm 30w 6d 57% Leary .6 mm 30w 0d 26% Hadlock Femur 58.6 mm 30w 4d 32% Hadlock HC / AC 1.05 EFW 1,512 g 29w 5d 21% Hadlock EFW (lb) 3 lb EFW (oz) 5 oz EFW by: Hadlock (BPD-HC-AC-FL) Extended  4.5 mm Head / Face / Neck Cephalic index 0.76  16% Nicolaides Extremities / Bony Struc FL / BPD 0.80 FL / HC 0.22 FL / AC 0.23 Other Structures  bpm Fetal Anatomy =========== Cranium: Normal Lateral ventricles: Normal Midline falx: Normal Cavum septi pellucidi: Normal Cerebellum: Normal Cisterna magna: Normal Head / Neck Rt lateral ventricle: Normal Lt lateral ventricle: Normal Thalami: Normal Cerebellar lobes: Normal Lips: Normal Profile: suboptimal Nose: Normal 4-chamber view: Normal RVOT view: Normal LVOT view: Normal 3-vessel view: suboptimal 3-vessel-trachea view: Normal Heart / Thorax Aortic arch view: Normal Cardiac axis: Normal Diaphragm: Normal Stomach: Normal Kidneys: Normal Bladder: visualized Genitals: Normal Abdomen Stomach: correct situs Rt kidney: Normal Lt kidney: Normal Large bowel: Normal Fetal sex: female Wants to know fetal sex: yes Biophysical Profile ============== 2: Fetal breathing movements 2: Gross body movements 2: Fetal tone 2: Amniotic fluid volume 8/8 Biophysical profile score Maternal Structures =============== Uterus / Cervix  Cervix: Visualized Cervix details: normal Ovaries / Tubes / Adnexa Rt ovary: Visualized Rt ovary details: Normal Lt ovary: Visualized Lt ovary details: Normal Method ====== Transabdominal ultrasound examination. View: Suboptimal view: limited by late gestational age Indication ======== Growth for Suspect Problem with Fetal Growth, Vaginal Bleeding History ====== General History Smoking: no Height 155 cm Height (ft) 5 ft Height (in) 1 in Previous Outcomes  7 Para 3 Children born living ?37w 2 Pregnancies delivered at term (T) 2 Pregnancies delivered  (P) 1 Abortions (A) 3 Living children (L) 3 Children born living <37w 1 Miscarriages 1 Ectopic 2 Other: Previous  Section Maternal Assessment ================= Height 155 cm Height (ft) 5 ft Height (in) 1 in Weight 77 kg Weight (lb) 170 lb Weight gain 0 kg Weight gain (lb) 0 lb BMI 32.12 kg/mï¿½ Physical Exam Initial weight (lb) 170 lb Pregnancy ========= Carpio pregnancy. Number of fetuses: 1 Dating ====== LMP on: 2023 Cycle: Very certain LMP, regular cycle GA by LMP 30 w + 5 d ANAMARIA by LMP: 2024 Conception: LMP GA by prior assessment 30 w + 5 d ANAMARIA by prior assessment: 2024 Ultrasound examination on: 2024 GA by U/S based upon: AC, BPD, Femur, HC GA by U/S 29 w + 6 d ANAMARIA by U/S: 10/4/2024 Assigned: based on the LMP, selected on 2024 Assigned GA 30 w + 5 d Assigned ANAMARIA: 2024 Fetal Growth Overview ================= Exam date        GA              BPD (mm)          HC (mm)             AC (mm)              FL (mm)             HL (mm)             EFW (g) 2024        20w 2d        43.9     13%        167.1    9%        151.6     47%        33.5    50%        32.3     52%        344    45% 2024        30w 5d        73.4     9%          270.5    2%        258.6    26%         58.6    32%                                 1512     21% Impression ========= -Appropriate fetal growth and AFV. EFW at the 21st%. -BPP  Score is 8/8 (h/o vaginal bleeding in pregnancy) -No malformations were identified on a limited survey Follow-up ======== A repeat evaluation has been scheduled in 4 weeks for fetal growth. Thank you allowing us to participate in the care of your patient General Evaluation ============== Cardiac activity present.  bpm. Fetal movements: visualized. Presentation: cephalic Placenta: Placental site: anterior, left, posterior, No Previa Seen Umbilical cord: Cord vessels: 3 vessel cord Amniotic fluid: Amount of AF: normal amount. MVP 6.9 cm. CIERRA 18.6 cm. Q1 6.3 cm, Q2 2.6 cm, Q3 6.9 cm, Q4 2.7 cm Fetal Biometry ============ Standard BPD 73.4 mm 29w 3d 9% Hadlock OFD 96.6 mm  19% INTERGROWTH-21st .5 mm 29w 4d 2% Hadlock Cerebellum tr 37.7 mm 30w 6d 57% Leary .6 mm 30w 0d 26% Hadlock Femur 58.6 mm 30w 4d 32% Hadlock HC / AC 1.05 EFW 1,512 g 29w 5d 21% Hadlock EFW (lb) 3 lb EFW (oz) 5 oz EFW by: Hadlock (BPD-HC-AC-FL) Extended  4.5 mm Head / Face / Neck Cephalic index 0.76  16% Nicolaides Extremities / Bony Struc FL / BPD 0.80 FL / HC 0.22 FL / AC 0.23 Other Structures  bpm Fetal Anatomy =========== Cranium: Normal Lateral ventricles: Normal Midline falx: Normal Cavum septi pellucidi: Normal Cerebellum: Normal Cisterna magna: Normal Head / Neck Rt lateral ventricle: Normal Lt lateral ventricle: Normal Thalami: Normal Cerebellar lobes: Normal Lips: Normal Profile: suboptimal Nose: Normal 4-chamber view: Normal RVOT view: Normal LVOT view: Normal 3-vessel view: suboptimal 3-vessel-trachea view: Normal Heart / Thorax Aortic arch view: Normal Cardiac axis: Normal Diaphragm: Normal Stomach: Normal Kidneys: Normal Bladder: visualized Genitals: Normal Abdomen Stomach: correct situs Rt kidney: Normal Lt kidney: Normal Large bowel: Normal Fetal sex: female Wants to know fetal sex: yes Biophysical Profile ============== 2: Fetal breathing movements 2: Gross body movements 2: Fetal tone 2: Amniotic fluid  volume  Biophysical profile score Maternal Structures =============== Uterus / Cervix Cervix: Visualized Cervix details: normal Ovaries / Tubes / Adnexa Rt ovary: Visualized Rt ovary details: Normal Lt ovary: Visualized Lt ovary details: Normal Method ====== Transabdominal ultrasound examination. View: Suboptimal view: limited by late gestational age            Assessment/Plan   Principal Problem:    Indication for care in labor and delivery, antepartum ([facility])  Active Problems:    Severe pre-eclampsia in third trimester ([facility])    Gastroesophageal reflux disease    Hematoma of rectus sheath    Assessment:    Jacqueline Ambriz is a 34 y.o. female with a history of preeclampsia with severe features this pregnancy now s/p repeat  on () and take back to OR with OB on (8/3) for abdominal distension and tenderness, drop in Hgb, and intraabdominal fluid on bedside ultrasound. Found to have large rectal sheath hematoma (6x6 cm appox. 500 cc) and 400 ml intraperitoneal blood evacuated intra-op, no active bleed or hematoma expansion.   Pt admitted to SICU post op (8/3) for further management while pending CTA Abdomen w/ possible IR embolization -> CTA was unremarkable, no IR intervention recommended.  Patient extuabted and hemodynamically stable overnight.      Plan:  NEURO:   Hx of Anxiety. Pt arrived to SICU intubated and sedated on Propofol infusion following Ex-lap on (8/3), now extubated and off sedation   - Ongoing neuro and pain assessments  - PRN Hydromorphone  - Lidoderm patches surrounding surgical incision  - PT/OT consult  - does not take home anxiety meds      CV:   History of preeclampsia with severe features this pregnancy based on BP criteria. Pt arrived to SICU HDS and off pressors. /90s, HR 95-110s.  - restart Labetalol 400mg BID and Nifedipine 60mg BID now that hemodynamically stable, slightly hypertensive, and tachycardic   - Continuous EKG/abp monitoring  - Goal map range  65-90  - Home meds: Labetalol 400mg BID + Nifedipine 60mg BID   - Magnesium infusion for Pre-eclampsia discontinued given Magnesium toxicity (Mg 8.9, now 4.26)     PULM:   No prior hx, not a current smoker. Received Lasix 20 () for concern for pulm edema. Now on room air.   - Q1h incentive spirometer while awake  - Additional pulm toilet prn.    - CXR without acute cardiopulmonary findings      GI:   Hx of pregnancy-related GERD, previously on Famotidine. Pre-eclampsia w/ severe features. Repeat LT  on  following by Ex-Lap for hemoperitoneum/rectus sheath hematoma on 8/3.   - clear liquid diet   - Advance diet per surgical service  - PPI for GI prophylaxis  - Daily LFTs given SiPEC - normal       OBGYN:    female at 31wks (baby currently in NICU) c/b preeclampsia with SF, repeat   on (), c/b hemoperitoneum/rectus sheath hematoma (EBL 1L)  (8/3)  - No need to continue Magnesium infusion  - Breast pump for lactation simulation, consult lactation as needed, breast pump bedside   - OBGYN recs appreciated      :   Baseline Cr .8-1.  ELMIRA (Serum Cr 0.97->1.56) w/ low UOP, arguello in place, c/f bladder injury ruled out during OR take back 8/3. Received Lasix 20 mg () for pulm edema.   Hyperkalemic 6.5 now s/p intra-op K cocktail -> potassium 4.5 this AM  - s/p Lokelma 10 g   - Volume resuscitation as needed  - Maintain U/O >0.5ml/kg/hr  - Check renal function panel post op and daily  - magnesium this AM 4.26  - Replete electrolytes to goal K>4, Mg>2, Phos>2.5, ionized Ca>1.10.     HEME:   Acute blood loss anemia. Pre-op Hgb 11 -> 6.9 on POD1 (8/3). Intra-op OR take back EBL 1L. Intra-op received 3 RBC, 2FFP.  Thrombocytopenia w/ pregnancy and pre-eclampsia, Plt 101 this AM, Hgb 8.1.   - repeat CBC this afternoon around 1300   - CTA unremarkable -> no IR intervention indicated   - Check CBC and coags post op and daily  - SCDs for DVT prophylaxis  - Holding SC Lovenox for  now  - Ongoing monitoring for s/s bleeding  - Maintain active T&S      ENDO:   No history DM or thyroid disease.  - Q4h BG and SSI Lispro per ICU protocol.     ID:   Afebrile. Received Amp/Gent/Unasyn during delivery and received 1 dose after delivery. Ancef 2g for Ex-lap.   - Temp q4h, wbc daily  - Ongoing monitoring for s/s infection     Lines:  - R radial arterial line (8/3 - removed 8/4)  - PIVx2  - Ahumada (8/3)  - OGT (8/3) removed 8/3  - ETT (8/3) extubated overnight 8/3        Dispo: ICU care for now. Can transfer to MAC if Hgb and PLT stable on afternoon CBC.  Discussed with ICU attending Dr. Trish Post DO

## 2024-08-04 NOTE — SIGNIFICANT EVENT
08/04/24 0305   Daily Screen   Total RSBI 33.4   Weaning Parameters   Weaning Start Time 0220   Weaning Vital Capacity 883 mL   Negative Inspiratory Force (NIF) 47   Spontaneous Minute Volume (MV) 8.8   Weaning Tidal Volume 419 mL   Respiratory Depth/Rhythm Regular     Rate 14

## 2024-08-04 NOTE — ANESTHESIA POSTPROCEDURE EVALUATION
Patient: Jacqueline Ambriz    Procedure Summary       Date: 08/03/24 Room / Location: [facility] 01 / [facility] OB    Anesthesia Start: 1806 Anesthesia Stop: 2210    Procedure: Exploration Laparotomy (Abdomen) Diagnosis:       Hematoma of rectus sheath, initial encounter      (Hematoma of rectus sheath, initial encounter [S30.1XXA])    Surgeons: Katy Nieto MD Responsible Provider: Komal Allen MD    Anesthesia Type: general ASA Status: 3 - Emergent            Anesthesia Type: general    Vitals Value Taken Time   /83 08/03/24 2232   Temp 36.0 08/03/24 2259   Pulse 88 08/03/24 2259   Resp 22 08/03/24 2259   SpO2 94 % 08/03/24 2259   Vitals shown include unfiled device data.    Anesthesia Post Evaluation    Patient location during evaluation: ICU  Patient participation: complete - patient cannot participate  Level of consciousness: sedated  Pain score: 0  Pain management: adequate  Airway patency: patent  Cardiovascular status: acceptable and hemodynamically stable  Respiratory status: acceptable, intubated and ventilator  Hydration status: acceptable  Postoperative Nausea and Vomiting: none  Comments: Pt transported to SICU intubated and sedated and on monitors. No issues during transport. Blood cooler given to SICU nursing containing 1 pRBC under ice and 2 cryo in bag outside of cooler. Signout given to SICU team. All questions answered and concerns addressed.        No notable events documented.

## 2024-08-04 NOTE — PROGRESS NOTES
"Postpartum Progress Note    Subjective  To bedside to check in on patient this morning in the TSICU. Now extubated, on 3L supplmental O2. Conversational and able to ask/answer questions this AM. Pain well controlled. Asked how case went and if baby okay in the NICU.    Objective  /82   Pulse 83   Temp 37.1 °C (98.8 °F) (Temporal)   Resp 19   Ht 1.549 m (5' 1\")   Wt 88 kg (194 lb 0.1 oz)   LMP 2023   SpO2 99%   Breastfeeding Yes   BMI 36.66 kg/m²   Gen: Alert, oriented x 3, extubated  Head: normocephalic, atraumatic.   CV: Warm and well perfused, normal rate, regular rhythm  Lungs: Normal work of breathing  Abdomen: distended and firm postoperative. Nontender to palpation. Pfannenstiel incision covered with pressure dressing and abdominal binder without surrounding skin changes or bleeding  : arguello in place draining yellow urine  Skin: warm, dry  Extremities: RUE swelling, peripheral IV in place  Neuro: awake, responsive, no gross focal deficits appreciated    Results from last 7 days   Lab Units 24  0647 24  0529 24  02124  1637   WBC AUTO x10*3/uL 15.3* 15.0* 15.0* 15.1* 15.2*   HEMOGLOBIN g/dL 8.0* 8.1* 9.1* 8.5* 6.9*   HEMATOCRIT % 22.4* 22.5* 25.4* 23.4* 20.8*   PLATELETS AUTO x10*3/uL 101* 102* 99* 98* 138*   PROTIME seconds  --   --  10.7 11.0 11.3   INR   --   --  1.0 1.0 1.0   FIBRINOGEN mg/dL  --   --  487* 397 471*      Results from last 7 days   Lab Units 24  0647 24  0529 24  0219 24  2204 24  1637 24  1426   WBC AUTO x10*3/uL 15.3* 15.0* 15.0* 15.1* 15.2*  --    HEMOGLOBIN g/dL 8.0* 8.1* 9.1* 8.5* 6.9*  --    HEMATOCRIT % 22.4* 22.5* 25.4* 23.4* 20.8*  --    PLATELETS AUTO x10*3/uL 101* 102* 99* 98* 138*  --    AST U/L  --   --  28  --  31  31 31   ALT U/L  --   --  22  --  26  26 23   CREATININE mg/dL  --  1.21* 1.31* 1.56* 1.56* 1.42*       Assessment/Plan  33yo  now POD2 s/p rCS and POD1 s/p " takeback and evaluation of rectus sheath hematoma in the setting of postoperative drop in hemoglobin, ELMIRA and hyperkalemia. Currently admitted to SICU postoperatively extubated and stable.     Rectus Sheath Hematoma  Postoperative Care  Noted in OR on 8/3 to be 6x6cm nonexpanding left rectus sheath hematoma with 400mL hemoperitoneum evacuated  ACS consulted intraoperatively, recommended observation and serial hemoglobins with possible IR embolization   IR aware. CTA without evidence of active extravasation, deferring inferior epigastric embolization at this time  S/p 3u pRBC and 2u FFP intraoperatively. Hgb stable at 9.1 > 9.3 > 8.0 > 8.1, Platelets stable at 98 > 99> 102 >101, fibrinogen 487  Goal Hgb >7, Platelets >50k, Fibrinogen >300  UOP adequate, metabolic acidosis resolving  Pt now extubated, on 3L supplemental O2. Currently NPO, will advance diet slowly as tolerated given risk for postoperative ileus. Continue to monitor  Appreciate SICU care--Will communicate with team today [facility]  Will continue serial abdominal exams and serial labs. No current indication for IR embolization, though low threshold to re-engage IR if clinical worsening.    Pre-eclampsia with severe features  Goal blood pressures postpartum<160/110  Can administer IV labetalol 20mg, hydralazine 5mg for acute hypertension  Current regimen: nifedipine 60mg BID, labetalol 400mg BID (currently held). Recommend restarting once BPs have been stable  S/p 24 hours of postpartum magnesium for seizure prophylaxis. If seizure noted while in SICU, recommend 6g bolus of IV magnesium.  Labs notable for ELMIRA as below, improving    Hyperkalemia  ELMIRA  Hyperkalemia improving, most recently 4.5 (as high as 6.5, s/p calcium gluconate, insulin, lokelma)  Cr stable, most recently 1.21. No evidence of bladder injury during takeback. Suspect prerenal injury secondary to blood loss and hemoperitoneum  EKG with prolonged QT,  no T wave abnormalities  Continue mIVF and serial labs to monitor  Continue to monitor UOP, most recently 4.6mL/kg/hr    Postpartum care  Breast pump at bedside, lactation consultation as needed  Baby in NICU    Discussed with Dr. Karthik Haddad MD PGY-4  OB/GYN

## 2024-08-04 NOTE — OP NOTE
Date: 8/3/2024  OR Location: [facility] OB    Name: Jacqueline Ambriz, : 1990, Age: 34 y.o., MRN: [id number], Sex: female    Diagnosis  Pre-op Diagnosis      * Hematoma of rectus sheath, initial encounter [S30.1XXA] Post-op Diagnosis     * Hematoma of rectus sheath, initial encounter [S30.1XXA]     Procedures  Exploration Laparotomy  36620 - MA EXPLORATORY LAPAROTOMY CELIOTOMY W/WO BIOPSY SPX      Surgeons      * Katy Nieto - Primary  Josefina Najera MD    Resident/Fellow/Other Assistant:  Surgeons and Role:     * Razia Bolton MD - Resident - Assisting     * Park Dorman MD - Resident - Assisting    Procedure Summary  Anesthesia: General  ASA: III  Anesthesia Staff: Anesthesiologist: Komal Allen MD  Anesthesia Resident: Azul Balderrama MD  Estimated Blood Loss: 1081mL  Intra-op Medications:   Administrations occurring from 1606 to 2144 on 24:   Medication Name Total Dose   enoxaparin (Lovenox) syringe 40 mg 40 mg   famotidine (Pepcid) tablet 20 mg 140 mg   labetalol (Normodyne) tablet 400 mg 800 mg   lactated Ringer's infusion 1,963.42 mL   magnesium sulfate 20 gram/500 mL (4 %) infusion 44.93 g   NIFEdipine ER (Adalat CC) 24 hr tablet 60 mg 540 mg   prenatal vitamin (iron-folic) tablet 1 tablet 2 tablet   acetaminophen (Tylenol) tablet 975 mg 1,950 mg   calcium carbonate (Tums) chewable tablet 500 mg 1,500 mg   HYDROmorphone (Dilaudid) injection 0.2 mg 0.2 mg              Anesthesia Record               Intraprocedure I/O Totals          Intake    PLASMA 300.00 mL    PRBC 700.00 mL    Transfuse Plasma :1 Hour 300.00 mL    Transfuse RBC :1 Hour 350.00 mL    LR bolus 1500.00 mL    Phenylephrine Drip 0.00 mL    The total shown is the total volume documented since Anesthesia Start was filed.    Total Intake 3150 mL       Output    Urine 10 mL    Total Blood Loss - Surgical Delivery (mL) 1081 mL    Total Output 1091 mL       Net    Net Volume 2059 mL       Other (could not be determined as input or  output)    Surgical Delivery Estimated Blood Loss (mL) 1081          Specimen: No specimens collected     Staff:   Scrub Person: Georgiana  Scrub Person: Haley         Procedure Details:  I was called to the OR where patient was prepped and draped with prior pfannenstiel incision opened. Preoperative antibiotics have been ordered and given within 1 hours of incision. Venous thrombosis prophylaxis have been ordered including bilateral sequential compression devices. Approximately 400 ml of hemoperitoneum was evacuated on entry. Abdominal exploration revealed the below findings. Normal appearing uterus, fallopian tubes and ovaries. Uterus with good tone. Hysterotomy hemostatic. Bladder was edematous with small stable ecchymosis. Slow ooze overlying the bladder. Single figure of eight suture thrown with improved hemostasis. Bladder backfilled with 300 ml sterile milk with no active extravasation noted. Further exploration revealed large 6 x 6 cm L rectus sheath hematoma observed > 10 minutes without expansion. ACS was called to the OR with agreement to defer ligating the inferior epigastric and opening of the hematoma. Decision made to proceed with closure, obtain a CT angio and consult IR for consideration of embolization. The fascia was closed with #1 looped PDS. The subcutaneous layer was re approximated with a running stitch of 3-0 vicryl and the skin was closed with 4-0 Monocryl. Pressure dressing was applied and she was transferred to the SICU for close observation.     Findings: Approximately 400 mL of hemoperitoneum was evacuated on entry. Exploration notable for L rectus sheath hematoma. Extending inferior and measuring approximately 6 x 6 cm without expansion. Bladder edematous with slow ooze. Normal appearing fallopian tubes and ovaries. Hysterotomy without active bleeding.      Complications:   Acute blood loss anemia      Disposition: ICU - intubated and hemodynamically stable.  Condition: stable

## 2024-08-04 NOTE — SIGNIFICANT EVENT
Interventional Radiology Consult    Briefly, patient is a 34 year-old female who is POD #1 from  with acute hemoglobin drop found to have a rectus sheath hematoma requiring blood transfusion. IR was consulted for possible embolization.    CTA abdomen pelvis was completed on 2024 at 00:08 which demonstrated a large rectus sheath hematoma with no active extravasation or pseudoaneurysm evident. The patient is currently hemodynamically stable. As there is no active extravasation to target for embolization, no IR intervention at this time. Please continue to monitor the patient closely and reach out if the patient becomes hemodynamically unstable.       Rudy Valverde MD  Diagnostic Radiology, PGY-5, R4    NON-Urgent on call weekends and after hours weekdays (5pm - 5am) IR pager: [phone]  Urgent & emergent on call weekends and after hours weekdays (5pm-7am) IR pager: [phone]

## 2024-08-05 LAB
ALBUMIN SERPL BCP-MCNC: 2.8 G/DL (ref 3.4–5)
ALP SERPL-CCNC: 77 U/L (ref 33–110)
ALT SERPL W P-5'-P-CCNC: 42 U/L (ref 7–45)
ANION GAP SERPL CALC-SCNC: 12 MMOL/L (ref 10–20)
APTT PPP: 27 SECONDS (ref 27–38)
AST SERPL W P-5'-P-CCNC: 71 U/L (ref 9–39)
BILIRUB SERPL-MCNC: 0.4 MG/DL (ref 0–1.2)
BUN SERPL-MCNC: 18 MG/DL (ref 6–23)
CALCIUM SERPL-MCNC: 7.9 MG/DL (ref 8.6–10.6)
CHLORIDE SERPL-SCNC: 106 MMOL/L (ref 98–107)
CO2 SERPL-SCNC: 25 MMOL/L (ref 21–32)
CREAT SERPL-MCNC: 0.92 MG/DL (ref 0.5–1.05)
EGFRCR SERPLBLD CKD-EPI 2021: 83 ML/MIN/1.73M*2
ERYTHROCYTE [DISTWIDTH] IN BLOOD BY AUTOMATED COUNT: 17.1 % (ref 11.5–14.5)
FIBRINOGEN PPP-MCNC: 615 MG/DL (ref 200–400)
GLUCOSE SERPL-MCNC: 71 MG/DL (ref 74–99)
HCT VFR BLD AUTO: 25 % (ref 36–46)
HGB BLD-MCNC: 8.2 G/DL (ref 12–16)
INR PPP: 0.9 (ref 0.9–1.1)
MAGNESIUM SERPL-MCNC: 2.65 MG/DL (ref 1.6–2.4)
MCH RBC QN AUTO: 27.9 PG (ref 26–34)
MCHC RBC AUTO-ENTMCNC: 32.8 G/DL (ref 32–36)
MCV RBC AUTO: 85 FL (ref 80–100)
NRBC BLD-RTO: 0.3 /100 WBCS (ref 0–0)
PLATELET # BLD AUTO: 117 X10*3/UL (ref 150–450)
POTASSIUM SERPL-SCNC: 4.6 MMOL/L (ref 3.5–5.3)
PROT SERPL-MCNC: 5.2 G/DL (ref 6.4–8.2)
PROTHROMBIN TIME: 10.5 SECONDS (ref 9.8–12.8)
RBC # BLD AUTO: 2.94 X10*6/UL (ref 4–5.2)
SODIUM SERPL-SCNC: 138 MMOL/L (ref 136–145)
WBC # BLD AUTO: 16.4 X10*3/UL (ref 4.4–11.3)

## 2024-08-05 PROCEDURE — 85384 FIBRINOGEN ACTIVITY: CPT | Performed by: STUDENT IN AN ORGANIZED HEALTH CARE EDUCATION/TRAINING PROGRAM

## 2024-08-05 PROCEDURE — 2500000001 HC RX 250 WO HCPCS SELF ADMINISTERED DRUGS (ALT 637 FOR MEDICARE OP): Performed by: STUDENT IN AN ORGANIZED HEALTH CARE EDUCATION/TRAINING PROGRAM

## 2024-08-05 PROCEDURE — 85610 PROTHROMBIN TIME: CPT | Performed by: STUDENT IN AN ORGANIZED HEALTH CARE EDUCATION/TRAINING PROGRAM

## 2024-08-05 PROCEDURE — 99232 SBSQ HOSP IP/OBS MODERATE 35: CPT | Performed by: STUDENT IN AN ORGANIZED HEALTH CARE EDUCATION/TRAINING PROGRAM

## 2024-08-05 PROCEDURE — 36415 COLL VENOUS BLD VENIPUNCTURE: CPT | Performed by: STUDENT IN AN ORGANIZED HEALTH CARE EDUCATION/TRAINING PROGRAM

## 2024-08-05 PROCEDURE — 2500000004 HC RX 250 GENERAL PHARMACY W/ HCPCS (ALT 636 FOR OP/ED): Performed by: STUDENT IN AN ORGANIZED HEALTH CARE EDUCATION/TRAINING PROGRAM

## 2024-08-05 PROCEDURE — 83735 ASSAY OF MAGNESIUM: CPT | Performed by: STUDENT IN AN ORGANIZED HEALTH CARE EDUCATION/TRAINING PROGRAM

## 2024-08-05 PROCEDURE — 80053 COMPREHEN METABOLIC PANEL: CPT | Performed by: STUDENT IN AN ORGANIZED HEALTH CARE EDUCATION/TRAINING PROGRAM

## 2024-08-05 PROCEDURE — 2500000002 HC RX 250 W HCPCS SELF ADMINISTERED DRUGS (ALT 637 FOR MEDICARE OP, ALT 636 FOR OP/ED): Performed by: STUDENT IN AN ORGANIZED HEALTH CARE EDUCATION/TRAINING PROGRAM

## 2024-08-05 PROCEDURE — 1210000001 HC SEMI-PRIVATE ROOM DAILY

## 2024-08-05 PROCEDURE — 85027 COMPLETE CBC AUTOMATED: CPT | Performed by: STUDENT IN AN ORGANIZED HEALTH CARE EDUCATION/TRAINING PROGRAM

## 2024-08-05 RX ORDER — LABETALOL HYDROCHLORIDE 5 MG/ML
20 INJECTION, SOLUTION INTRAVENOUS ONCE AS NEEDED
Status: DISCONTINUED | OUTPATIENT
Start: 2024-08-05 | End: 2024-08-08 | Stop reason: HOSPADM

## 2024-08-05 RX ORDER — LOPERAMIDE HYDROCHLORIDE 2 MG/1
4 CAPSULE ORAL EVERY 2 HOUR PRN
Status: DISCONTINUED | OUTPATIENT
Start: 2024-08-05 | End: 2024-08-08 | Stop reason: HOSPADM

## 2024-08-05 RX ORDER — METOCLOPRAMIDE 10 MG/1
10 TABLET ORAL EVERY 6 HOURS PRN
Status: DISCONTINUED | OUTPATIENT
Start: 2024-08-05 | End: 2024-08-08 | Stop reason: HOSPADM

## 2024-08-05 RX ORDER — ONDANSETRON HYDROCHLORIDE 2 MG/ML
4 INJECTION, SOLUTION INTRAVENOUS EVERY 6 HOURS PRN
Status: DISCONTINUED | OUTPATIENT
Start: 2024-08-05 | End: 2024-08-08 | Stop reason: HOSPADM

## 2024-08-05 RX ORDER — HYDRALAZINE HYDROCHLORIDE 20 MG/ML
5 INJECTION INTRAMUSCULAR; INTRAVENOUS ONCE AS NEEDED
Status: DISCONTINUED | OUTPATIENT
Start: 2024-08-05 | End: 2024-08-08 | Stop reason: HOSPADM

## 2024-08-05 RX ORDER — BISACODYL 10 MG/1
10 SUPPOSITORY RECTAL DAILY PRN
Status: DISCONTINUED | OUTPATIENT
Start: 2024-08-05 | End: 2024-08-08 | Stop reason: HOSPADM

## 2024-08-05 RX ORDER — POLYETHYLENE GLYCOL 3350 17 G/17G
17 POWDER, FOR SOLUTION ORAL 2 TIMES DAILY PRN
Status: DISCONTINUED | OUTPATIENT
Start: 2024-08-05 | End: 2024-08-08 | Stop reason: HOSPADM

## 2024-08-05 RX ORDER — ADHESIVE BANDAGE
10 BANDAGE TOPICAL
Status: DISCONTINUED | OUTPATIENT
Start: 2024-08-05 | End: 2024-08-08 | Stop reason: HOSPADM

## 2024-08-05 RX ORDER — METOCLOPRAMIDE HYDROCHLORIDE 5 MG/ML
10 INJECTION INTRAMUSCULAR; INTRAVENOUS EVERY 6 HOURS PRN
Status: DISCONTINUED | OUTPATIENT
Start: 2024-08-05 | End: 2024-08-08 | Stop reason: HOSPADM

## 2024-08-05 RX ORDER — CARBOPROST TROMETHAMINE 250 UG/ML
250 INJECTION, SOLUTION INTRAMUSCULAR ONCE AS NEEDED
Status: DISCONTINUED | OUTPATIENT
Start: 2024-08-05 | End: 2024-08-08 | Stop reason: HOSPADM

## 2024-08-05 RX ORDER — OXYTOCIN 10 [USP'U]/ML
10 INJECTION, SOLUTION INTRAMUSCULAR; INTRAVENOUS ONCE AS NEEDED
Status: DISCONTINUED | OUTPATIENT
Start: 2024-08-05 | End: 2024-08-08 | Stop reason: HOSPADM

## 2024-08-05 RX ORDER — ACETAMINOPHEN 325 MG/1
975 TABLET ORAL EVERY 6 HOURS
Status: DISCONTINUED | OUTPATIENT
Start: 2024-08-05 | End: 2024-08-08 | Stop reason: HOSPADM

## 2024-08-05 RX ORDER — DIPHENHYDRAMINE HCL 25 MG
25 CAPSULE ORAL EVERY 4 HOURS PRN
Status: DISCONTINUED | OUTPATIENT
Start: 2024-08-05 | End: 2024-08-08 | Stop reason: HOSPADM

## 2024-08-05 RX ORDER — IBUPROFEN 600 MG/1
600 TABLET ORAL EVERY 6 HOURS
Status: CANCELLED | OUTPATIENT
Start: 2024-08-06

## 2024-08-05 RX ORDER — HYDROMORPHONE HYDROCHLORIDE 1 MG/ML
0.2 INJECTION, SOLUTION INTRAMUSCULAR; INTRAVENOUS; SUBCUTANEOUS EVERY 5 MIN PRN
Status: DISCONTINUED | OUTPATIENT
Start: 2024-08-05 | End: 2024-08-08 | Stop reason: HOSPADM

## 2024-08-05 RX ORDER — NIFEDIPINE 10 MG/1
10 CAPSULE ORAL ONCE AS NEEDED
Status: DISCONTINUED | OUTPATIENT
Start: 2024-08-05 | End: 2024-08-08 | Stop reason: HOSPADM

## 2024-08-05 RX ORDER — TRANEXAMIC ACID 100 MG/ML
1000 INJECTION, SOLUTION INTRAVENOUS ONCE AS NEEDED
Status: DISCONTINUED | OUTPATIENT
Start: 2024-08-05 | End: 2024-08-08 | Stop reason: HOSPADM

## 2024-08-05 RX ORDER — OXYCODONE HYDROCHLORIDE 5 MG/1
10 TABLET ORAL EVERY 4 HOURS PRN
Status: DISCONTINUED | OUTPATIENT
Start: 2024-08-06 | End: 2024-08-08 | Stop reason: HOSPADM

## 2024-08-05 RX ORDER — AMOXICILLIN 250 MG
2 CAPSULE ORAL NIGHTLY PRN
Status: DISCONTINUED | OUTPATIENT
Start: 2024-08-05 | End: 2024-08-08 | Stop reason: HOSPADM

## 2024-08-05 RX ORDER — MISOPROSTOL 200 UG/1
800 TABLET ORAL ONCE AS NEEDED
Status: DISCONTINUED | OUTPATIENT
Start: 2024-08-05 | End: 2024-08-08 | Stop reason: HOSPADM

## 2024-08-05 RX ORDER — ONDANSETRON 4 MG/1
4 TABLET, FILM COATED ORAL EVERY 6 HOURS PRN
Status: DISCONTINUED | OUTPATIENT
Start: 2024-08-05 | End: 2024-08-08 | Stop reason: HOSPADM

## 2024-08-05 RX ORDER — KETOROLAC TROMETHAMINE 30 MG/ML
30 INJECTION, SOLUTION INTRAMUSCULAR; INTRAVENOUS EVERY 6 HOURS
Status: DISCONTINUED | OUTPATIENT
Start: 2024-08-05 | End: 2024-08-05

## 2024-08-05 RX ORDER — LIDOCAINE 560 MG/1
1 PATCH PERCUTANEOUS; TOPICAL; TRANSDERMAL
Status: DISCONTINUED | OUTPATIENT
Start: 2024-08-05 | End: 2024-08-08 | Stop reason: HOSPADM

## 2024-08-05 RX ORDER — SIMETHICONE 80 MG
80 TABLET,CHEWABLE ORAL 4 TIMES DAILY PRN
Status: DISCONTINUED | OUTPATIENT
Start: 2024-08-05 | End: 2024-08-08 | Stop reason: HOSPADM

## 2024-08-05 RX ORDER — OXYTOCIN/0.9 % SODIUM CHLORIDE 30/500 ML
60 PLASTIC BAG, INJECTION (ML) INTRAVENOUS ONCE AS NEEDED
Status: DISCONTINUED | OUTPATIENT
Start: 2024-08-05 | End: 2024-08-08 | Stop reason: HOSPADM

## 2024-08-05 RX ORDER — OXYCODONE HYDROCHLORIDE 5 MG/1
5 TABLET ORAL EVERY 4 HOURS PRN
Status: DISCONTINUED | OUTPATIENT
Start: 2024-08-06 | End: 2024-08-08 | Stop reason: HOSPADM

## 2024-08-05 RX ORDER — DIPHENHYDRAMINE HYDROCHLORIDE 50 MG/ML
25 INJECTION INTRAMUSCULAR; INTRAVENOUS EVERY 4 HOURS PRN
Status: DISCONTINUED | OUTPATIENT
Start: 2024-08-05 | End: 2024-08-08 | Stop reason: HOSPADM

## 2024-08-05 RX ORDER — NALOXONE HYDROCHLORIDE 0.4 MG/ML
0.1 INJECTION, SOLUTION INTRAMUSCULAR; INTRAVENOUS; SUBCUTANEOUS EVERY 5 MIN PRN
Status: DISCONTINUED | OUTPATIENT
Start: 2024-08-05 | End: 2024-08-08 | Stop reason: HOSPADM

## 2024-08-05 RX ORDER — KETOROLAC TROMETHAMINE 30 MG/ML
30 INJECTION, SOLUTION INTRAMUSCULAR; INTRAVENOUS EVERY 6 HOURS
Status: CANCELLED | OUTPATIENT
Start: 2024-08-05 | End: 2024-08-06

## 2024-08-05 RX ORDER — METHYLERGONOVINE MALEATE 0.2 MG/ML
0.2 INJECTION INTRAVENOUS ONCE AS NEEDED
Status: DISCONTINUED | OUTPATIENT
Start: 2024-08-05 | End: 2024-08-08 | Stop reason: HOSPADM

## 2024-08-05 RX ORDER — IBUPROFEN 600 MG/1
600 TABLET ORAL EVERY 6 HOURS
Status: DISCONTINUED | OUTPATIENT
Start: 2024-08-06 | End: 2024-08-05

## 2024-08-05 RX ADMIN — ACETAMINOPHEN 975 MG: 325 TABLET ORAL at 20:54

## 2024-08-05 RX ADMIN — LABETALOL HYDROCHLORIDE 400 MG: 100 TABLET, FILM COATED ORAL at 20:54

## 2024-08-05 RX ADMIN — ACETAMINOPHEN 975 MG: 325 TABLET ORAL at 08:52

## 2024-08-05 RX ADMIN — ACETAMINOPHEN 975 MG: 325 TABLET ORAL at 03:00

## 2024-08-05 RX ADMIN — NIFEDIPINE 60 MG: 60 TABLET, EXTENDED RELEASE ORAL at 08:52

## 2024-08-05 RX ADMIN — ACETAMINOPHEN 975 MG: 325 TABLET ORAL at 15:15

## 2024-08-05 RX ADMIN — SIMETHICONE 80 MG: 80 TABLET, CHEWABLE ORAL at 08:58

## 2024-08-05 RX ADMIN — NIFEDIPINE 60 MG: 60 TABLET, EXTENDED RELEASE ORAL at 20:55

## 2024-08-05 RX ADMIN — POLYETHYLENE GLYCOL 3350 17 G: 17 POWDER, FOR SOLUTION ORAL at 18:37

## 2024-08-05 RX ADMIN — OXYCODONE HYDROCHLORIDE 10 MG: 5 TABLET ORAL at 23:30

## 2024-08-05 RX ADMIN — SIMETHICONE 80 MG: 80 TABLET, CHEWABLE ORAL at 18:37

## 2024-08-05 RX ADMIN — LABETALOL HYDROCHLORIDE 400 MG: 100 TABLET, FILM COATED ORAL at 08:52

## 2024-08-05 ASSESSMENT — PAIN SCALES - GENERAL
PAINLEVEL_OUTOF10: 0 - NO PAIN
PAINLEVEL_OUTOF10: 4
PAINLEVEL_OUTOF10: 9
PAINLEVEL_OUTOF10: 0 - NO PAIN
PAINLEVEL_OUTOF10: 8

## 2024-08-05 NOTE — LACTATION NOTE
Lactation Consultant Note  Lactation Consultation       Maternal Information       Maternal Assessment       Infant Assessment       Feeding Assessment       LATCH TOOL       Breast Pump       Other OB Lactation Tools       Patient Follow-up       Other OB Lactation Documentation       Recommendations/Summary  In to see patient. Patient currently not in the room. Letter left at bedside.

## 2024-08-05 NOTE — PROGRESS NOTES
[facility]  ACUTE CARE SURGERY - PROGRESS NOTE    Patient Name: Jacqueline Ambriz  MRN: [id number]  Admit Date: 729  : 1990  AGE: 34 y.o.   GENDER: female  ==============================================================================  TODAY'S ASSESSMENT AND PLAN OF CARE:  34F, POD#2 from , acute Hgb drop this afternoon. Taken back to the OR with OB. Noted to have rectus sheath hematoma. Received 3 RBC, 2 FFP. Intra-op consult. Patient HD stable no extrav on CTA.    Patient reported no pain, no pain on palpation.     - follow up CBC for today: stable  - Abdominal binder for comfort   - No surgical intervention for rectus sheath hematoma with stable H/H  - Please page us with questions    Patient will be discussed with Attending Physician Dr. Monk,    Naeem Delcid MD  General Surgery, PGY-3  Please page service pager with questions  [facility] [phone]    ==============================================================================  CHIEF COMPLAINT / EVENTS LAST 24HRS / HPI:  NAEO, denies any abdominal pain.    MEDICAL HISTORY / ROS:   Admission history and ROS reviewed. Pertinent changes as follows:      PHYSICAL EXAM:  Heart Rate:  []   Temp:  [36.8 °C (98.2 °F)-37.7 °C (99.9 °F)]   Resp:  [18-33]   BP: (125-144)/(79-92)   SpO2:  [93 %-98 %]     Constitutional: no acute distress  Neuro: A/O x4, no gross deficits   Psych: normal affect  HEENT: No deformities, no scleral icterus   Cardiac: RRR  Pulmonary: unlabored respirations   Abdomen: distended, no pain on palpation in all four quadrants, no overlying skin changes   Skin: warm and dry overall    Extremities: no swelling noted  MSK: moving all four          LABS:  Results from last 7 days   Lab Units 24  1348 24  0647 24  0529 24  0516 24  1633 24  0718   WBC AUTO x10*3/uL 14.0* 15.3* 15.0*   < > 9.9 10.0   HEMOGLOBIN g/dL 7.8* 8.0* 8.1*   < > 11.0* 11.9*   HEMATOCRIT  % 21.4* 22.4* 22.5*   < > 33.8* 36.5   PLATELETS AUTO x10*3/uL 89* 101* 102*   < > 131* 170   NEUTROS PCT AUTO %  --   --   --   --  67.2 69.5   LYMPHS PCT AUTO %  --   --   --   --  23.6 21.2   MONOS PCT AUTO %  --   --   --   --  7.1 7.2   EOS PCT AUTO %  --   --   --   --  0.4 0.3    < > = values in this interval not displayed.     Results from last 7 days   Lab Units 08/05/24 0628 08/04/24 0219 08/03/24 2204   APTT seconds 27 28 29   INR  0.9 1.0 1.0     Results from last 7 days   Lab Units 08/05/24 0628 08/04/24 0529 08/04/24 0219 08/03/24 2204 08/03/24  1637   SODIUM mmol/L 138 133* 131*   < > 126*   POTASSIUM mmol/L 4.6 4.5 4.7   < > 6.5*   CHLORIDE mmol/L 106 103 100   < > 99   CO2 mmol/L 25 21 20*   < > 20*   BUN mg/dL 18 25* 27*   < > 28*   CREATININE mg/dL 0.92 1.21* 1.31*   < > 1.56*   CALCIUM mg/dL 7.9* 7.3* 7.4*   < > 6.3*   PROTEIN TOTAL g/dL 5.2*  --  5.1*  --  4.7*  4.7*   BILIRUBIN TOTAL mg/dL 0.4  --  0.5  --  0.3  0.3   ALK PHOS U/L 77  --  75  --  86  86   ALT U/L 42  --  22  --  26  26   AST U/L 71*  --  28  --  31  31   GLUCOSE mg/dL 71* 115* 130*   < > 93    < > = values in this interval not displayed.     Results from last 7 days   Lab Units 08/05/24 0628 08/04/24 0219 08/03/24  1637   BILIRUBIN TOTAL mg/dL 0.4 0.5 0.3  0.3   BILIRUBIN DIRECT mg/dL  --  0.1 0.1     Results from last 7 days   Lab Units 08/04/24 0530 08/04/24 0256 08/04/24  0220   POCT PH, ARTERIAL pH 7.44* 7.40 7.38   POCT PCO2, ARTERIAL mm Hg 31* 32* 33*   POCT PO2, ARTERIAL mm Hg 108* 103* 111*   POCT HCO3 CALCULATED, ARTERIAL mmol/L 21.1* 19.8* 19.5*   POCT BASE EXCESS, ARTERIAL mmol/L -2.5* -4.3* -5.0*       I have reviewed all medications, laboratory results, and imaging pertinent for today's encounter.

## 2024-08-05 NOTE — LACTATION NOTE
This note was copied from a baby's chart.  Lactation Consultant Note  Lactation Consultation  Consultant Name: Drea Sellers RN IBCLC    Maternal Information  Infant to breast within first 2 hours of birth?: No  Breastfeeding Delayed Due to: Infant status    Recommendations/Summary       I spoke with mom at pt's bedside to explained availability of the [facility] LC services.  Instructed on listed patient education: SURJIT, Benefits of mother's own milk for the infant, breast massage and hand expression,CDC pump cleaning & sanitizing guidelines.  Mom was at pt's bedside holding the baby for the first time.  She was encouraged to work on getting herself on a pumping schedule.  She will need a pump for home use.  I will order her a pump through the [employer] program.  Mom will let up know what type of pump she wants through her insurance. Invited to contact LC services as needed.

## 2024-08-05 NOTE — CARE PLAN
The patient's goals for the shift include rest/visit     The clinical goals for the shift include stable vital signs and PP milestones

## 2024-08-05 NOTE — CARE PLAN
The patient's goals for the shift include rest/visit     The clinical goals for the shift include stable vital signs and PP milestones    Patient's vital signs are stable and she is experiencing appropriate postpartum course. Ahumada catheter removed and patient has voided once. She is ambulating independently and visited  in the NICU overnight. Fundus is firm and midline and bleeding is scant. She is resting comfortably at this time.

## 2024-08-05 NOTE — PROGRESS NOTES
"Postpartum Progress Note    Subjective  Pt doing well at bedside this AM. Now out of the ICU as of yesterday evening. Pain well controlled. Passing flatus but no bowel movements. Voiding spontaneously. Denies HA, RUQ pain or blurry vision. Baby in NICU doing well.     Objective  /87   Pulse 95   Temp 37.2 °C (99 °F) (Temporal)   Resp 19   Ht 1.549 m (5' 1\")   Wt 88 kg (194 lb 0.1 oz)   LMP 12/23/2023   SpO2 96%   Breastfeeding Yes   BMI 36.66 kg/m²   Gen: Alert, oriented x 3,   Head: normocephalic, atraumatic.   CV: Warm and well perfused, normal rate, regular rhythm  Lungs: Normal work of breathing on RA  Abdomen: moderately distended. Nontender to palpation. Pfannenstiel incision covered with pressure dressing  without surrounding skin changes or bleeding  : Voiding spontaneously  Skin: warm, dry  Extremities: RUE swelling improved, peripheral IV in place  Neuro: awake, responsive, no gross focal deficits appreciated    Results from last 7 days   Lab Units 08/04/24  1348 08/04/24  0647 08/04/24  0529 08/04/24 0219 08/03/24 2204 08/03/24  1637   WBC AUTO x10*3/uL 14.0* 15.3* 15.0* 15.0* 15.1* 15.2*   HEMOGLOBIN g/dL 7.8* 8.0* 8.1* 9.1* 8.5* 6.9*   HEMATOCRIT % 21.4* 22.4* 22.5* 25.4* 23.4* 20.8*   PLATELETS AUTO x10*3/uL 89* 101* 102* 99* 98* 138*   PROTIME seconds  --   --   --  10.7 11.0 11.3   INR   --   --   --  1.0 1.0 1.0   FIBRINOGEN mg/dL  --   --   --  487* 397 471*      Results from last 7 days   Lab Units 08/04/24  1348 08/04/24  0647 08/04/24  0529 08/04/24  0219 08/03/24  2204 08/03/24  1637 08/03/24  1426   WBC AUTO x10*3/uL 14.0* 15.3* 15.0* 15.0* 15.1* 15.2*  --    HEMOGLOBIN g/dL 7.8* 8.0* 8.1* 9.1* 8.5* 6.9*  --    HEMATOCRIT % 21.4* 22.4* 22.5* 25.4* 23.4* 20.8*  --    PLATELETS AUTO x10*3/uL 89* 101* 102* 99* 98* 138*  --    AST U/L  --   --   --  28  --  31  31 31   ALT U/L  --   --   --  22  --  26  26 23   CREATININE mg/dL  --   --  1.21* 1.31* 1.56* 1.56* 1.42*     "   Assessment/Plan  33yo  now POD3 s/p rCS and POD2 s/p takeback and evaluation of rectus sheath hematoma in the setting of postoperative drop in hemoglobin, ELMIRA and hyperkalemia.   Rectus Sheath Hematoma  Postoperative Care  Noted in OR on 8/3 to be 6x6cm nonexpanding left rectus sheath hematoma with 400mL hemoperitoneum evacuated  ACS consulted intraoperatively, recommended observation and serial hemoglobins with possible IR embolization at that time.  IR aware. CTA without evidence of active extravasation, deferring inferior epigastric embolization at this time  S/p 3u pRBC and 2u FFP intraoperatively. Hgb stable at 9.1 > 9.3 > 8.0 > 8.1> 8.0 >7.8  Platelets stable at 98 > 99> 102 >10>89, fibrinogen 487  Goal Hgb >7, Platelets >50k, Fibrinogen >300  Follow up AM labs, currently pending  UOP adequate, metabolic acidosis resolving  Diet: Regular, continue as tolerated    Pre-eclampsia with severe features  Current regimen: nifedipine 60mg BID, labetalol 400mg BID, restarted upon ICU transfer back to [facility]  S/p 24 hours of postpartum magnesium for seizure prophylaxis.   Labs notable for ELMIRA as below, improving    Hyperkalemia  ELMIRA  Hyperkalemia resolved most recently 4.5 s/p calcium gluconate, and lokelma  Cr stable, most recently 1.21. No evidence of bladder injury during takeback. Suspect prerenal injury secondary to blood loss and hemoperitoneum  EKG with prolonged QT in the setting of elevated K+ prior to treatment, no T wave abnormalities, pt asymptomatic this AM    Postpartum care  Breast pump at bedside, lactation consultation as needed  Baby in NICU    Discussed with Dr. Rdudy Haddad MD PGY-4  OB/GYN

## 2024-08-06 ENCOUNTER — APPOINTMENT (OUTPATIENT)
Dept: RADIOLOGY | Facility: HOSPITAL | Age: 34
End: 2024-08-06
Payer: COMMERCIAL

## 2024-08-06 LAB
ALBUMIN SERPL BCP-MCNC: 2.9 G/DL (ref 3.4–5)
ALP SERPL-CCNC: 84 U/L (ref 33–110)
ALT SERPL W P-5'-P-CCNC: 66 U/L (ref 7–45)
ANION GAP SERPL CALC-SCNC: 14 MMOL/L (ref 10–20)
AST SERPL W P-5'-P-CCNC: 81 U/L (ref 9–39)
BILIRUB SERPL-MCNC: 0.4 MG/DL (ref 0–1.2)
BUN SERPL-MCNC: 19 MG/DL (ref 6–23)
CALCIUM SERPL-MCNC: 8.3 MG/DL (ref 8.6–10.6)
CHLORIDE SERPL-SCNC: 106 MMOL/L (ref 98–107)
CO2 SERPL-SCNC: 22 MMOL/L (ref 21–32)
CREAT SERPL-MCNC: 0.76 MG/DL (ref 0.5–1.05)
EGFRCR SERPLBLD CKD-EPI 2021: >90 ML/MIN/1.73M*2
ERYTHROCYTE [DISTWIDTH] IN BLOOD BY AUTOMATED COUNT: 17.2 % (ref 11.5–14.5)
GLUCOSE SERPL-MCNC: 75 MG/DL (ref 74–99)
HCT VFR BLD AUTO: 22.6 % (ref 36–46)
HGB BLD-MCNC: 7.5 G/DL (ref 12–16)
MCH RBC QN AUTO: 29 PG (ref 26–34)
MCHC RBC AUTO-ENTMCNC: 33.2 G/DL (ref 32–36)
MCV RBC AUTO: 87 FL (ref 80–100)
NRBC BLD-RTO: 0.2 /100 WBCS (ref 0–0)
PLATELET # BLD AUTO: 128 X10*3/UL (ref 150–450)
POTASSIUM SERPL-SCNC: 4.7 MMOL/L (ref 3.5–5.3)
PROT SERPL-MCNC: 5.2 G/DL (ref 6.4–8.2)
RBC # BLD AUTO: 2.59 X10*6/UL (ref 4–5.2)
SODIUM SERPL-SCNC: 137 MMOL/L (ref 136–145)
WBC # BLD AUTO: 13.8 X10*3/UL (ref 4.4–11.3)

## 2024-08-06 PROCEDURE — 1210000001 HC SEMI-PRIVATE ROOM DAILY

## 2024-08-06 PROCEDURE — 76705 ECHO EXAM OF ABDOMEN: CPT | Performed by: STUDENT IN AN ORGANIZED HEALTH CARE EDUCATION/TRAINING PROGRAM

## 2024-08-06 PROCEDURE — 84075 ASSAY ALKALINE PHOSPHATASE: CPT | Performed by: STUDENT IN AN ORGANIZED HEALTH CARE EDUCATION/TRAINING PROGRAM

## 2024-08-06 PROCEDURE — 99231 SBSQ HOSP IP/OBS SF/LOW 25: CPT | Performed by: STUDENT IN AN ORGANIZED HEALTH CARE EDUCATION/TRAINING PROGRAM

## 2024-08-06 PROCEDURE — 2500000001 HC RX 250 WO HCPCS SELF ADMINISTERED DRUGS (ALT 637 FOR MEDICARE OP): Performed by: STUDENT IN AN ORGANIZED HEALTH CARE EDUCATION/TRAINING PROGRAM

## 2024-08-06 PROCEDURE — 2500000002 HC RX 250 W HCPCS SELF ADMINISTERED DRUGS (ALT 637 FOR MEDICARE OP, ALT 636 FOR OP/ED): Performed by: STUDENT IN AN ORGANIZED HEALTH CARE EDUCATION/TRAINING PROGRAM

## 2024-08-06 PROCEDURE — 76705 ECHO EXAM OF ABDOMEN: CPT

## 2024-08-06 PROCEDURE — 85027 COMPLETE CBC AUTOMATED: CPT | Performed by: STUDENT IN AN ORGANIZED HEALTH CARE EDUCATION/TRAINING PROGRAM

## 2024-08-06 PROCEDURE — 36415 COLL VENOUS BLD VENIPUNCTURE: CPT | Performed by: STUDENT IN AN ORGANIZED HEALTH CARE EDUCATION/TRAINING PROGRAM

## 2024-08-06 PROCEDURE — 97116 GAIT TRAINING THERAPY: CPT | Mod: GP

## 2024-08-06 RX ADMIN — ACETAMINOPHEN 975 MG: 325 TABLET ORAL at 15:13

## 2024-08-06 RX ADMIN — LABETALOL HYDROCHLORIDE 400 MG: 100 TABLET, FILM COATED ORAL at 08:29

## 2024-08-06 RX ADMIN — SIMETHICONE 80 MG: 80 TABLET, CHEWABLE ORAL at 06:44

## 2024-08-06 RX ADMIN — ACETAMINOPHEN 975 MG: 325 TABLET ORAL at 20:50

## 2024-08-06 RX ADMIN — OXYCODONE HYDROCHLORIDE 5 MG: 5 TABLET ORAL at 06:45

## 2024-08-06 RX ADMIN — NIFEDIPINE 60 MG: 60 TABLET, EXTENDED RELEASE ORAL at 08:29

## 2024-08-06 RX ADMIN — NIFEDIPINE 60 MG: 60 TABLET, EXTENDED RELEASE ORAL at 20:50

## 2024-08-06 RX ADMIN — OXYCODONE HYDROCHLORIDE 5 MG: 5 TABLET ORAL at 20:50

## 2024-08-06 RX ADMIN — LABETALOL HYDROCHLORIDE 400 MG: 100 TABLET, FILM COATED ORAL at 20:51

## 2024-08-06 RX ADMIN — ACETAMINOPHEN 975 MG: 325 TABLET ORAL at 08:29

## 2024-08-06 RX ADMIN — ACETAMINOPHEN 975 MG: 325 TABLET ORAL at 03:20

## 2024-08-06 ASSESSMENT — PAIN SCALES - GENERAL
PAINLEVEL_OUTOF10: 0 - NO PAIN
PAINLEVEL_OUTOF10: 6
PAINLEVEL_OUTOF10: 6
PAINLEVEL_OUTOF10: 7
PAINLEVEL_OUTOF10: 6
PAINLEVEL_OUTOF10: 2

## 2024-08-06 ASSESSMENT — COGNITIVE AND FUNCTIONAL STATUS - GENERAL
MOBILITY SCORE: 17
TURNING FROM BACK TO SIDE WHILE IN FLAT BAD: A LITTLE
MOVING FROM LYING ON BACK TO SITTING ON SIDE OF FLAT BED WITH BEDRAILS: A LITTLE
CLIMB 3 TO 5 STEPS WITH RAILING: A LOT
WALKING IN HOSPITAL ROOM: A LITTLE
MOVING TO AND FROM BED TO CHAIR: A LITTLE
STANDING UP FROM CHAIR USING ARMS: A LITTLE

## 2024-08-06 ASSESSMENT — PAIN DESCRIPTION - DESCRIPTORS
DESCRIPTORS: SORE
DESCRIPTORS: SORE

## 2024-08-06 NOTE — LACTATION NOTE
Lactation Consultant Note  Lactation Consultation  Reason for Consult: Initial assessment, NICU baby  Consultant Name: TRINY Lucas, RN IBCLC    Maternal Information       Maternal Assessment       Infant Assessment       Feeding Assessment       LATCH TOOL       Breast Pump       Other OB Lactation Tools       Patient Follow-up       Other OB Lactation Documentation       Recommendations/Summary    Here to talk with mother about pumping/storage of breast milk for infant in NICU. The patient is not  in her room. A letter was left.

## 2024-08-06 NOTE — PROGRESS NOTES
"Postpartum Progress Note    Subjective  Pt doing well at bedside this AM. Passing flatus but no bowel movements. Has not been ambulating much as she has been wheeled to the NICU by nursing. Encouraged more today. Voiding spontaneously. Denies HA, RUQ pain or blurry vision. Baby in NICU doing well.     Objective  /71   Pulse 91   Temp 36.6 °C (97.9 °F) (Temporal)   Resp 18   Ht 1.549 m (5' 1\")   Wt 88 kg (194 lb 0.1 oz)   LMP 12/23/2023   SpO2 98%   Breastfeeding Yes   BMI 36.66 kg/m²   Gen: Alert, oriented x 3,   Head: normocephalic, atraumatic.   CV: Warm and well perfused, normal rate, regular rhythm  Lungs: Normal work of breathing on RA  Abdomen: moderately distended. Nontender to palpation. Pfannenstiel incision covered with pressure dressing  without surrounding skin changes or bleeding  : Voiding spontaneously  Skin: warm, dry  Extremities: RUE swelling improved, peripheral IV in place  Neuro: awake, responsive, no gross focal deficits appreciated    Results from last 7 days   Lab Units 08/06/24  0641 08/05/24  1118 08/05/24 0628 08/04/24  1348 08/04/24  0529 08/04/24  0219 08/03/24  2204   WBC AUTO x10*3/uL 13.8* 16.4*  --  14.0*   < > 15.0* 15.1*   HEMOGLOBIN g/dL 7.5* 8.2*  --  7.8*   < > 9.1* 8.5*   HEMATOCRIT % 22.6* 25.0*  --  21.4*   < > 25.4* 23.4*   PLATELETS AUTO x10*3/uL 128* 117*  --  89*   < > 99* 98*   PROTIME seconds  --   --  10.5  --   --  10.7 11.0   INR   --   --  0.9  --   --  1.0 1.0   FIBRINOGEN mg/dL  --   --  615*  --   --  487* 397    < > = values in this interval not displayed.      Results from last 7 days   Lab Units 08/06/24  0641 08/05/24  1118 08/05/24 0628 08/04/24  1348 08/04/24  0647 08/04/24  0529 08/04/24  0219   WBC AUTO x10*3/uL 13.8* 16.4*  --  14.0*   < > 15.0* 15.0*   HEMOGLOBIN g/dL 7.5* 8.2*  --  7.8*   < > 8.1* 9.1*   HEMATOCRIT % 22.6* 25.0*  --  21.4*   < > 22.5* 25.4*   PLATELETS AUTO x10*3/uL 128* 117*  --  89*   < > 102* 99*   AST U/L 81*  -- "  71*  --   --   --  28   ALT U/L 66*  --  42  --   --   --  22   CREATININE mg/dL 0.76  --  0.92  --   --  1.21* 1.31*    < > = values in this interval not displayed.       Assessment/Plan  33yo  now POD4 s/p rCS and POD3 s/p takeback and evaluation of rectus sheath hematoma in the setting of postoperative drop in hemoglobin, ELMIRA and hyperkalemia.   Rectus Sheath Hematoma  Postoperative Care  Noted in OR on 8/3 to be 6x6cm nonexpanding left rectus sheath hematoma with 400mL hemoperitoneum evacuated  ACS consulted intraoperatively, recommended observation and serial hemoglobins with possible IR embolization at that time.  IR aware. CTA without evidence of active extravasation, deferring inferior epigastric embolization at this time  S/p 3u pRBC and 2u FFP intraoperatively. Hgb stable at 9.1 > 9.3 > 8.0 > 8.1> 8.0 >7.8> 8.2 > 7.5  Platelets stable at 98 > 99> 102 >10>89, > 117> 128, fibrinogen 487  Goal Hgb >7, Platelets >50k, Fibrinogen >300  Follow up AM labs, currently pending  UOP adequate, metabolic acidosis resolving  Diet: Regular, continue as tolerated    Pre-eclampsia with severe features  Current regimen: nifedipine 60mg BID, labetalol 400mg BID, restarted upon ICU transfer back to Mac 4  S/p 24 hours of postpartum magnesium for seizure prophylaxis.   Labs notable for ELMIRA as below, improving    Hyperkalemia  ELMIRA  Hyperkalemia resolved most recently 4.5 s/p calcium gluconate, and lokelma  Cr stable, most recently 1.21. No evidence of bladder injury during takeback. Suspect prerenal injury secondary to blood loss and hemoperitoneum  EKG with prolonged QT in the setting of elevated K+ prior to treatment, no T wave abnormalities, pt asymptomatic this AM    Transaminitis  LFT's previously wnl, now elevated on : AST/ALT:  28/22 > 71/42 > 81/66  No RUQ reported on exam this AM, will order RUQ US, follow up restuls  Continue to monitor, if US wnl, will get AM labs.    Postpartum care  Breast pump at  bedside, lactation consultation as needed  Baby in NICU    Discussed with Dr. Hannah Haddad MD PGY-4  OB/GYN

## 2024-08-06 NOTE — PROGRESS NOTES
Physical Therapy    Physical Therapy Treatment    Patient Name: Jacqueline Ambriz  MRN: [id number]  Today's Date: 8/6/2024  Time Calculation  Start Time: 0808  Stop Time: 0821  Time Calculation (min): 13 min    Assessment/Plan   PT Assessment  End of Session Communication: Bedside nurse  Assessment Comment: Pt currently requiring Jez-CGA for bed mobility, transfers and gait. Would benefit from low intensity therapy when medically stable for DC. Will continue to follow while in house.  End of Session Patient Position: Bed, 2 rail up, Alarm off, not on at start of session     PT Plan  Treatment/Interventions: Bed mobility, Transfer training, Gait training, Stair training, Balance training, Neuromuscular re-education, Strengthening, Endurance training, Range of motion, Therapeutic exercise, Therapeutic activity, Home exercise program, Positioning, Postural re-education  PT Plan: Ongoing PT  PT Frequency: 3 times per week  PT Discharge Recommendations: Low intensity level of continued care  Equipment Recommended upon Discharge: Wheeled walker  PT Recommended Transfer Status: Assist x1  PT - OK to Discharge: Yes (when medically stable for DC)      General Visit Information:   PT  Visit  PT Received On: 08/06/24  Response to Previous Treatment: Patient with no complaints from previous session.  General  Reason for Referral: C section on 8/2 c/b rectus sheath hematoma s/p takeback and evaluation on 8/3  Family/Caregiver Present: Yes  Caregiver Feedback: significant other present, supportive  Prior to Session Communication: Bedside nurse  Patient Position Received: Bed, 2 rail up, Alarm off, not on at start of session  General Comment: Pt sitting EOB upon PT arrival, endorses difficulty ambulating 2/2 pain    Subjective   Precautions:  Precautions  Medical Precautions: Fall precautions  Post-Surgical Precautions: Abdominal surgery precautions       Objective   Pain:  Pain Assessment  0-10 (Numeric) Pain Score: 6  Pain Type:  Acute pain, Surgical pain  Pain Location: Abdomen  Pain Interventions: Repositioned  Cognition:  Cognition  Overall Cognitive Status: Within Functional Limits  Coordination:  Movements are Fluid and Coordinated: Yes  Postural Control:  Postural Control  Postural Control: Within Functional Limits        Activity Tolerance:  Activity Tolerance  Endurance: Tolerates 10 - 20 min exercise with multiple rests  Treatments:  Bed Mobility 1  Bed Mobility 1: Sitting to supine  Level of Assistance 1: Minimum assistance  Bed Mobility Comments 1: assist for LEs; cueing for logrolling; HOB elevated    Ambulation/Gait Training  Ambulation/Gait Training Performed: Yes  Ambulation/Gait Training 1  Surface 1: Level tile  Device 1: Rolling walker  Assistance 1: Contact guard, Minimal verbal cues  Quality of Gait 1: Wide base of support, Decreased step length, Forward flexed posture  Comments/Distance (ft) 1: 50'  Transfers  Transfer: Yes  Transfer 1  Technique 1: Sit to stand, Stand to sit  Transfer Device 1: Walker  Transfer Level of Assistance 1: Contact guard    Stairs  Stairs: No    Outcome Measures:  [facility] Basic Mobility  Turning from your back to your side while in a flat bed without using bedrails: A little  Moving from lying on your back to sitting on the side of a flat bed without using bedrails: A little  Moving to and from bed to chair (including a wheelchair): A little  Standing up from a chair using your arms (e.g. wheelchair or bedside chair): A little  To walk in hospital room: A little  Climbing 3-5 steps with railing: A lot  Basic Mobility - Total Score: 17    Education Documentation  Precautions, taught by Rossy Monk PT at 8/6/2024 12:24 PM.  Learner: Significant Other, Patient  Readiness: Acceptance  Method: Explanation  Response: Verbalizes Understanding    Body Mechanics, taught by Rossy Monk PT at 8/6/2024 12:24 PM.  Learner: Significant Other, Patient  Readiness: Acceptance  Method:  Explanation  Response: Verbalizes Understanding    Mobility Training, taught by Rossy Monk, PT at 8/6/2024 12:24 PM.  Learner: Significant Other, Patient  Readiness: Acceptance  Method: Explanation  Response: Verbalizes Understanding    Education Comments  No comments found.        OP EDUCATION:       Encounter Problems       Encounter Problems (Active)       PT Problem       Pt will be IND with bed mobility  (Progressing)       Start:  08/04/24    Expected End:  08/18/24            Pt will be IND with sit<>stand  (Progressing)       Start:  08/04/24    Expected End:  08/18/24            Pt will ambulate >/= 150ft with LRAD and SBA (Progressing)       Start:  08/04/24    Expected End:  08/18/24            Pt will stand x1 minute without UE support and SBA  (Progressing)       Start:  08/04/24    Expected End:  08/18/24            Pt will ascend/descend >/= 1 curb step without UE support with SBA  (Progressing)       Start:  08/04/24    Expected End:  08/18/24 08/06/24 at 12:25 PM - Rossy Monk, PT

## 2024-08-07 LAB
ALBUMIN SERPL BCP-MCNC: 2.9 G/DL (ref 3.4–5)
ALP SERPL-CCNC: 83 U/L (ref 33–110)
ALT SERPL W P-5'-P-CCNC: 55 U/L (ref 7–45)
ANION GAP SERPL CALC-SCNC: 16 MMOL/L (ref 10–20)
AST SERPL W P-5'-P-CCNC: 46 U/L (ref 9–39)
BILIRUB SERPL-MCNC: 0.5 MG/DL (ref 0–1.2)
BUN SERPL-MCNC: 15 MG/DL (ref 6–23)
CALCIUM SERPL-MCNC: 8.4 MG/DL (ref 8.6–10.6)
CHLORIDE SERPL-SCNC: 105 MMOL/L (ref 98–107)
CO2 SERPL-SCNC: 20 MMOL/L (ref 21–32)
CREAT SERPL-MCNC: 0.69 MG/DL (ref 0.5–1.05)
EGFRCR SERPLBLD CKD-EPI 2021: >90 ML/MIN/1.73M*2
ERYTHROCYTE [DISTWIDTH] IN BLOOD BY AUTOMATED COUNT: 16.7 % (ref 11.5–14.5)
GLUCOSE SERPL-MCNC: 79 MG/DL (ref 74–99)
HCT VFR BLD AUTO: 21.1 % (ref 36–46)
HGB BLD-MCNC: 7.2 G/DL (ref 12–16)
LABORATORY COMMENT REPORT: NORMAL
MCH RBC QN AUTO: 28 PG (ref 26–34)
MCHC RBC AUTO-ENTMCNC: 34.1 G/DL (ref 32–36)
MCV RBC AUTO: 82 FL (ref 80–100)
NRBC BLD-RTO: 0 /100 WBCS (ref 0–0)
PATH REPORT.FINAL DX SPEC: NORMAL
PATH REPORT.GROSS SPEC: NORMAL
PATH REPORT.RELEVANT HX SPEC: NORMAL
PATH REPORT.TOTAL CANCER: NORMAL
PLATELET # BLD AUTO: 164 X10*3/UL (ref 150–450)
POTASSIUM SERPL-SCNC: 4.5 MMOL/L (ref 3.5–5.3)
PROT SERPL-MCNC: 5.7 G/DL (ref 6.4–8.2)
RBC # BLD AUTO: 2.57 X10*6/UL (ref 4–5.2)
SODIUM SERPL-SCNC: 136 MMOL/L (ref 136–145)
WBC # BLD AUTO: 9.7 X10*3/UL (ref 4.4–11.3)

## 2024-08-07 PROCEDURE — 1210000001 HC SEMI-PRIVATE ROOM DAILY

## 2024-08-07 PROCEDURE — 80053 COMPREHEN METABOLIC PANEL: CPT | Performed by: STUDENT IN AN ORGANIZED HEALTH CARE EDUCATION/TRAINING PROGRAM

## 2024-08-07 PROCEDURE — 85027 COMPLETE CBC AUTOMATED: CPT | Performed by: STUDENT IN AN ORGANIZED HEALTH CARE EDUCATION/TRAINING PROGRAM

## 2024-08-07 PROCEDURE — 99232 SBSQ HOSP IP/OBS MODERATE 35: CPT | Performed by: STUDENT IN AN ORGANIZED HEALTH CARE EDUCATION/TRAINING PROGRAM

## 2024-08-07 PROCEDURE — 2500000001 HC RX 250 WO HCPCS SELF ADMINISTERED DRUGS (ALT 637 FOR MEDICARE OP): Performed by: STUDENT IN AN ORGANIZED HEALTH CARE EDUCATION/TRAINING PROGRAM

## 2024-08-07 PROCEDURE — 36415 COLL VENOUS BLD VENIPUNCTURE: CPT | Performed by: STUDENT IN AN ORGANIZED HEALTH CARE EDUCATION/TRAINING PROGRAM

## 2024-08-07 PROCEDURE — 2500000002 HC RX 250 W HCPCS SELF ADMINISTERED DRUGS (ALT 637 FOR MEDICARE OP, ALT 636 FOR OP/ED): Performed by: STUDENT IN AN ORGANIZED HEALTH CARE EDUCATION/TRAINING PROGRAM

## 2024-08-07 RX ORDER — NIFEDIPINE 60 MG/1
60 TABLET, FILM COATED, EXTENDED RELEASE ORAL EVERY 12 HOURS
Status: DISCONTINUED | OUTPATIENT
Start: 2024-08-07 | End: 2024-08-08 | Stop reason: HOSPADM

## 2024-08-07 RX ADMIN — ACETAMINOPHEN 975 MG: 325 TABLET ORAL at 04:39

## 2024-08-07 RX ADMIN — ACETAMINOPHEN 975 MG: 325 TABLET ORAL at 11:12

## 2024-08-07 RX ADMIN — ACETAMINOPHEN 975 MG: 325 TABLET ORAL at 17:32

## 2024-08-07 RX ADMIN — ACETAMINOPHEN 975 MG: 325 TABLET ORAL at 22:54

## 2024-08-07 RX ADMIN — OXYCODONE HYDROCHLORIDE 10 MG: 5 TABLET ORAL at 22:57

## 2024-08-07 RX ADMIN — NIFEDIPINE 60 MG: 60 TABLET, EXTENDED RELEASE ORAL at 22:54

## 2024-08-07 ASSESSMENT — PAIN SCALES - GENERAL
PAINLEVEL_OUTOF10: 0 - NO PAIN
PAINLEVEL_OUTOF10: 9
PAINLEVEL_OUTOF10: 0 - NO PAIN
PAINLEVEL_OUTOF10: 4
PAINLEVEL_OUTOF10: 0 - NO PAIN

## 2024-08-07 NOTE — PROGRESS NOTES
"Postpartum Progress Note    Subjective  Feeling well overall. Passing flatus but no bowel movements. Got up and walked more yesterday with helped with her gas. Voiding spontaneously. Tolerating diet without n/v. Denies HA, RUQ pain, chest pain, SOB, or blurry vision. Pain well controlled.Baby in NICU doing well, IV removed from baby, Shayna.     Objective  /76   Pulse 95   Temp 36.2 °C (97.2 °F) (Temporal)   Resp 18   Ht 1.549 m (5' 1\")   Wt 88 kg (194 lb 0.1 oz)   LMP 12/23/2023   SpO2 99%   Breastfeeding Yes   BMI 36.66 kg/m²   Gen: Alert, oriented x 3,   Head: normocephalic, atraumatic.   CV: Warm and well perfused, normal rate  Lungs: Normal work of breathing on RA  Abdomen: moderately distended. Nontender to palpation. Tympanic to percussion. No rebound/guarding Pfannenstiel incision c/d/i  : Voiding spontaneously  Skin: warm, dry  Extremities: RUE swelling improved, peripheral IV in place  Neuro: awake, responsive    Results from last 7 days   Lab Units 08/06/24  0641 08/05/24  1118 08/05/24 0628 08/04/24  1348 08/04/24  0529 08/04/24  0219 08/03/24  2204   WBC AUTO x10*3/uL 13.8* 16.4*  --  14.0*   < > 15.0* 15.1*   HEMOGLOBIN g/dL 7.5* 8.2*  --  7.8*   < > 9.1* 8.5*   HEMATOCRIT % 22.6* 25.0*  --  21.4*   < > 25.4* 23.4*   PLATELETS AUTO x10*3/uL 128* 117*  --  89*   < > 99* 98*   PROTIME seconds  --   --  10.5  --   --  10.7 11.0   INR   --   --  0.9  --   --  1.0 1.0   FIBRINOGEN mg/dL  --   --  615*  --   --  487* 397    < > = values in this interval not displayed.      Results from last 7 days   Lab Units 08/06/24  0641 08/05/24  1118 08/05/24  0628 08/04/24  1348 08/04/24  0647 08/04/24  0529 08/04/24  0219   WBC AUTO x10*3/uL 13.8* 16.4*  --  14.0*   < > 15.0* 15.0*   HEMOGLOBIN g/dL 7.5* 8.2*  --  7.8*   < > 8.1* 9.1*   HEMATOCRIT % 22.6* 25.0*  --  21.4*   < > 22.5* 25.4*   PLATELETS AUTO x10*3/uL 128* 117*  --  89*   < > 102* 99*   AST U/L 81*  --  71*  --   --   --  28   ALT U/L 66* "  --  42  --   --   --  22   CREATININE mg/dL 0.76  --  0.92  --   --  1.21* 1.31*    < > = values in this interval not displayed.       Assessment/Plan  33yo  now POD5 s/p rCS and POD4 s/p takeback and evaluation of rectus sheath hematoma in the setting of postoperative drop in hemoglobin. Overall recovering appropriately and meeting postoperative milestones.    Rectus Sheath Hematoma  Postoperative Care  Noted in OR on 8/3 to be 6x6cm nonexpanding left rectus sheath hematoma with 400mL hemoperitoneum evacuated  ACS consulted intraoperatively, recommended observation and serial hemoglobins with possible IR embolization at that time. Given that CTA without active bleeding and patient stable, deferring embolization  S/p 3u pRBC and 2u FFP intraoperatively. Hgb stable in 7-8s  Goal Hgb >7, Platelets >50k, Fibrinogen >300  Follow up AM labs, currently pending  Diet: Regular, continue as tolerated  Continue working with PT on ambulation, currently recommended home PT. Will coordinate    Pre-eclampsia with severe features  Current regimen: nifedipine 60mg BID, labetalol 400mg BID, currently normotensive  S/p 24 hours of postpartum magnesium for seizure prophylaxis  HELLP labs notable for ELMIRA (resolved) and elevated LFTs (new postpartum)  Follow up AM labs  Asymptomatic    Transaminitis  LFT's previously wnl, now elevated on : AST/ALT:  28/22 > 71/42 > 81/66  RUQ US notable for biliary sludge and small amount of perihepatic fluid, otherwise unremarkable  Differential includes sPEC, medication side effect, hepatitis, among others  Follow up AM labs    Postpartum care  Breast pump at bedside, lactation consultation as needed  Baby in NICU, doing well, emotional support provided    Discussed with Dr. David Bolton MD  PGY-4, Obstetrics and Gynecology

## 2024-08-07 NOTE — LACTATION NOTE
Lactation Consultant Note  Lactation Consultation  Reason for Consult: Follow-up assessment, NICU baby  Consultant Name: Delma Lofton RN IBCLC    Maternal Information  Has mother  before?: No  Exclusive Pump and Bottle Feed: Yes (infant 31.6 weeks in NICU)    Maternal Assessment  Breast Assessment: Large, Symmetrical, Soft, Compressible  Nipple Assessment: Intact, Short  Areola Assessment: Normal    Infant Assessment       Feeding Assessment  Nutrition Source:  (per NICU)    LATCH TOOL       Breast Pump  Pump: Hospital grade electric pump, Double breast pumping  Frequency: Less than 3 times per day (encoaurged to increase pumping sessions to every 2-3 hours)  Duration: Initiate phase  Breast Shield Size and Type: 21 mm  Units of Volume: mL per session    Other OB Lactation Tools       Patient Follow-up       Other OB Lactation Documentation  Maternal Risk Factors: Preeclampsia, Hypertension,  delivery <37 weeks,  delivery  Infant Risk Factors: Prematurity <37 weeks    Recommendations/Summary  Mother shared that she had just started pumping this afternoon and has pumped twice thus far today.. She has obtained some small amounts of colostrum with these pumping sessions. Encouraged mother to increase the frequency of her pumping sessions to every three hours to provide adequate stimulation to her breasts in order to build a full milk supply. Mother was using the 24mm flanges and I instructed her to decrease the size to a 21mm flange. With measuring her nipples her right nipple was at 17mm and her left at 19mm. Discussed ordering a 18-19mm insert for her right breast flange. Encouraged kangaroo care with infant visits if permitted.  Discussed with mother the availability of the auxiliary ramsey pump. At this time mother plans to board and stay with infant upon her discharge. Assisted with ordering mother a breast pump for home from [employer]. Mother denied having any questions nor concerns  at this time.

## 2024-08-08 ENCOUNTER — PHARMACY VISIT (OUTPATIENT)
Dept: PHARMACY | Facility: CLINIC | Age: 34
End: 2024-08-08
Payer: MEDICAID

## 2024-08-08 ENCOUNTER — DOCUMENTATION (OUTPATIENT)
Dept: HOME HEALTH SERVICES | Facility: HOME HEALTH | Age: 34
End: 2024-08-08
Payer: COMMERCIAL

## 2024-08-08 ENCOUNTER — HOME HEALTH ADMISSION (OUTPATIENT)
Dept: HOME HEALTH SERVICES | Facility: HOME HEALTH | Age: 34
End: 2024-08-08
Payer: COMMERCIAL

## 2024-08-08 VITALS
WEIGHT: 194 LBS | SYSTOLIC BLOOD PRESSURE: 122 MMHG | HEIGHT: 61 IN | RESPIRATION RATE: 17 BRPM | HEART RATE: 102 BPM | OXYGEN SATURATION: 98 % | DIASTOLIC BLOOD PRESSURE: 75 MMHG | TEMPERATURE: 97.7 F | BODY MASS INDEX: 36.63 KG/M2

## 2024-08-08 LAB
ALBUMIN SERPL BCP-MCNC: 3.3 G/DL (ref 3.4–5)
ALP SERPL-CCNC: 104 U/L (ref 33–110)
ALT SERPL W P-5'-P-CCNC: 42 U/L (ref 7–45)
ANION GAP SERPL CALC-SCNC: 15 MMOL/L (ref 10–20)
AST SERPL W P-5'-P-CCNC: 31 U/L (ref 9–39)
BILIRUB SERPL-MCNC: 0.7 MG/DL (ref 0–1.2)
BUN SERPL-MCNC: 13 MG/DL (ref 6–23)
CALCIUM SERPL-MCNC: 8.7 MG/DL (ref 8.6–10.6)
CHLORIDE SERPL-SCNC: 104 MMOL/L (ref 98–107)
CO2 SERPL-SCNC: 20 MMOL/L (ref 21–32)
CREAT SERPL-MCNC: 0.78 MG/DL (ref 0.5–1.05)
EGFRCR SERPLBLD CKD-EPI 2021: >90 ML/MIN/1.73M*2
ERYTHROCYTE [DISTWIDTH] IN BLOOD BY AUTOMATED COUNT: 17 % (ref 11.5–14.5)
GLUCOSE SERPL-MCNC: 87 MG/DL (ref 74–99)
HCT VFR BLD AUTO: 23.3 % (ref 36–46)
HGB BLD-MCNC: 8 G/DL (ref 12–16)
MCH RBC QN AUTO: 28.2 PG (ref 26–34)
MCHC RBC AUTO-ENTMCNC: 34.3 G/DL (ref 32–36)
MCV RBC AUTO: 82 FL (ref 80–100)
NRBC BLD-RTO: 0.2 /100 WBCS (ref 0–0)
PLATELET # BLD AUTO: 188 X10*3/UL (ref 150–450)
POTASSIUM SERPL-SCNC: 4.4 MMOL/L (ref 3.5–5.3)
PROT SERPL-MCNC: 6.5 G/DL (ref 6.4–8.2)
RBC # BLD AUTO: 2.84 X10*6/UL (ref 4–5.2)
SODIUM SERPL-SCNC: 135 MMOL/L (ref 136–145)
WBC # BLD AUTO: 13.6 X10*3/UL (ref 4.4–11.3)

## 2024-08-08 PROCEDURE — 36415 COLL VENOUS BLD VENIPUNCTURE: CPT | Performed by: STUDENT IN AN ORGANIZED HEALTH CARE EDUCATION/TRAINING PROGRAM

## 2024-08-08 PROCEDURE — 99239 HOSP IP/OBS DSCHRG MGMT >30: CPT | Performed by: STUDENT IN AN ORGANIZED HEALTH CARE EDUCATION/TRAINING PROGRAM

## 2024-08-08 PROCEDURE — 80053 COMPREHEN METABOLIC PANEL: CPT | Performed by: STUDENT IN AN ORGANIZED HEALTH CARE EDUCATION/TRAINING PROGRAM

## 2024-08-08 PROCEDURE — 85027 COMPLETE CBC AUTOMATED: CPT | Performed by: STUDENT IN AN ORGANIZED HEALTH CARE EDUCATION/TRAINING PROGRAM

## 2024-08-08 PROCEDURE — 2500000001 HC RX 250 WO HCPCS SELF ADMINISTERED DRUGS (ALT 637 FOR MEDICARE OP): Performed by: STUDENT IN AN ORGANIZED HEALTH CARE EDUCATION/TRAINING PROGRAM

## 2024-08-08 PROCEDURE — RXMED WILLOW AMBULATORY MEDICATION CHARGE

## 2024-08-08 PROCEDURE — 2500000002 HC RX 250 W HCPCS SELF ADMINISTERED DRUGS (ALT 637 FOR MEDICARE OP, ALT 636 FOR OP/ED): Performed by: STUDENT IN AN ORGANIZED HEALTH CARE EDUCATION/TRAINING PROGRAM

## 2024-08-08 RX ORDER — LABETALOL 100 MG/1
400 TABLET, FILM COATED ORAL 2 TIMES DAILY
Qty: 240 TABLET | Refills: 0 | Status: SHIPPED | OUTPATIENT
Start: 2024-08-08 | End: 2024-09-07

## 2024-08-08 RX ORDER — NIFEDIPINE 60 MG/1
60 TABLET, FILM COATED, EXTENDED RELEASE ORAL EVERY 12 HOURS
Qty: 60 TABLET | Refills: 0 | Status: SHIPPED | OUTPATIENT
Start: 2024-08-08 | End: 2024-09-07

## 2024-08-08 RX ORDER — ACETAMINOPHEN 325 MG/1
975 TABLET ORAL EVERY 6 HOURS
Status: DISCONTINUED | OUTPATIENT
Start: 2024-08-08 | End: 2024-08-08

## 2024-08-08 RX ORDER — IBUPROFEN 600 MG/1
600 TABLET ORAL EVERY 6 HOURS
Qty: 120 TABLET | Refills: 0 | Status: SHIPPED | OUTPATIENT
Start: 2024-08-08 | End: 2024-09-07

## 2024-08-08 RX ADMIN — ACETAMINOPHEN 975 MG: 325 TABLET ORAL at 06:02

## 2024-08-08 RX ADMIN — OXYCODONE HYDROCHLORIDE 10 MG: 5 TABLET ORAL at 12:31

## 2024-08-08 RX ADMIN — NIFEDIPINE 60 MG: 60 TABLET, EXTENDED RELEASE ORAL at 10:00

## 2024-08-08 RX ADMIN — OXYCODONE HYDROCHLORIDE 10 MG: 5 TABLET ORAL at 06:02

## 2024-08-08 RX ADMIN — ACETAMINOPHEN 975 MG: 325 TABLET ORAL at 12:31

## 2024-08-08 ASSESSMENT — PAIN SCALES - GENERAL
PAINLEVEL_OUTOF10: 9
PAINLEVEL_OUTOF10: 3
PAINLEVEL_OUTOF10: 4
PAINLEVEL_OUTOF10: 8
PAINLEVEL_OUTOF10: 3

## 2024-08-08 ASSESSMENT — PAIN DESCRIPTION - LOCATION: LOCATION: ABDOMEN

## 2024-08-08 ASSESSMENT — PAIN DESCRIPTION - DESCRIPTORS
DESCRIPTORS: SORE

## 2024-08-08 NOTE — HH CARE COORDINATION
Home Care received a Referral for Physical Therapy. We have processed the referral for a Start of Care on 24-48 HOURS .     If you have any questions or concerns, please feel free to contact us at [phone]. Follow the prompts, enter your five digit zip code, and you will be directed to your care team on [facility] 1.

## 2024-08-08 NOTE — DISCHARGE SUMMARY
Discharge Summary    Admission Date: 2024  Discharge Date:     Discharge Diagnosis  Indication for care in labor and delivery, antepartum ([facility])    Hospital Course  Delivery Date: 2024 11:29 PM  Delivery type: , Low Transverse   GA at delivery: 31w6d  Outcome: Living  Anesthesia during delivery: Epidural  Intrapartum complications: Failure to Progress in First Stage;Intraamniotic Infection  Feeding method: Breastfeeding Status: Yes     Procedures:  Take Back for re-exploration and evacuation of hemoperitoneum in the s/o rectus sheath hematoma  Contraception at discharge: Nexplanon    Hospital Course  Pt admitted on  at 31.2wga by 7wk US with newly diagnosed sPEC based on severe range BP requiring IV tx, recevied Hydralazine 5/10 and started on PO nifedipine for long acting BP coverage. HELLP labs wnl on admission. Admitted to antepartum service for inpatient management until 34wga initially, however pt w/ confirmed PPROM on HD #3 and started on latency abx at that time. By HD#5 at 31.6wga , pt with worsening abdominal pain and fundal tenderness c/f IAI and uptrending Cr and LFT's c/f worsening sPEC. MFM team recommended delivery at that time and patient transferred to Mac 2 for TOLAC IOL. Pt subsequently underwent a rCS on  for failed IOL after 12 hours of pitocin augmentation. Cesearan section uncomplicated w/ a QBL of 768--See operative report for more details. In recovery, patient with SOB and O2 requirement, found to have pulmonary edema on CXR and given a short course of lasix.    On POD#1 s/p rCS, pt with somnolence and concern for possible postpartum magnesium toxicity. Repeat labs notable for supratherapeutic magnesium level at 8.9 with worsening anemia to 6.9, hyperkalemia >7, ELMIRA and increased abdominal distension concerning for ongoing bleeding. EKG notable for QT prolongation at that time. Taken to the OR for re-exploration and found to have a 500cc hemoperitoneum with  6x6cm rectus sheath hematoma. IR and General surgery teams consulted intraoperatively. CTA negative for active extravasation from pelvic vasculature and embolization deferred. Pt received 3u pRBC intraoperatively and. Pt transferred to TSICU for close monitoring. By POD#1 patient doing well with stable labs and extubated on RA. Remaining postoperative/hospital course notable for a slight increase in LFTs on POD#4 after her takeback. RUQ notable for biliary sludge but no acute changes, likely 2/2 sPEC, pt remained asymptomatic. By DOD LFT's downtrending and patient passing flatus, tolerating PO with out N/V and ambulating with assistance of a walker. BP stable in the s/o sPEC, pt discharged home on Nifediping 60mg BID and Labetalol 400mg BID. PT consulted post op and pt skilled for home PT, established prior to discharge. Discharged home to board with baby in the NICU in stable condition with plan for BP FUV in 4-6 days following discharge and routine PPV in 4-6 weeks.             Pertinent Physical Exam At Time of Discharge  Physical Exam  Constitutional:       Appearance: Normal appearance.   HENT:      Head: Normocephalic and atraumatic.      Nose: Nose normal.      Mouth/Throat:      Mouth: Mucous membranes are moist.      Pharynx: No oropharyngeal exudate or posterior oropharyngeal erythema.   Eyes:      Extraocular Movements: Extraocular movements intact.      Conjunctiva/sclera: Conjunctivae normal.   Pulmonary:      Effort: Pulmonary effort is normal.   Musculoskeletal:      Cervical back: Normal range of motion.   Skin:     Findings: No bruising, erythema, lesion or rash.   Neurological:      General: No focal deficit present.      Mental Status: She is alert.   Psychiatric:         Mood and Affect: Mood normal.         Behavior: Behavior normal.         Thought Content: Thought content normal.         Judgment: Judgment normal.          Last Vitals:  Temp Pulse Resp BP MAP Pulse Ox   36.5 °C (97.7 °F) 102  17 122/75 91 98 %     Discharge Meds     Your medication list        START taking these medications        Instructions Last Dose Given Next Dose Due   blood pressure test kit-large kit      1 applicator once daily.       ibuprofen 600 mg tablet      Take 1 tablet (600 mg) by mouth every 6 hours.       labetalol 100 mg tablet  Commonly known as: Normodyne      Take 4 tablets (400 mg) by mouth 2 times a day.       NIFEdipine ER 60 mg 24 hr tablet  Commonly known as: Adalat CC      Take 1 tablet (60 mg) by mouth every 12 hours. Do not crush, chew, or split.              CONTINUE taking these medications        Instructions Last Dose Given Next Dose Due   acetaminophen 500 mg tablet  Commonly known as: Tylenol           aspirin 81 mg chewable tablet      Chew 1 tablet (81 mg) once daily.       ferrous gluconate 324 (38 Fe) mg tablet  Commonly known as: Fergon      Take 1 tablet (38 mg of iron) by mouth once daily with breakfast.              STOP taking these medications      metroNIDAZOLE 500 mg tablet  Commonly known as: Flagyl        prenatal vitamin (iron-folic) 27 mg iron-800 mcg folic acid tablet                  Where to Get Your Medications        These medications were sent to [facility]/pharmacy #[id number] [address] AT [address] [address]      Phone: [phone]   blood pressure test kit-large kit       These medications were sent to [facility]  [address] [place] [id number]      Hours: 8:30 AM to 5 PM Mon-Fri Phone: [phone]   ibuprofen 600 mg tablet  labetalol 100 mg tablet  NIFEdipine ER 60 mg 24 hr tablet          Complications Requiring Follow-Up  Severe Preeclampsia    Test Results Pending At Discharge  Pending Labs       Order Current Status    POCT UA (nonautomated) manually resulted In process            Outpatient Follow-Up  Future Appointments   Date Time Provider Department Center   8/9/2024  3:00 PM  Linwood Quezada, PT [facility]           Hebert Haddad MD

## 2024-08-08 NOTE — SIGNIFICANT EVENT
Spoke with patient to verify address and phone number. Home care provider list given. Awaiting SOC date from  home care.     08/08/2024 at 1600:  Home care  accepting patient with SOC tomorrow, per Ally Ram RN. Patient, bedside RN and doctor aware.  home care phone number given to patient.

## 2024-08-08 NOTE — PROGRESS NOTES
"Postpartum Progress Note    Subjective  Feeling well overall. Passing flatus but no bowel movements. Got up and walked more yesterday with helped with her gas. Voiding spontaneously. Tolerating diet without n/v. Denies HA, RUQ pain, chest pain, SOB, or blurry vision. Pain well controlled.Baby in NICU doing well    Objective  /72   Pulse 105   Temp 37.5 °C (99.5 °F) (Temporal)   Resp 18   Ht 1.549 m (5' 1\")   Wt 88 kg (194 lb 0.1 oz)   LMP 2023   SpO2 96%   Breastfeeding Yes   BMI 36.66 kg/m²   Gen: Alert, oriented x 3,   Head: normocephalic, atraumatic.   CV: Warm and well perfused, normal rate  Lungs: Normal work of breathing on RA  Abdomen: moderately distended. Nontender to palpation. Tympanic to percussion. No rebound/guarding Pfannenstiel incision c/d/i  : Voiding spontaneously  Skin: warm, dry  Extremities: RUE swelling improved, peripheral IV in place  Neuro: awake, responsive    Results from last 7 days   Lab Units 24  0652 24  0641 24  1118 24  0628 24  0529 24  0219 24  2204   WBC AUTO x10*3/uL 9.7 13.8* 16.4*  --    < > 15.0* 15.1*   HEMOGLOBIN g/dL 7.2* 7.5* 8.2*  --    < > 9.1* 8.5*   HEMATOCRIT % 21.1* 22.6* 25.0*  --    < > 25.4* 23.4*   PLATELETS AUTO x10*3/uL 164 128* 117*  --    < > 99* 98*   PROTIME seconds  --   --   --  10.5  --  10.7 11.0   INR   --   --   --  0.9  --  1.0 1.0   FIBRINOGEN mg/dL  --   --   --  615*  --  487* 397    < > = values in this interval not displayed.      Results from last 7 days   Lab Units 24  0652 24  0641 24  1118 24  06   WBC AUTO x10*3/uL 9.7 13.8* 16.4*  --    HEMOGLOBIN g/dL 7.2* 7.5* 8.2*  --    HEMATOCRIT % 21.1* 22.6* 25.0*  --    PLATELETS AUTO x10*3/uL 164 128* 117*  --    AST U/L 46* 81*  --  71*   ALT U/L 55* 66*  --  42   CREATININE mg/dL 0.69 0.76  --  0.92       Assessment/Plan  33yo  now POD6 s/p rCS and POD5 s/p takeback and evaluation of rectus sheath " hematoma in the setting of postoperative drop in hemoglobin. Overall recovering appropriately and meeting postoperative milestones.    Rectus Sheath Hematoma  Postoperative Care  Noted in OR on 8/3 to be 6x6cm nonexpanding left rectus sheath hematoma with 400mL hemoperitoneum evacuated  ACS consulted intraoperatively, recommended observation and serial hemoglobins with possible IR embolization at that time. Given that CTA without active bleeding and patient stable, deferring embolization  S/p 3u pRBC and 2u FFP intraoperatively. Hgb 8, improving   Goal Hgb >7, Platelets >50k, Fibrinogen >300  Follow up AM labs, currently pending  Diet: Regular, continue as tolerated  Continue working with PT on ambulation, currently recommended home PT. Will coordinate. Home Care orders placed today.     Pre-eclampsia with severe features  Current regimen: nifedipine 60mg BID, labetalol 400mg BID, currently normotensive  S/p 24 hours of postpartum magnesium for seizure prophylaxis  HELLP labs notable for ELMIRA (resolved) and elevated LFTs (new postpartum)  Asymptomatic    Transaminitis  LFT's previously wnl, now elevated on 8/5: AST/ALT:  28/22 > 71/42 > 81/66 > normalized  RUQ US notable for biliary sludge and small amount of perihepatic fluid, otherwise unremarkable  Differential includes sPEC, medication side effect, hepatitis, among others    Postpartum care  Breast pump at bedside, lactation consultation as needed  Baby in NICU, doing well, emotional support provided    Discussed with Dr. Octavio Haddad MD PGY-4

## 2024-08-08 NOTE — LACTATION NOTE
Lactation Consultant Note  Lactation Consultation  Reason for Consult: Follow-up assessment, NICU baby  Consultant Name: TRINY Lucas RN IBCLC    Maternal Information       Maternal Assessment       Infant Assessment       Feeding Assessment       LATCH TOOL       Breast Pump       Other OB Lactation Tools       Patient Follow-up       Other OB Lactation Documentation       Recommendations/Summary    Attempted to see this mother whose infant is in the NICU. The patient was not in her room. A letter was left.

## 2024-08-08 NOTE — CARE PLAN
The patient's goals for the shift include rest/visit     The clinical goals for the shift include BP <160/110    Patient discharged with VSS, pain well controlled with scheduled tylenol and motrin and PRN oxycodone. Pumping independently on an appropriate schedule and bonding with infant in NICU. No s/sx of infection at this time. POST-BIRTH warning signs, [id number], BP cuff log, and discharge instructions provided and reviewed. Home PT set up and first appointment set. All questions/concerns answered. Pt to walk off unit with partner, feels ready to go home.

## 2024-08-08 NOTE — DISCHARGE INSTR - APPOINTMENTS
Follow up in 1 week for a blood pressure check and incision check  Then follow up in 4-6 weeks for a post partum follow up

## 2024-08-09 ENCOUNTER — HOME CARE VISIT (OUTPATIENT)
Dept: HOME HEALTH SERVICES | Facility: HOME HEALTH | Age: 34
End: 2024-08-09
Payer: COMMERCIAL

## 2024-08-09 VITALS
DIASTOLIC BLOOD PRESSURE: 68 MMHG | RESPIRATION RATE: 14 BRPM | TEMPERATURE: 98.2 F | HEART RATE: 100 BPM | SYSTOLIC BLOOD PRESSURE: 100 MMHG

## 2024-08-09 PROCEDURE — G0151 HHCP-SERV OF PT,EA 15 MIN: HCPCS

## 2024-08-09 SDOH — HEALTH STABILITY: PHYSICAL HEALTH
EXERCISE COMMENTS: PERFORMED 5 REPS EACH OF FOLLOWING: LAQ, STANDING HIP ABD, FLEX AND EXTENSION, STANDING HEEL RAISES, MINI SQUATS AND STANDING KNEE FLEXION.

## 2024-08-09 ASSESSMENT — ENCOUNTER SYMPTOMS
PAIN LOCATION - PAIN SEVERITY: 5/10
PAIN LOCATION - PAIN FREQUENCY: CONSTANT
OCCASIONAL FEELINGS OF UNSTEADINESS: 1
HIGHEST PAIN SEVERITY IN PAST 24 HOURS: 7/10
PAIN LOCATION - EXACERBATING FACTORS: STANDING, MOVEMENT
PAIN: 1
DEPRESSION: 0
PAIN SEVERITY GOAL: 0/10
PAIN LOCATION - PAIN QUALITY: ACHE
PERSON REPORTING PAIN: PATIENT
LOWEST PAIN SEVERITY IN PAST 24 HOURS: 4/10
PAIN LOCATION - RELIEVING FACTORS: MEDS, REST
PAIN LOCATION: ABDOMEN
MUSCLE WEAKNESS: 1
LOSS OF SENSATION IN FEET: 0
SUBJECTIVE PAIN PROGRESSION: WAXING AND WANING

## 2024-08-09 ASSESSMENT — ACTIVITIES OF DAILY LIVING (ADL)
AMBULATION ASSISTANCE ON FLAT SURFACES: 1
OASIS_M1830: 03
ENTERING_EXITING_HOME: STAND BY ASSIST

## 2024-08-12 ENCOUNTER — CLINICAL SUPPORT (OUTPATIENT)
Dept: OBSTETRICS AND GYNECOLOGY | Facility: CLINIC | Age: 34
End: 2024-08-12
Payer: COMMERCIAL

## 2024-08-12 VITALS
BODY MASS INDEX: 31.74 KG/M2 | SYSTOLIC BLOOD PRESSURE: 111 MMHG | DIASTOLIC BLOOD PRESSURE: 76 MMHG | HEART RATE: 84 BPM | WEIGHT: 168 LBS

## 2024-08-12 DIAGNOSIS — Z48.89 ENCOUNTER FOR POSTOPERATIVE WOUND CARE: ICD-10-CM

## 2024-08-12 DIAGNOSIS — Z01.30 ENCOUNTER FOR BLOOD PRESSURE EXAMINATION: ICD-10-CM

## 2024-08-12 ASSESSMENT — PAIN SCALES - GENERAL: PAINLEVEL: 1

## 2024-08-12 NOTE — PROGRESS NOTES
Patient here for incision check and BP check, s/p  24 at 31.6 weeks GA, with complication of a 500cc hemoperitoneum with 6x6cm rectus sheath hematoma.   Bikini incision appears healing well  No redness  No drainage  BP today 111/76  symptoms include none  History of sPEC  On labor and delivery was given IV mag  Discharged home on Nifedipine and Labetalol  Has support at home  Denies depression  Patient is  pumping, baby in step down unit, being tube fed  Will schedule follow up appointment with regular provider, Dr Bearden at the [place] office.

## 2024-08-13 ENCOUNTER — HOME CARE VISIT (OUTPATIENT)
Dept: HOME HEALTH SERVICES | Facility: HOME HEALTH | Age: 34
End: 2024-08-13
Payer: COMMERCIAL

## 2024-08-13 VITALS
DIASTOLIC BLOOD PRESSURE: 75 MMHG | SYSTOLIC BLOOD PRESSURE: 115 MMHG | TEMPERATURE: 98.6 F | HEART RATE: 87 BPM | OXYGEN SATURATION: 99 %

## 2024-08-13 PROCEDURE — G0157 HHC PT ASSISTANT EA 15: HCPCS | Mod: CQ

## 2024-08-13 SDOH — HEALTH STABILITY: PHYSICAL HEALTH: EXERCISE TYPE: BLE STANDING

## 2024-08-13 SDOH — HEALTH STABILITY: PHYSICAL HEALTH
EXERCISE COMMENTS: PT COMPLETED BLE STANDING: 2 X10 REPS (PT REQUESTED)  MARCHES  HIP ABD  HIP EXT  HEEL RAISES   HAMSTRING CURLS  MINI SQUATS

## 2024-08-13 ASSESSMENT — ENCOUNTER SYMPTOMS
SUBJECTIVE PAIN PROGRESSION: GRADUALLY IMPROVING
MUSCLE WEAKNESS: 1
PAIN LOCATION - PAIN FREQUENCY: FREQUENT
PAIN LOCATION: ABDOMEN
HIGHEST PAIN SEVERITY IN PAST 24 HOURS: 5/10
PAIN LOCATION - RELIEVING FACTORS: REST/MEDS
PAIN LOCATION - PAIN SEVERITY: 4/10
PERSON REPORTING PAIN: PATIENT
PAIN LOCATION - EXACERBATING FACTORS: STANDING
PAIN LOCATION - PAIN QUALITY: ACHE
LOWEST PAIN SEVERITY IN PAST 24 HOURS: 2/10
PAIN SEVERITY GOAL: 0/10
PAIN: 1

## 2024-08-13 ASSESSMENT — ACTIVITIES OF DAILY LIVING (ADL)
AMBULATION ASSISTANCE: STAND BY ASSIST
AMBULATION_DISTANCE/DURATION_TOLERATED: 50 FT
AMBULATION ASSISTANCE ON FLAT SURFACES: 1
CURRENT_FUNCTION: STAND BY ASSIST

## 2024-08-15 ENCOUNTER — HOME CARE VISIT (OUTPATIENT)
Dept: HOME HEALTH SERVICES | Facility: HOME HEALTH | Age: 34
End: 2024-08-15
Payer: COMMERCIAL

## 2024-08-15 PROCEDURE — G0157 HHC PT ASSISTANT EA 15: HCPCS | Mod: CQ

## 2024-08-15 SDOH — HEALTH STABILITY: PHYSICAL HEALTH
EXERCISE COMMENTS: PT COMPLETED BLE STANDING: 2X 15 REPS  MARCHES  HIP ABD  HIP EXT  HEEL RAISES   HAMSTRING CURLS  MINI SQUATS

## 2024-08-15 SDOH — HEALTH STABILITY: PHYSICAL HEALTH: EXERCISE TYPE: BLE STANDING

## 2024-08-15 ASSESSMENT — ACTIVITIES OF DAILY LIVING (ADL)
CURRENT_FUNCTION: STAND BY ASSIST
AMBULATION ASSISTANCE: STAND BY ASSIST
AMBULATION_DISTANCE/DURATION_TOLERATED: 50 FT
AMBULATION ASSISTANCE ON FLAT SURFACES: 1

## 2024-08-15 ASSESSMENT — ENCOUNTER SYMPTOMS
PAIN LOCATION - PAIN SEVERITY: 2/10
MUSCLE WEAKNESS: 1
SUBJECTIVE PAIN PROGRESSION: GRADUALLY IMPROVING
PAIN: 1
PAIN LOCATION: ABDOMEN
LOWEST PAIN SEVERITY IN PAST 24 HOURS: 1/10
HIGHEST PAIN SEVERITY IN PAST 24 HOURS: 4/10
PERSON REPORTING PAIN: PATIENT
PAIN SEVERITY GOAL: 0/10

## 2024-08-19 ENCOUNTER — HOSPITAL ENCOUNTER (OUTPATIENT)
Facility: HOSPITAL | Age: 34
Discharge: HOME | End: 2024-08-20
Attending: OBSTETRICS & GYNECOLOGY | Admitting: OBSTETRICS & GYNECOLOGY
Payer: COMMERCIAL

## 2024-08-19 ENCOUNTER — CLINICAL SUPPORT (OUTPATIENT)
Dept: EMERGENCY MEDICINE | Facility: HOSPITAL | Age: 34
End: 2024-08-19
Payer: COMMERCIAL

## 2024-08-19 DIAGNOSIS — O99.013 ANEMIA IN PREGNANCY, THIRD TRIMESTER (HHS-HCC): ICD-10-CM

## 2024-08-19 DIAGNOSIS — M54.50 LOW BACK PAIN, UNSPECIFIED BACK PAIN LATERALITY, UNSPECIFIED CHRONICITY, UNSPECIFIED WHETHER SCIATICA PRESENT: Primary | ICD-10-CM

## 2024-08-19 PROCEDURE — 4500999001 HC ED NO CHARGE

## 2024-08-19 PROCEDURE — 2500000005 HC RX 250 GENERAL PHARMACY W/O HCPCS

## 2024-08-19 PROCEDURE — 99281 EMR DPT VST MAYX REQ PHY/QHP: CPT

## 2024-08-19 PROCEDURE — 93005 ELECTROCARDIOGRAM TRACING: CPT

## 2024-08-19 PROCEDURE — 2500000001 HC RX 250 WO HCPCS SELF ADMINISTERED DRUGS (ALT 637 FOR MEDICARE OP): Performed by: STUDENT IN AN ORGANIZED HEALTH CARE EDUCATION/TRAINING PROGRAM

## 2024-08-19 SDOH — SOCIAL STABILITY: SOCIAL INSECURITY: HAVE YOU HAD ANY THOUGHTS OF HARMING ANYONE ELSE?: NO

## 2024-08-19 SDOH — SOCIAL STABILITY: SOCIAL INSECURITY: DO YOU FEEL ANYONE HAS EXPLOITED OR TAKEN ADVANTAGE OF YOU FINANCIALLY OR OF YOUR PERSONAL PROPERTY?: NO

## 2024-08-19 SDOH — HEALTH STABILITY: MENTAL HEALTH: WISH TO BE DEAD (PAST 1 MONTH): NO

## 2024-08-19 SDOH — SOCIAL STABILITY: SOCIAL INSECURITY: HAVE YOU HAD THOUGHTS OF HARMING ANYONE ELSE?: NO

## 2024-08-19 SDOH — HEALTH STABILITY: MENTAL HEALTH: WERE YOU ABLE TO COMPLETE ALL THE BEHAVIORAL HEALTH SCREENINGS?: YES

## 2024-08-19 SDOH — SOCIAL STABILITY: SOCIAL INSECURITY: ARE YOU OR HAVE YOU BEEN THREATENED OR ABUSED PHYSICALLY, EMOTIONALLY, OR SEXUALLY BY ANYONE?: NO

## 2024-08-19 SDOH — SOCIAL STABILITY: SOCIAL INSECURITY: HAS ANYONE EVER THREATENED TO HURT YOUR FAMILY OR YOUR PETS?: NO

## 2024-08-19 SDOH — HEALTH STABILITY: MENTAL HEALTH: SUICIDAL BEHAVIOR (LIFETIME): NO

## 2024-08-19 SDOH — SOCIAL STABILITY: SOCIAL INSECURITY: VERBAL ABUSE: DENIES

## 2024-08-19 SDOH — SOCIAL STABILITY: SOCIAL INSECURITY: ABUSE SCREEN: ADULT

## 2024-08-19 SDOH — SOCIAL STABILITY: SOCIAL INSECURITY: DOES ANYONE TRY TO KEEP YOU FROM HAVING/CONTACTING OTHER FRIENDS OR DOING THINGS OUTSIDE YOUR HOME?: NO

## 2024-08-19 SDOH — SOCIAL STABILITY: SOCIAL INSECURITY: PHYSICAL ABUSE: DENIES

## 2024-08-19 SDOH — SOCIAL STABILITY: SOCIAL INSECURITY: ARE THERE ANY APPARENT SIGNS OF INJURIES/BEHAVIORS THAT COULD BE RELATED TO ABUSE/NEGLECT?: NO

## 2024-08-19 SDOH — ECONOMIC STABILITY: HOUSING INSECURITY: DO YOU FEEL UNSAFE GOING BACK TO THE PLACE WHERE YOU ARE LIVING?: NO

## 2024-08-19 SDOH — HEALTH STABILITY: MENTAL HEALTH: NON-SPECIFIC ACTIVE SUICIDAL THOUGHTS (PAST 1 MONTH): NO

## 2024-08-19 SDOH — HEALTH STABILITY: MENTAL HEALTH: HAVE YOU USED ANY PRESCRIPTION DRUGS OTHER THAN PRESCRIBED IN THE PAST 12 MONTHS?: NO

## 2024-08-19 SDOH — HEALTH STABILITY: MENTAL HEALTH: HAVE YOU USED ANY SUBSTANCES (CANABIS, COCAINE, HEROIN, HALLUCINOGENS, INHALANTS, ETC.) IN THE PAST 12 MONTHS?: NO

## 2024-08-19 ASSESSMENT — PAIN SCALES - GENERAL
PAINLEVEL_OUTOF10: 8

## 2024-08-19 ASSESSMENT — ACTIVITIES OF DAILY LIVING (ADL): LACK_OF_TRANSPORTATION: NO

## 2024-08-19 ASSESSMENT — PAIN DESCRIPTION - DESCRIPTORS: DESCRIPTORS: STABBING

## 2024-08-19 ASSESSMENT — PATIENT HEALTH QUESTIONNAIRE - PHQ9
1. LITTLE INTEREST OR PLEASURE IN DOING THINGS: NOT AT ALL
SUM OF ALL RESPONSES TO PHQ9 QUESTIONS 1 & 2: 0
2. FEELING DOWN, DEPRESSED OR HOPELESS: NOT AT ALL

## 2024-08-19 ASSESSMENT — PAIN - FUNCTIONAL ASSESSMENT: PAIN_FUNCTIONAL_ASSESSMENT: 0-10

## 2024-08-19 ASSESSMENT — LIFESTYLE VARIABLES
AUDIT-C TOTAL SCORE: 0
HOW OFTEN DO YOU HAVE A DRINK CONTAINING ALCOHOL: NEVER
HOW MANY STANDARD DRINKS CONTAINING ALCOHOL DO YOU HAVE ON A TYPICAL DAY: PATIENT DOES NOT DRINK
AUDIT-C TOTAL SCORE: 0
HOW OFTEN DO YOU HAVE 6 OR MORE DRINKS ON ONE OCCASION: NEVER
SKIP TO QUESTIONS 9-10: 1

## 2024-08-19 ASSESSMENT — COLUMBIA-SUICIDE SEVERITY RATING SCALE - C-SSRS
6. HAVE YOU EVER DONE ANYTHING, STARTED TO DO ANYTHING, OR PREPARED TO DO ANYTHING TO END YOUR LIFE?: NO
2. HAVE YOU ACTUALLY HAD ANY THOUGHTS OF KILLING YOURSELF?: NO
1. IN THE PAST MONTH, HAVE YOU WISHED YOU WERE DEAD OR WISHED YOU COULD GO TO SLEEP AND NOT WAKE UP?: NO

## 2024-08-20 ENCOUNTER — HOSPITAL ENCOUNTER (OUTPATIENT)
Facility: HOSPITAL | Age: 34
End: 2024-08-20
Attending: OBSTETRICS & GYNECOLOGY | Admitting: OBSTETRICS & GYNECOLOGY
Payer: COMMERCIAL

## 2024-08-20 ENCOUNTER — HOME CARE VISIT (OUTPATIENT)
Dept: HOME HEALTH SERVICES | Facility: HOME HEALTH | Age: 34
End: 2024-08-20
Payer: COMMERCIAL

## 2024-08-20 VITALS
HEIGHT: 61 IN | DIASTOLIC BLOOD PRESSURE: 61 MMHG | OXYGEN SATURATION: 98 % | WEIGHT: 161.82 LBS | SYSTOLIC BLOOD PRESSURE: 106 MMHG | BODY MASS INDEX: 30.55 KG/M2 | RESPIRATION RATE: 16 BRPM | TEMPERATURE: 97 F | HEART RATE: 78 BPM

## 2024-08-20 PROCEDURE — 2500000005 HC RX 250 GENERAL PHARMACY W/O HCPCS

## 2024-08-20 PROCEDURE — 99213 OFFICE O/P EST LOW 20 MIN: CPT

## 2024-08-20 PROCEDURE — 2500000001 HC RX 250 WO HCPCS SELF ADMINISTERED DRUGS (ALT 637 FOR MEDICARE OP): Performed by: STUDENT IN AN ORGANIZED HEALTH CARE EDUCATION/TRAINING PROGRAM

## 2024-08-20 PROCEDURE — 99221 1ST HOSP IP/OBS SF/LOW 40: CPT | Performed by: STUDENT IN AN ORGANIZED HEALTH CARE EDUCATION/TRAINING PROGRAM

## 2024-08-20 RX ORDER — CYCLOBENZAPRINE HCL 10 MG
10 TABLET ORAL 3 TIMES DAILY PRN
Qty: 30 TABLET | Refills: 0 | Status: SHIPPED | OUTPATIENT
Start: 2024-08-20 | End: 2024-09-19

## 2024-08-20 RX ORDER — LIDOCAINE 560 MG/1
1 PATCH PERCUTANEOUS; TOPICAL; TRANSDERMAL DAILY
Qty: 30 PATCH | Refills: 0 | Status: SHIPPED | OUTPATIENT
Start: 2024-08-21 | End: 2024-09-20

## 2024-08-20 RX ORDER — ACETAMINOPHEN 325 MG/1
650 TABLET ORAL EVERY 6 HOURS PRN
Qty: 30 TABLET | Refills: 0 | Status: SHIPPED | OUTPATIENT
Start: 2024-08-20 | End: 2024-09-19

## 2024-08-20 RX ORDER — IBUPROFEN 600 MG/1
600 TABLET ORAL EVERY 6 HOURS PRN
Status: DISCONTINUED | OUTPATIENT
Start: 2024-08-20 | End: 2024-08-20 | Stop reason: HOSPADM

## 2024-08-20 RX ORDER — ACETAMINOPHEN 325 MG/1
975 TABLET ORAL EVERY 6 HOURS PRN
Status: DISCONTINUED | OUTPATIENT
Start: 2024-08-20 | End: 2024-08-20 | Stop reason: HOSPADM

## 2024-08-20 RX ORDER — CYCLOBENZAPRINE HCL 10 MG
10 TABLET ORAL 3 TIMES DAILY PRN
Status: DISCONTINUED | OUTPATIENT
Start: 2024-08-20 | End: 2024-08-20 | Stop reason: HOSPADM

## 2024-08-20 RX ORDER — FERROUS GLUCONATE 324(38)MG
38 TABLET ORAL
Qty: 30 TABLET | Refills: 1 | Status: SHIPPED | OUTPATIENT
Start: 2024-08-20 | End: 2024-10-19

## 2024-08-20 RX ORDER — LIDOCAINE 560 MG/1
1 PATCH PERCUTANEOUS; TOPICAL; TRANSDERMAL DAILY
Status: DISCONTINUED | OUTPATIENT
Start: 2024-08-20 | End: 2024-08-20 | Stop reason: HOSPADM

## 2024-08-20 RX ORDER — LIDOCAINE 560 MG/1
1 PATCH PERCUTANEOUS; TOPICAL; TRANSDERMAL DAILY
Status: DISCONTINUED | OUTPATIENT
Start: 2024-08-20 | End: 2024-08-20

## 2024-08-20 RX ORDER — POLYETHYLENE GLYCOL 3350 17 G/17G
17 POWDER, FOR SOLUTION ORAL DAILY
Status: DISCONTINUED | OUTPATIENT
Start: 2024-08-20 | End: 2024-08-20 | Stop reason: HOSPADM

## 2024-08-20 ASSESSMENT — PAIN SCALES - GENERAL
PAINLEVEL_OUTOF10: 7
PAINLEVEL_OUTOF10: 4
PAINLEVEL_OUTOF10: 5 - MODERATE PAIN
PAINLEVEL_OUTOF10: 8
PAINLEVEL_OUTOF10: 3

## 2024-08-20 NOTE — ED TRIAGE NOTES
Back and chest pain starting yesterday just while she was sitting down. Endorses some SOB on exertion. Denies swelling in legs. discharge home on nifedipine and labetalol

## 2024-08-20 NOTE — H&P
OB Triage H&P    Assessment/Plan    Jacqueline Ambriz is a 34 y.o.  s/p CS (@ 31w6d) on  c/b rectus sheath hematoma and ex lap with a hx of PEC w/ SF who presents to triage for upper back pain and abdominal pain.    Sharp upper back pain, worse with inspiration:  Likely MSK in nature given reproducible on examination  Other differentials could include PE, however, no leg swelling or calf tenderness on examination, no EKG changes suggestive of PE, vital signs stable  Will rx tylenol, ibuprofen, flexeril, and lidocaine patches to see if pain improves with treatment  EKG: normal sinus rhythm     Abdominal pain:  Abdominal pain likely in the setting of constipation given similar timing of onset  Less concerned for obstructive process given no nausea or vomiting  Will order Miralax       Elizabeth Feliz, M4  Patient seen and evaluated with medical student. Agree with assessment above, edits made within text.  Monica Paulino, PGY4 OBGYN  Seen and discussed with Dr. Luciana Parker    Subjective     Patient presents today with middle back pain, abdominal pain, and loss of appetite since yesterday. She states that she was not doing anything when the pain came on. She states that the pain is sharp and exacerbated by deep breathing. She states that the pain comes and goes. She also endorses some SOB with exertion.     She also endorses that she has had constipation and has had L sided abdominal pain that has been constant with her constipation. She states that her last BM was last week. She denies any flatulence that she can recall.     She endorses taking her labetalol and nifedipine as prescribed. She denies any HA, vision changes, dizziness, or RUQ pain.     29w2d Problems (from 24 to present)       Problem Noted Resolved    Indication for care in labor and delivery, antepartum ([facility]) 2024 by Cat Chan, RN No    Severe pre-eclampsia in third trimester ([facility]) 2024 by Elizabeth COFFEY  SCOOTER Stovall-CNP No    Third trimester bleeding, antepartum ([facility]) 2024 by Chinyere Rivero MD No    Multigravida of advanced maternal age in second trimester ([facility]) 2024 by Marjan Bearden MD No    Overview Signed 2024 10:36 AM by Marjan Bearden MD     Rr cf DNA         Supervision of high risk pregnancy, antepartum ([facility]) 2024 by Marjan Bearden MD No    Overview Addendum 2024  2:40 PM by Marjan Bearden MD     [x] Dating: LMP consistent with 7 week ultrasound  [x] Initial BMI: 32  [x] Prenatal Labs: B+ blood type, rubella equivocal, Hgb 12.2 -> 11.2 (second trimester)  [x] Pap: 2023: NILM/neg HPV  [x] Aneuploidy Screening: rr cfDNA, XX   [x] Baby ASA: Not indicated  [x] Anatomy US: normal  [x] 1hr GCT at 24-28wks: normal, 118  [x] Tdap (27-36wks): done  [x] Flu Shot: NA  [] COVID vaccine:   [x] Rhogam (if Rh neg): Not indicated  [] Third trimester growth:   [] GBS at 36 wks:  [x] Breastfeeding: Desires to bf  [x] PPBC: Discussed options, desires nexplanon immediately postpartum   [] 39 weeks discussion of IOL vs. Expectant management:  [x] Mode of delivery: Desires another TOLAC           Pregnancy in first trimester with history of ectopic pregnancy ([facility]) 2/3/2023 by Ita Gorman MD 3/15/2024 by Ita Gorman MD            OB History    Para Term  AB Living   7 4 2 2 3 4   SAB IAB Ectopic Multiple Live Births   1 0 2 0 4      # Outcome Date GA Lbr Tree/2nd Weight Sex Type Anes PTL Lv   7  24 31w6d  1.67 kg F CS-LTranv EPI  CANDE      Complications: Failure to Progress in First Stage, Intraamniotic Infection      Name: Laura Ambriz      Apgar1: 4  Apgar5: 4   6 Ectopic 2023           5 Ectopic 2021           4 SAB            3 Term 14 40w0d  3.175 kg M Vag-Spont  N CANDE      Name: Gerson Engel   2 Term 08/10/10 40w0d  2.722 kg M Vag-Spont  N CANDE      Name: Davon Engel   1   08 35w0d  2.722 kg M CS-Unspec  Y CANDE      Complications: Breathing problem in infant      Name: Abdirashid Engel       Past Surgical History:   Procedure Laterality Date     SECTION, LOW TRANSVERSE      SALPINGECTOMY Left 2023       Social History     Tobacco Use    Smoking status: Never    Smokeless tobacco: Never   Substance Use Topics    Alcohol use: Not Currently       No Known Allergies    Medications Prior to Admission   Medication Sig Dispense Refill Last Dose    acetaminophen (Tylenol) 500 mg tablet Take 1,400 tablets (700,000 mg) by mouth every 6 hours if needed for mild pain (1 - 3) or headaches.   2024 at 1400    aspirin 81 mg chewable tablet Chew 1 tablet (81 mg) once daily. 30 tablet 11 2024 at 0800    [] ferrous gluconate 324 (38 Fe) mg tablet Take 1 tablet (38 mg of iron) by mouth once daily with breakfast. 30 tablet 0 2024    ibuprofen 600 mg tablet Take 1 tablet (600 mg) by mouth every 6 hours. 120 tablet 0 2024 at 1400    labetalol (Normodyne) 100 mg tablet Take 4 tablets (400 mg) by mouth 2 times a day. 240 tablet 0 2024 at 0800    NIFEdipine ER (Adalat CC) 60 mg 24 hr tablet Take 1 tablet (60 mg) by mouth every 12 hours. Do not crush, chew, or split. 60 tablet 0 2024 at 1600    blood pressure test kit-large kit 1 applicator once daily. 1 each 0      Objective     Last Vitals  Temp Pulse Resp BP MAP O2 Sat   36.1 °C (97 °F) 76 16 117/70 89 98 %       Physical Exam  General: NAD, mood appropriate  Cardio: normal rate and rhythm, no m/r/g  Pulmonary: lung sounds diminished on R side versus L, no wheezes, rhonchi, or crackles  Thoracic spine: point tenderness between scapula around T6 level along the spinous process  Abdomen: incisions healing well, no tenderness to light or deep palpation in all four quadrants, abdominal pain reproducible with Valsalva maneuver  Extremities: Symmetric without significant edema, no calf tenderness or edema  "bilaterally       No results found for: \"ABO\", \"LABRH\", \"ABSCRN\"  No results found for: \"WBC\", \"HGB\", \"HCT\", \"PLT\"  No results found for: \"GLUCOSE\", \"NA\", \"K\", \"CL\", \"CO2\", \"ANIONGAP\", \"BUN\", \"CREATININE\", \"EGFR\", \"CALCIUM\", \"ALBUMIN\", \"PROT\", \"ALKPHOS\", \"ALT\", \"AST\", \"BILITOT\"           "

## 2024-08-21 ENCOUNTER — HOME CARE VISIT (OUTPATIENT)
Dept: HOME HEALTH SERVICES | Facility: HOME HEALTH | Age: 34
End: 2024-08-21
Payer: COMMERCIAL

## 2024-08-21 VITALS
OXYGEN SATURATION: 99 % | DIASTOLIC BLOOD PRESSURE: 60 MMHG | TEMPERATURE: 97.5 F | SYSTOLIC BLOOD PRESSURE: 110 MMHG | HEART RATE: 66 BPM

## 2024-08-21 LAB
ATRIAL RATE: 82 BPM
P AXIS: 62 DEGREES
P OFFSET: 193 MS
P ONSET: 144 MS
PR INTERVAL: 150 MS
Q ONSET: 219 MS
QRS COUNT: 14 BEATS
QRS DURATION: 98 MS
QT INTERVAL: 358 MS
QTC CALCULATION(BAZETT): 418 MS
QTC FREDERICIA: 397 MS
R AXIS: 87 DEGREES
T AXIS: 49 DEGREES
T OFFSET: 398 MS
VENTRICULAR RATE: 82 BPM

## 2024-08-21 PROCEDURE — G0157 HHC PT ASSISTANT EA 15: HCPCS | Mod: CQ

## 2024-08-21 SDOH — HEALTH STABILITY: PHYSICAL HEALTH: EXERCISE COMMENTS: PT COMPLETED BLE STANDING:  MARCHES  HIP ABD  HIP EXT  HEEL RAISES   HAMSTRING CURLS  MINI SQUATS

## 2024-08-21 SDOH — HEALTH STABILITY: PHYSICAL HEALTH: EXERCISE TYPE: BLE STANDING

## 2024-08-21 ASSESSMENT — ENCOUNTER SYMPTOMS
PAIN: 1
PAIN SEVERITY GOAL: 0/10
SUBJECTIVE PAIN PROGRESSION: GRADUALLY IMPROVING
PERSON REPORTING PAIN: PATIENT
PAIN LOCATION: ABDOMEN
HIGHEST PAIN SEVERITY IN PAST 24 HOURS: 1/10
PAIN LOCATION - PAIN SEVERITY: 1/10
LOWEST PAIN SEVERITY IN PAST 24 HOURS: 0/10

## 2024-08-22 ENCOUNTER — APPOINTMENT (OUTPATIENT)
Dept: RADIOLOGY | Facility: HOSPITAL | Age: 34
End: 2024-08-22
Payer: COMMERCIAL

## 2024-08-27 ENCOUNTER — HOME CARE VISIT (OUTPATIENT)
Dept: HOME HEALTH SERVICES | Facility: HOME HEALTH | Age: 34
End: 2024-08-27
Payer: COMMERCIAL

## 2024-08-29 ENCOUNTER — HOME CARE VISIT (OUTPATIENT)
Dept: HOME HEALTH SERVICES | Facility: HOME HEALTH | Age: 34
End: 2024-08-29
Payer: COMMERCIAL

## 2024-08-29 PROCEDURE — G0157 HHC PT ASSISTANT EA 15: HCPCS | Mod: CQ

## 2024-08-29 SDOH — HEALTH STABILITY: PHYSICAL HEALTH: EXERCISE TYPE: BLE STANDING

## 2024-08-29 SDOH — HEALTH STABILITY: PHYSICAL HEALTH: EXERCISE COMMENTS: PT COMPLETED BLE STANDING:  MARCHES  HIP ABD  HIP EXT  HEEL RAISES   HAMSTRING CURLS  MINI SQUATS

## 2024-08-29 ASSESSMENT — ACTIVITIES OF DAILY LIVING (ADL)
AMBULATION ASSISTANCE: STAND BY ASSIST
AMBULATION ASSISTANCE ON FLAT SURFACES: 1
CURRENT_FUNCTION: STAND BY ASSIST
AMBULATION_DISTANCE/DURATION_TOLERATED: 50 FT

## 2024-08-29 ASSESSMENT — ENCOUNTER SYMPTOMS
DENIES PAIN: 1
MUSCLE WEAKNESS: 1
PERSON REPORTING PAIN: PATIENT

## 2024-08-30 ENCOUNTER — APPOINTMENT (OUTPATIENT)
Dept: OBSTETRICS AND GYNECOLOGY | Facility: CLINIC | Age: 34
End: 2024-08-30
Payer: COMMERCIAL

## 2024-09-05 ENCOUNTER — HOME CARE VISIT (OUTPATIENT)
Dept: HOME HEALTH SERVICES | Facility: HOME HEALTH | Age: 34
End: 2024-09-05
Payer: COMMERCIAL

## 2024-09-05 PROCEDURE — G0151 HHCP-SERV OF PT,EA 15 MIN: HCPCS

## 2024-09-05 ASSESSMENT — ACTIVITIES OF DAILY LIVING (ADL)
HOME_HEALTH_OASIS: 00
AMBULATION ASSISTANCE ON FLAT SURFACES: 1
AMBULATION_DISTANCE/DURATION_TOLERATED: 200
OASIS_M1830: 00

## 2024-09-05 ASSESSMENT — ENCOUNTER SYMPTOMS
DENIES PAIN: 1
PERSON REPORTING PAIN: PATIENT

## 2024-09-23 ENCOUNTER — APPOINTMENT (OUTPATIENT)
Dept: OBSTETRICS AND GYNECOLOGY | Facility: CLINIC | Age: 34
End: 2024-09-23
Payer: COMMERCIAL

## 2024-09-23 VITALS
WEIGHT: 166 LBS | SYSTOLIC BLOOD PRESSURE: 110 MMHG | BODY MASS INDEX: 32.59 KG/M2 | DIASTOLIC BLOOD PRESSURE: 58 MMHG | HEIGHT: 60 IN

## 2024-09-23 DIAGNOSIS — S30.1XXD HEMATOMA OF RECTUS SHEATH, SUBSEQUENT ENCOUNTER: ICD-10-CM

## 2024-09-23 DIAGNOSIS — O14.13 SEVERE PRE-ECLAMPSIA IN THIRD TRIMESTER (HHS-HCC): ICD-10-CM

## 2024-09-23 DIAGNOSIS — R82.90 ABNORMAL URINE ODOR: ICD-10-CM

## 2024-09-23 PROCEDURE — 87186 SC STD MICRODIL/AGAR DIL: CPT

## 2024-09-23 PROCEDURE — 87086 URINE CULTURE/COLONY COUNT: CPT

## 2024-09-23 ASSESSMENT — EDINBURGH POSTNATAL DEPRESSION SCALE (EPDS)
I HAVE BEEN SO UNHAPPY THAT I HAVE BEEN CRYING: NO, NEVER
THE THOUGHT OF HARMING MYSELF HAS OCCURRED TO ME: NEVER
I HAVE FELT SAD OR MISERABLE: NO, NOT AT ALL
I HAVE BEEN ANXIOUS OR WORRIED FOR NO GOOD REASON: NO, NOT AT ALL
THINGS HAVE BEEN GETTING ON TOP OF ME: NO, I HAVE BEEN COPING AS WELL AS EVER
I HAVE BEEN SO UNHAPPY THAT I HAVE HAD DIFFICULTY SLEEPING: NOT AT ALL
I HAVE LOOKED FORWARD WITH ENJOYMENT TO THINGS: AS MUCH AS I EVER DID
I HAVE FELT SCARED OR PANICKY FOR NO GOOD REASON: NO, NOT AT ALL
I HAVE BLAMED MYSELF UNNECESSARILY WHEN THINGS WENT WRONG: NO, NEVER

## 2024-09-23 ASSESSMENT — PAIN SCALES - GENERAL: PAINLEVEL: 0-NO PAIN

## 2024-09-23 NOTE — PROGRESS NOTES
Assessment   IMPRESSIONS:    1. Encounter for postpartum visit ([facility])  - recovering well without concerns today  - mood doing well, discussed symptoms of PTSD to watch for given her difficult delivery    2. Abnormal urine odor  - UA/culture sent, treat based on results    3. Severe pre-eclampsia in third trimester ([facility])  - BP normal today, can discontinue nifedipine    4. Hematoma of rectus sheath, subsequent encounter  - pain resolved, abdominal exam normal    Follow up for preventative visit in 1 year or sooner CHIDI Bearden MD    Subjective   34 y.o.  presenting for postpartum follow-up     Delivery Date: 24  GA at Delivery: 31w2d  Type of Delivery: LTCS    Pregnancy/delivery notable for: take back to OR for exploratory lap for rectus sheath hematoma, went to ICU for recovery    Concerns:   - c/o strong smelling urine, especially in the morning, no dysuria    Pain: occasional, uses tylenol or ibuprofen  Lacerations: None  Lochia: none  Menses: first menses was heavy  Sexual Intimacy: not yet  Contraceptive Method: None, declines for now  Feeding Method: formula  Lactation Consult Needed?: No  Birth Trauma: Denies, reviewed signs and symptoms  Bonding with Baby: well   Mood:   denies concerns    Objective   Vitals:    24 1405   BP: 110/58       PHYSICAL EXAM  Objective   /58   Ht 1.524 m (5')   Wt 75.3 kg (166 lb)   LMP  (LMP Unknown)   Breastfeeding No   BMI 32.42 kg/m²      General:   Alert and oriented, in no acute distress           Abdomen: Soft, non-tender, without masses or organomegaly, incision well healed                           Psych Normal affect. Normal mood.

## 2024-09-25 DIAGNOSIS — N30.00 ACUTE CYSTITIS WITHOUT HEMATURIA: ICD-10-CM

## 2024-09-25 DIAGNOSIS — O14.13 SEVERE PRE-ECLAMPSIA IN THIRD TRIMESTER (HHS-HCC): Primary | ICD-10-CM

## 2024-09-26 LAB — BACTERIA UR CULT: ABNORMAL

## 2024-09-26 RX ORDER — NITROFURANTOIN 25; 75 MG/1; MG/1
100 CAPSULE ORAL 2 TIMES DAILY
Qty: 10 CAPSULE | Refills: 0 | Status: SHIPPED | OUTPATIENT
Start: 2024-09-26 | End: 2024-10-01

## 2024-09-27 ENCOUNTER — TELEPHONE (OUTPATIENT)
Dept: OBSTETRICS AND GYNECOLOGY | Facility: CLINIC | Age: 34
End: 2024-09-27
Payer: COMMERCIAL

## 2024-09-27 NOTE — TELEPHONE ENCOUNTER
Called patient. Patient does not currently have back pain. Patient picked up her script for treatment for UTI. Patient does not have any questions or concerns at this time.

## 2024-12-14 NOTE — ASSESSMENT & PLAN NOTE
- IUP confirmed at 7 wks  - PNL today including A1c  - bASA to begin at 12wga  - aneuploidy screening: NT next week and cf DNA  Pre test genetic counseling discussed and included:   -- Interpretation of family and medical histories to assess the probability of disease occurrence or recurrence; and  -- Education about inheritance, genetic testing, disease management, prevention and resources; and   -- Counseling to promote informed choices and adaptation to the risk or presented of a genetic condition; and  -- Counseling for psychological aspects of genetic testing.   Echocardiogram with evidence of aortic stenosis that is mild . The patient's most recent echocardiogram result is listed below. We will manage the valvular abnormality by HCTZ, Valsartan    Echo    Result Date: 3/21/2024    Left Ventricle: There is normal systolic function with a visually   estimated ejection fraction of 55 - 60%. Grade I diastolic dysfunction.    Right Ventricle: Normal right ventricular cavity size. Wall thickness   is normal. Right ventricle wall motion  is normal. Systolic function is   normal.    Aortic Valve: The aortic valve is a trileaflet valve. Mildly restricted   motion. There is mild stenosis. Aortic valve area by VTI is 2.01 cm².   Aortic valve peak velocity is 1.91 m/s. Mean gradient is 8 mmHg. The   dimensionless index is 0.46.    Mitral Valve: There is mild regurgitation with a centrally directed   jet.    Tricuspid Valve: There is mild regurgitation.    Pulmonary Artery: No pulmonary hypertension. The estimated pulmonary   artery systolic pressure is 18 mmHg.

## 2025-03-10 ENCOUNTER — APPOINTMENT (OUTPATIENT)
Dept: CARDIOLOGY | Facility: HOSPITAL | Age: 35
End: 2025-03-10
Payer: COMMERCIAL

## 2025-03-10 ENCOUNTER — APPOINTMENT (OUTPATIENT)
Dept: RADIOLOGY | Facility: HOSPITAL | Age: 35
End: 2025-03-10
Payer: COMMERCIAL

## 2025-03-10 VITALS
BODY MASS INDEX: 31.15 KG/M2 | RESPIRATION RATE: 16 BRPM | HEIGHT: 61 IN | WEIGHT: 165 LBS | TEMPERATURE: 98.1 F | OXYGEN SATURATION: 100 % | DIASTOLIC BLOOD PRESSURE: 78 MMHG | HEART RATE: 66 BPM | SYSTOLIC BLOOD PRESSURE: 127 MMHG

## 2025-03-10 LAB
ALBUMIN SERPL BCP-MCNC: 4 G/DL (ref 3.4–5)
ALP SERPL-CCNC: 82 U/L (ref 33–110)
ALT SERPL W P-5'-P-CCNC: 27 U/L (ref 7–45)
ANION GAP SERPL CALC-SCNC: 10 MMOL/L (ref 10–20)
AST SERPL W P-5'-P-CCNC: 34 U/L (ref 9–39)
B-HCG SERPL-ACNC: <2 MIU/ML
BILIRUB SERPL-MCNC: 0.4 MG/DL (ref 0–1.2)
BUN SERPL-MCNC: 8 MG/DL (ref 6–23)
CALCIUM SERPL-MCNC: 8.8 MG/DL (ref 8.6–10.3)
CARDIAC TROPONIN I PNL SERPL HS: 3 NG/L (ref 0–13)
CHLORIDE SERPL-SCNC: 107 MMOL/L (ref 98–107)
CO2 SERPL-SCNC: 23 MMOL/L (ref 21–32)
CREAT SERPL-MCNC: 0.81 MG/DL (ref 0.5–1.05)
EGFRCR SERPLBLD CKD-EPI 2021: >90 ML/MIN/1.73M*2
GLUCOSE SERPL-MCNC: 93 MG/DL (ref 74–99)
MAGNESIUM SERPL-MCNC: 1.84 MG/DL (ref 1.6–2.4)
POTASSIUM SERPL-SCNC: 4.6 MMOL/L (ref 3.5–5.3)
PROT SERPL-MCNC: 7.3 G/DL (ref 6.4–8.2)
SODIUM SERPL-SCNC: 135 MMOL/L (ref 136–145)

## 2025-03-10 PROCEDURE — 71046 X-RAY EXAM CHEST 2 VIEWS: CPT | Performed by: RADIOLOGY

## 2025-03-10 PROCEDURE — 71046 X-RAY EXAM CHEST 2 VIEWS: CPT

## 2025-03-10 PROCEDURE — 84702 CHORIONIC GONADOTROPIN TEST: CPT

## 2025-03-10 PROCEDURE — 93005 ELECTROCARDIOGRAM TRACING: CPT

## 2025-03-10 PROCEDURE — 84484 ASSAY OF TROPONIN QUANT: CPT

## 2025-03-10 PROCEDURE — 83735 ASSAY OF MAGNESIUM: CPT

## 2025-03-10 PROCEDURE — 36415 COLL VENOUS BLD VENIPUNCTURE: CPT

## 2025-03-10 PROCEDURE — 99285 EMERGENCY DEPT VISIT HI MDM: CPT | Mod: 25

## 2025-03-10 PROCEDURE — 80053 COMPREHEN METABOLIC PANEL: CPT

## 2025-03-10 ASSESSMENT — PAIN DESCRIPTION - PAIN TYPE
TYPE: ACUTE PAIN
TYPE: ACUTE PAIN

## 2025-03-10 ASSESSMENT — PAIN DESCRIPTION - DESCRIPTORS: DESCRIPTORS: STABBING

## 2025-03-10 ASSESSMENT — PAIN SCALES - GENERAL
PAINLEVEL_OUTOF10: 6
PAINLEVEL_OUTOF10: 6

## 2025-03-10 ASSESSMENT — PAIN DESCRIPTION - LOCATION
LOCATION: ARM
LOCATION: ARM

## 2025-03-10 ASSESSMENT — PAIN DESCRIPTION - FREQUENCY
FREQUENCY: OTHER (COMMENT)
FREQUENCY: OTHER (COMMENT)

## 2025-03-10 ASSESSMENT — COLUMBIA-SUICIDE SEVERITY RATING SCALE - C-SSRS
6. HAVE YOU EVER DONE ANYTHING, STARTED TO DO ANYTHING, OR PREPARED TO DO ANYTHING TO END YOUR LIFE?: NO
1. IN THE PAST MONTH, HAVE YOU WISHED YOU WERE DEAD OR WISHED YOU COULD GO TO SLEEP AND NOT WAKE UP?: NO
2. HAVE YOU ACTUALLY HAD ANY THOUGHTS OF KILLING YOURSELF?: NO

## 2025-03-10 ASSESSMENT — PAIN DESCRIPTION - ONSET
ONSET: ONGOING
ONSET: ONGOING

## 2025-03-10 ASSESSMENT — PAIN DESCRIPTION - PROGRESSION
CLINICAL_PROGRESSION: NOT CHANGED
CLINICAL_PROGRESSION: NOT CHANGED

## 2025-03-10 ASSESSMENT — PAIN - FUNCTIONAL ASSESSMENT
PAIN_FUNCTIONAL_ASSESSMENT: 0-10
PAIN_FUNCTIONAL_ASSESSMENT: 0-10

## 2025-03-10 ASSESSMENT — PAIN DESCRIPTION - ORIENTATION: ORIENTATION: LOWER

## 2025-03-10 NOTE — ED TRIAGE NOTES
Pt presents to the ED for dizziness and L arm pain. Pt states she feels like she is going to faint. Pt denies any CP, SOB, N/V, fever and chills.

## 2025-03-11 ENCOUNTER — HOSPITAL ENCOUNTER (EMERGENCY)
Facility: HOSPITAL | Age: 35
Discharge: LEFT BEFORE DISCHARGE | End: 2025-03-11
Payer: COMMERCIAL

## 2025-03-11 LAB
ATRIAL RATE: 58 BPM
P AXIS: 39 DEGREES
P OFFSET: 196 MS
P ONSET: 150 MS
PR INTERVAL: 134 MS
Q ONSET: 217 MS
QRS COUNT: 9 BEATS
QRS DURATION: 96 MS
QT INTERVAL: 406 MS
QTC CALCULATION(BAZETT): 398 MS
QTC FREDERICIA: 401 MS
R AXIS: 56 DEGREES
T AXIS: 47 DEGREES
T OFFSET: 420 MS
VENTRICULAR RATE: 58 BPM

## 2025-03-11 NOTE — ED TRIAGE NOTES
TRIAGE NOTE   I saw the patient as the Clinician in Triage and performed a brief history and physical exam, established acuity, and ordered appropriate tests to develop basic plan of care. Patient will be seen by an [facility], resident and/or physician who will independently evaluate the patient. Please see subsequent provider notes for further details and disposition.     Brief HPI: In brief, Jacqueline Ambriz is a 34 y.o. female that presents for dizziness.  Patient reports that she was at target earlier when she suddenly felt lightheaded and felt the room was closing in on her.  She has not palpitations at the time.  She reports that since then her symptoms have resolved.  She reports that she did have a little bit of left arm pain.  She started googling her symptoms and noticed that this could be a symptom of a heart attack.  She does have a family history of heart attacks and was concerned.  She denies any chest pain or shortness of breath.  She denies any chance of pregnancy.  Denies rectal bleeding.  Denies nausea vomiting or abdominal pain.  Patient reports that she is currently asymptomatic.    Focused Physical exam:   NIH 0.  No truncal or gait instability.   No facial droop.  Heart regular rate and rhythm.  No murmurs rubs or gallops.  Lungs clear to auscultation bilaterally.  No rhonchi wheezing or rales.  Plan/MDM:   Initiating basic labs, troponin, magnesium, hCG, chest x-ray, EKG.  Patient will be seen in the back of the ED for further evaluation and treatment.  I will not be phone with this patient during her ED visit.  This is a preliminary evaluation during triage.    I evaluated this patient in triage with the RN. Due to the patients complaint labs and or imaging were ordered by myself in an attempt to expedite patient care however I am not participating in care after evaluation. This is a preliminary assessment. Pt does not appear in acute distress at this time. They will have a full evaluation as  soon as possible. They will be cared for by another provider who will possibly order more labs, imaging or interventions. Pt did not have a full ROS or PE completed by myself however below is a summary with reasons for orders.  For the remainder of the patient's workup and ED course, please refer to the main ED provider note. We discussed need for diagnostic testing including laboratory studies and imaging.  We also discussed that patient may be asked to wait in the waiting room while these tests are pending.  They understand that if they choose to leave without having the testing completed or resulted that we cannot rule out acute life threatening illnesses and the risks involved could lead to worsening condition, permanent disability or even death.     Please see subsequent provider note for further details and disposition

## 2025-04-03 ENCOUNTER — APPOINTMENT (OUTPATIENT)
Dept: OBSTETRICS AND GYNECOLOGY | Facility: CLINIC | Age: 35
End: 2025-04-03
Payer: COMMERCIAL

## (undated) DEVICE — SUTURE, VICRYL, 3-0, 27 IN, SH, VIOLET

## (undated) DEVICE — TOWEL PACK, STERILE, 4/PACK, BLUE

## (undated) DEVICE — SUTURE, VICRYL 0, 36 IN, CT-1, VIOLET

## (undated) DEVICE — DRAPE PACK, CESAREAN SECTION, CUSTOM, UHC

## (undated) DEVICE — SUTURE, MONOCRYL, 2-0, 36 IN, CTX, VIOLET

## (undated) DEVICE — SUTURE, VICRYL, 0, 36 IN, CT, UNDYED

## (undated) DEVICE — FLOSEAL, MATRIX, HEMOSTATIC, FULL STERILE PREP, 5ML

## (undated) DEVICE — SUTURE, MONOCRYL, 3-0, 27 IN, KS, UNDYED

## (undated) DEVICE — SUTURE, MONOCRYL PLUS, 4-0, PS-2 UD 27IN

## (undated) DEVICE — SPONGE, HEMOSTAT, SURGICEL FIBRILLAR, ABS, 4 X 4, LF

## (undated) DEVICE — SUTURE, PDS II, 0 36 IN, CT-1, VIOLET

## (undated) DEVICE — SUTURE, MONOCRYL, 4-0, 27 IN, PS-2, UNDYED